# Patient Record
Sex: MALE | Race: WHITE
[De-identification: names, ages, dates, MRNs, and addresses within clinical notes are randomized per-mention and may not be internally consistent; named-entity substitution may affect disease eponyms.]

---

## 2017-02-07 ENCOUNTER — PRIOR ORIGINAL RECORDS (OUTPATIENT)
Dept: OTHER | Age: 77
End: 2017-02-07

## 2017-02-07 ENCOUNTER — LAB ENCOUNTER (OUTPATIENT)
Dept: LAB | Facility: HOSPITAL | Age: 77
End: 2017-02-07
Attending: INTERNAL MEDICINE
Payer: MEDICARE

## 2017-02-07 DIAGNOSIS — E78.00 PURE HYPERCHOLESTEROLEMIA: Primary | ICD-10-CM

## 2017-02-07 LAB
ALT SERPL-CCNC: 41 U/L (ref 17–63)
AST SERPL-CCNC: 33 U/L (ref 15–41)
CHOLEST SERPL-MCNC: 133 MG/DL (ref 110–200)
HDLC SERPL-MCNC: 49 MG/DL
LDLC SERPL CALC-MCNC: 73 MG/DL (ref 0–99)
NONHDLC SERPL-MCNC: 84 MG/DL
TRIGL SERPL-MCNC: 57 MG/DL (ref 1–149)

## 2017-02-07 PROCEDURE — 84450 TRANSFERASE (AST) (SGOT): CPT

## 2017-02-07 PROCEDURE — 80061 LIPID PANEL: CPT

## 2017-02-07 PROCEDURE — 84460 ALANINE AMINO (ALT) (SGPT): CPT

## 2017-02-07 PROCEDURE — 36415 COLL VENOUS BLD VENIPUNCTURE: CPT

## 2017-02-13 ENCOUNTER — PRIOR ORIGINAL RECORDS (OUTPATIENT)
Dept: OTHER | Age: 77
End: 2017-02-13

## 2017-06-13 LAB
ALT (SGPT): 41 U/L
AST (SGOT): 33 U/L
CHOLESTEROL, TOTAL: 133 MG/DL
HDL CHOLESTEROL: 49 MG/DL
LDL CHOLESTEROL: 73 MG/DL
TRIGLYCERIDES: 57 MG/DL

## 2017-06-14 ENCOUNTER — PRIOR ORIGINAL RECORDS (OUTPATIENT)
Dept: OTHER | Age: 77
End: 2017-06-14

## 2017-07-27 ENCOUNTER — PRIOR ORIGINAL RECORDS (OUTPATIENT)
Dept: OTHER | Age: 77
End: 2017-07-27

## 2017-07-31 ENCOUNTER — PRIOR ORIGINAL RECORDS (OUTPATIENT)
Dept: OTHER | Age: 77
End: 2017-07-31

## 2017-10-26 ENCOUNTER — PRIOR ORIGINAL RECORDS (OUTPATIENT)
Dept: OTHER | Age: 77
End: 2017-10-26

## 2017-11-06 ENCOUNTER — PRIOR ORIGINAL RECORDS (OUTPATIENT)
Dept: OTHER | Age: 77
End: 2017-11-06

## 2018-05-23 ENCOUNTER — PRIOR ORIGINAL RECORDS (OUTPATIENT)
Dept: OTHER | Age: 78
End: 2018-05-23

## 2018-05-23 ENCOUNTER — MYAURORA ACCOUNT LINK (OUTPATIENT)
Dept: OTHER | Age: 78
End: 2018-05-23

## 2019-02-28 VITALS
RESPIRATION RATE: 18 BRPM | HEART RATE: 67 BPM | BODY MASS INDEX: 25.61 KG/M2 | WEIGHT: 150 LBS | DIASTOLIC BLOOD PRESSURE: 60 MMHG | HEIGHT: 64 IN | SYSTOLIC BLOOD PRESSURE: 100 MMHG

## 2019-03-01 VITALS
WEIGHT: 153 LBS | HEIGHT: 64 IN | SYSTOLIC BLOOD PRESSURE: 116 MMHG | BODY MASS INDEX: 26.12 KG/M2 | HEART RATE: 64 BPM | DIASTOLIC BLOOD PRESSURE: 68 MMHG | RESPIRATION RATE: 18 BRPM

## 2019-03-01 VITALS
WEIGHT: 148 LBS | SYSTOLIC BLOOD PRESSURE: 132 MMHG | HEIGHT: 64 IN | HEART RATE: 68 BPM | RESPIRATION RATE: 18 BRPM | DIASTOLIC BLOOD PRESSURE: 82 MMHG | BODY MASS INDEX: 25.27 KG/M2

## 2019-03-21 RX ORDER — METOPROLOL SUCCINATE 25 MG/1
TABLET, EXTENDED RELEASE ORAL
COMMUNITY

## 2019-03-21 RX ORDER — ATORVASTATIN CALCIUM 20 MG/1
TABLET, FILM COATED ORAL
COMMUNITY
End: 2019-05-06 | Stop reason: SDUPTHER

## 2019-03-21 RX ORDER — CELECOXIB 200 MG/1
CAPSULE ORAL
COMMUNITY

## 2019-03-21 RX ORDER — HYDROXYCHLOROQUINE SULFATE 200 MG/1
TABLET, FILM COATED ORAL
COMMUNITY

## 2019-04-23 PROBLEM — R00.2 PALPITATIONS: Status: ACTIVE | Noted: 2019-04-23

## 2019-04-23 PROBLEM — E78.00 PURE HYPERCHOLESTEROLEMIA: Status: ACTIVE | Noted: 2019-04-23

## 2019-04-23 PROBLEM — E78.5 DYSLIPIDEMIA: Status: ACTIVE | Noted: 2019-04-23

## 2019-04-23 PROBLEM — I25.10 CAD (CORONARY ARTERY DISEASE): Status: ACTIVE | Noted: 2019-04-23

## 2019-04-26 ENCOUNTER — LAB ENCOUNTER (OUTPATIENT)
Dept: LAB | Facility: HOSPITAL | Age: 79
End: 2019-04-26
Attending: INTERNAL MEDICINE
Payer: MEDICARE

## 2019-04-26 ENCOUNTER — OFFICE VISIT (OUTPATIENT)
Dept: CARDIOLOGY | Age: 79
End: 2019-04-26

## 2019-04-26 ENCOUNTER — CLINICAL ABSTRACT (OUTPATIENT)
Dept: CARDIOLOGY | Age: 79
End: 2019-04-26

## 2019-04-26 VITALS
SYSTOLIC BLOOD PRESSURE: 114 MMHG | OXYGEN SATURATION: 96 % | HEART RATE: 80 BPM | HEIGHT: 61 IN | DIASTOLIC BLOOD PRESSURE: 62 MMHG | WEIGHT: 157 LBS | BODY MASS INDEX: 29.64 KG/M2

## 2019-04-26 DIAGNOSIS — I25.10 CORONARY ARTERY DISEASE INVOLVING NATIVE CORONARY ARTERY OF NATIVE HEART WITHOUT ANGINA PECTORIS: Primary | ICD-10-CM

## 2019-04-26 DIAGNOSIS — I10 BENIGN HYPERTENSION: ICD-10-CM

## 2019-04-26 DIAGNOSIS — E78.00 PURE HYPERCHOLESTEROLEMIA: Primary | ICD-10-CM

## 2019-04-26 DIAGNOSIS — E78.00 PURE HYPERCHOLESTEROLEMIA: ICD-10-CM

## 2019-04-26 LAB
ALBUMIN SERPL-MCNC: NORMAL G/DL
ALBUMIN/GLOB SERPL: NORMAL {RATIO}
ALP SERPL-CCNC: NORMAL U/L
ALT SERPL-CCNC: 28 U/L
ALT SERPL-CCNC: NORMAL U/L
ANION GAP SERPL CALC-SCNC: NORMAL MMOL/L
AST SERPL-CCNC: 28 U/L
AST SERPL-CCNC: NORMAL U/L
BILIRUB SERPL-MCNC: NORMAL MG/DL
BUN SERPL-MCNC: 15 MG/DL
BUN/CREAT SERPL: NORMAL
CALCIUM SERPL-MCNC: 9.2 MG/DL
CHLORIDE SERPL-SCNC: 106 MMOL/L
CHOLEST SERPL-MCNC: 122 MG/DL
CHOLEST/HDLC SERPL: NORMAL {RATIO}
CO2 SERPL-SCNC: NORMAL MMOL/L
CREAT SERPL-MCNC: 0.77 MG/DL
GLOBULIN SER-MCNC: NORMAL G/DL
GLUCOSE SERPL-MCNC: 84 MG/DL
HDLC SERPL-MCNC: 47 MG/DL
LDLC SERPL CALC-MCNC: 61 MG/DL
LENGTH OF FAST TIME PATIENT: NORMAL H
LENGTH OF FAST TIME PATIENT: NORMAL H
NONHDLC SERPL-MCNC: 75 MG/DL
POTASSIUM SERPL-SCNC: 3.9 MMOL/L
PROT SERPL-MCNC: NORMAL G/DL
SODIUM SERPL-SCNC: 140 MMOL/L
TRIGL SERPL-MCNC: 68 MG/DL
VLDLC SERPL CALC-MCNC: NORMAL MG/DL

## 2019-04-26 PROCEDURE — 84460 ALANINE AMINO (ALT) (SGPT): CPT

## 2019-04-26 PROCEDURE — 80048 BASIC METABOLIC PNL TOTAL CA: CPT

## 2019-04-26 PROCEDURE — 84450 TRANSFERASE (AST) (SGOT): CPT

## 2019-04-26 PROCEDURE — 36415 COLL VENOUS BLD VENIPUNCTURE: CPT

## 2019-04-26 PROCEDURE — 99214 OFFICE O/P EST MOD 30 MIN: CPT | Performed by: INTERNAL MEDICINE

## 2019-04-26 PROCEDURE — 80061 LIPID PANEL: CPT

## 2019-04-26 RX ORDER — FOLIC ACID 1 MG/1
1 TABLET ORAL DAILY
COMMUNITY

## 2019-05-07 RX ORDER — ATORVASTATIN CALCIUM 20 MG/1
TABLET, FILM COATED ORAL
Qty: 30 TABLET | Refills: 0 | Status: SHIPPED | OUTPATIENT
Start: 2019-05-07 | End: 2019-06-08 | Stop reason: SDUPTHER

## 2019-06-10 RX ORDER — ATORVASTATIN CALCIUM 20 MG/1
TABLET, FILM COATED ORAL
Qty: 30 TABLET | Refills: 6 | Status: SHIPPED | OUTPATIENT
Start: 2019-06-10 | End: 2020-01-21 | Stop reason: SDUPTHER

## 2020-01-21 RX ORDER — ATORVASTATIN CALCIUM 20 MG/1
TABLET, FILM COATED ORAL
Qty: 30 TABLET | Refills: 5 | Status: SHIPPED | OUTPATIENT
Start: 2020-01-21 | End: 2020-07-29

## 2020-04-29 ENCOUNTER — OFFICE VISIT (OUTPATIENT)
Dept: CARDIOLOGY | Age: 80
End: 2020-04-29

## 2020-04-29 VITALS — WEIGHT: 160 LBS | HEIGHT: 61 IN | BODY MASS INDEX: 30.21 KG/M2

## 2020-04-29 DIAGNOSIS — E78.00 PURE HYPERCHOLESTEROLEMIA: ICD-10-CM

## 2020-04-29 DIAGNOSIS — I25.10 CORONARY ARTERY DISEASE INVOLVING NATIVE CORONARY ARTERY OF NATIVE HEART WITHOUT ANGINA PECTORIS: Primary | ICD-10-CM

## 2020-04-29 PROCEDURE — 99441 TELEPHONE E&M BY PHYSICIAN EST PT NOT ORIG PREV 7 DAYS 5-10 MIN: CPT | Performed by: INTERNAL MEDICINE

## 2020-04-29 ASSESSMENT — PATIENT HEALTH QUESTIONNAIRE - PHQ9
SUM OF ALL RESPONSES TO PHQ9 QUESTIONS 1 AND 2: 0
SUM OF ALL RESPONSES TO PHQ9 QUESTIONS 1 AND 2: 0
2. FEELING DOWN, DEPRESSED OR HOPELESS: NOT AT ALL
1. LITTLE INTEREST OR PLEASURE IN DOING THINGS: NOT AT ALL

## 2020-07-29 ENCOUNTER — TELEPHONE (OUTPATIENT)
Dept: CARDIOLOGY | Age: 80
End: 2020-07-29

## 2020-07-29 DIAGNOSIS — E78.00 PURE HYPERCHOLESTEROLEMIA: Primary | ICD-10-CM

## 2020-07-29 RX ORDER — ATORVASTATIN CALCIUM 20 MG/1
TABLET, FILM COATED ORAL
Qty: 30 TABLET | Refills: 3 | Status: SHIPPED | OUTPATIENT
Start: 2020-07-29 | End: 2020-11-10

## 2020-10-06 LAB
CHOLEST SERPL-MCNC: 139 MG/DL
HDLC SERPL-MCNC: 45 MG/DL
LDLC SERPL CALC-MCNC: 79 MG/DL
NONHDLC SERPL-MCNC: 94 MG/DL
TRIGL SERPL-MCNC: 71 MG/DL

## 2020-10-07 ENCOUNTER — CLINICAL ABSTRACT (OUTPATIENT)
Dept: CARDIOLOGY | Age: 80
End: 2020-10-07

## 2020-10-08 ENCOUNTER — TELEPHONE (OUTPATIENT)
Dept: CARDIOLOGY | Age: 80
End: 2020-10-08

## 2020-11-02 ENCOUNTER — APPOINTMENT (OUTPATIENT)
Dept: CARDIOLOGY | Age: 80
End: 2020-11-02

## 2020-11-05 ENCOUNTER — APPOINTMENT (OUTPATIENT)
Dept: GENERAL RADIOLOGY | Age: 80
DRG: 177 | End: 2020-11-05
Attending: EMERGENCY MEDICINE
Payer: MEDICARE

## 2020-11-05 ENCOUNTER — HOSPITAL ENCOUNTER (INPATIENT)
Facility: HOSPITAL | Age: 80
LOS: 3 days | Discharge: HOME | DRG: 177 | End: 2020-11-08
Attending: EMERGENCY MEDICINE | Admitting: HOSPITALIST
Payer: MEDICARE

## 2020-11-05 DIAGNOSIS — R09.02 HYPOXIA: ICD-10-CM

## 2020-11-05 DIAGNOSIS — U07.1 COVID-19: Primary | ICD-10-CM

## 2020-11-05 PROCEDURE — 71045 X-RAY EXAM CHEST 1 VIEW: CPT | Performed by: EMERGENCY MEDICINE

## 2020-11-05 PROCEDURE — XW033E5 INTRODUCTION OF REMDESIVIR ANTI-INFECTIVE INTO PERIPHERAL VEIN, PERCUTANEOUS APPROACH, NEW TECHNOLOGY GROUP 5: ICD-10-PCS | Performed by: INTERNAL MEDICINE

## 2020-11-05 PROCEDURE — 3E0333Z INTRODUCTION OF ANTI-INFLAMMATORY INTO PERIPHERAL VEIN, PERCUTANEOUS APPROACH: ICD-10-PCS | Performed by: EMERGENCY MEDICINE

## 2020-11-05 PROCEDURE — 99222 1ST HOSP IP/OBS MODERATE 55: CPT | Performed by: HOSPITALIST

## 2020-11-05 RX ORDER — CELECOXIB 200 MG/1
200 CAPSULE ORAL 2 TIMES DAILY
Status: ON HOLD | COMMUNITY
End: 2021-02-15

## 2020-11-05 RX ORDER — PANTOPRAZOLE SODIUM 20 MG/1
20 TABLET, DELAYED RELEASE ORAL DAILY
Status: DISCONTINUED | OUTPATIENT
Start: 2020-11-06 | End: 2020-11-08

## 2020-11-05 RX ORDER — ASPIRIN 81 MG/1
81 TABLET ORAL DAILY
Status: DISCONTINUED | OUTPATIENT
Start: 2020-11-06 | End: 2020-11-08

## 2020-11-05 RX ORDER — ENOXAPARIN SODIUM 100 MG/ML
40 INJECTION SUBCUTANEOUS DAILY
Status: DISCONTINUED | OUTPATIENT
Start: 2020-11-05 | End: 2020-11-08

## 2020-11-05 RX ORDER — ESOMEPRAZOLE MAGNESIUM 40 MG/1
40 CAPSULE, DELAYED RELEASE ORAL
COMMUNITY

## 2020-11-05 RX ORDER — ATORVASTATIN CALCIUM 20 MG/1
20 TABLET, FILM COATED ORAL NIGHTLY
COMMUNITY
End: 2021-08-11

## 2020-11-05 RX ORDER — ATORVASTATIN CALCIUM 20 MG/1
20 TABLET, FILM COATED ORAL NIGHTLY
Status: DISCONTINUED | OUTPATIENT
Start: 2020-11-05 | End: 2020-11-08

## 2020-11-05 RX ORDER — ASPIRIN 81 MG/1
81 TABLET ORAL DAILY
COMMUNITY

## 2020-11-05 RX ORDER — ACETAMINOPHEN 500 MG
1000 TABLET ORAL ONCE
Status: COMPLETED | OUTPATIENT
Start: 2020-11-05 | End: 2020-11-05

## 2020-11-05 RX ORDER — POTASSIUM CHLORIDE 20 MEQ/1
40 TABLET, EXTENDED RELEASE ORAL EVERY 4 HOURS
Status: COMPLETED | OUTPATIENT
Start: 2020-11-05 | End: 2020-11-06

## 2020-11-05 RX ORDER — DEXAMETHASONE SODIUM PHOSPHATE 4 MG/ML
10 VIAL (ML) INJECTION ONCE
Status: COMPLETED | OUTPATIENT
Start: 2020-11-05 | End: 2020-11-05

## 2020-11-05 RX ORDER — METOPROLOL SUCCINATE 25 MG/1
25 TABLET, EXTENDED RELEASE ORAL DAILY
Status: DISCONTINUED | OUTPATIENT
Start: 2020-11-05 | End: 2020-11-06

## 2020-11-05 NOTE — ED PROVIDER NOTES
Patient Seen in: [facility] Emergency Department In [place]      History   Patient presents with:  Difficulty Breathing    Stated Complaint: pos for covid feeling worse    HPI    Patient is a 25-year-old male comes emergency room for cough, shortness of b well appearing and non-toxic, Alert and oriented X 3   HEENT: Normocephalic, atraumatic. Moist mucous membranes. Pupils equal round reactive to light and accommodation, extraocular motion is intact, sclerae white, conjunctiva is pink.   Oropharynx is unre transcribed by Technologist)  For past week mid chest pain, on and off. Productive cough and short of breath for past week. FINDINGS:  There has been interval development of multiple patchy ground-glass opacities within the lungs bilaterally.   Bilateral Date Reviewed: 4/21/2014          ICD-10-CM Noted POA    COVID-19 U07.1 11/5/2020 Unknown

## 2020-11-06 PROBLEM — I25.10 CORONARY ARTERY DISEASE INVOLVING NATIVE CORONARY ARTERY OF NATIVE HEART WITHOUT ANGINA PECTORIS: Status: ACTIVE | Noted: 2020-11-06

## 2020-11-06 PROBLEM — K21.00 GASTROESOPHAGEAL REFLUX DISEASE WITH ESOPHAGITIS WITHOUT HEMORRHAGE: Chronic | Status: ACTIVE | Noted: 2020-11-06

## 2020-11-06 PROCEDURE — 99232 SBSQ HOSP IP/OBS MODERATE 35: CPT | Performed by: HOSPITALIST

## 2020-11-06 RX ORDER — FUROSEMIDE 10 MG/ML
20 INJECTION INTRAMUSCULAR; INTRAVENOUS ONCE
Status: COMPLETED | OUTPATIENT
Start: 2020-11-06 | End: 2020-11-06

## 2020-11-06 RX ORDER — SODIUM CHLORIDE 9 MG/ML
INJECTION, SOLUTION INTRAVENOUS ONCE
Status: DISCONTINUED | OUTPATIENT
Start: 2020-11-06 | End: 2020-11-08

## 2020-11-06 NOTE — PROGRESS NOTES
[facility]ITAL[facility]  Progress Note     Dee Dee Dennis Patient Status:  Inpatient    3/26/1940 MRN [id number]   [facility] 3NW-A Attending Nelia [id number] [address] Day # 1 PCP Raisa Banks     Chief Complaint: SOB    S: Patie data reviewed in Epic.     Medications:   • metoprolol Tartrate  25 mg Oral Daily Beta Blocker   • remdesivir IVPB  100 mg Intravenous Q24H   • aspirin  81 mg Oral Daily   • atorvastatin  20 mg Oral Nightly   • Pantoprazole Sodium  20 mg Oral Daily   • enox

## 2020-11-06 NOTE — ED NOTES
Report given to [facility] ambulance medics. Pt will be transferred by ambulance at this time.

## 2020-11-06 NOTE — H&P
[facility]  History and Physical     Ray Masters Patient Status:  Inpatient    3/26/1940 MRN [id number]   [facility] 3NW-A Attending Cooper Rogers MD   Hosp Day # 0 PCP Devyn Sams     Chief Complaint: Weakness of br Rfl:     •  Hydroxychloroquine Sulfate (PLAQUENIL) 200 MG Oral Tab, Take 200 mg by mouth daily. , Disp: , Rfl:     •  Metoprolol Succinate ER (TOPROL XL) 25 MG Oral Tablet 24 Hr, Take 25 mg by mouth daily. , Disp: , Rfl:     •  Hydroxychloroquine Sulfate 200 95   GFRNAA 82   CA 8.3*   ALB 2.8*   *   K 3.6      CO2 27.0   ALKPHO 99   AST 42*   ALT 32   BILT 0.4   TP 7.9       Estimated Creatinine Clearance: 51.5 mL/min (based on SCr of 0.86 mg/dL).     No results for input(s): PTP, INR in the last 16

## 2020-11-06 NOTE — CM/SW NOTE
11/06/20 0900   CM/SW Screening   Referral Source Social Work (self-referral)   Information Source Nursing rounds       MSW and CM reviewed case. DC needs unknown at this time. From home.   No PT REC

## 2020-11-06 NOTE — CONSULTS
INFECTIOUS DISEASE CONSULT NOTE    Eil Rodriguez Patient Status:  Inpatient    3/26/1940 MRN [id number]   [facility] 3NW-A Attending Mckenzie Stein1 [address] Day # 1 the the HPI. Constitutional: as above  Eyes: Negative for eye drainage and redness.   Ears, nose, mouth, throat: Negative for nasal congestion, sore throat, oral ulcers  Respiratory: as above  Cardiovascular: Negative for chest pain, syncope, lower extre PROTEIN, QUANT (mg/L)   Date Value   04/01/2009 39.1 (H)     C-Reactive Protein (mg/dL)   Date Value   11/06/2020 3.43 (H)      No results found for: MAGALIS  Recent Labs   Lab 11/05/20 2055   COLORUR Yellow   CLARITY Clear   SPECGRAVITY 1.011   Sebastian Bolton type lung opacities concerning for increasing COVID-19 pneumonia of both lungs. 2. Interval development of moderate pleural parenchymal disease right lung base and mild pleural effusion left lung base.  3. Interval development of mild pulmonary vascular con

## 2020-11-06 NOTE — PLAN OF CARE
Pt a&o x 4. Cheerful & cooperative w/ care. Slightly [place]. Using bed / chair alarm  Maintaining sats on o2 x 2l nc. Lungs clear & diminished @ bases. No dyspnea noted   Tele = nsr.  K = 4.1.  scd's & lovenox cont. Appetite fair.   Voiding clear yellow ABGs  - Provide Smoking Cessation handout, if applicable  - Encourage broncho-pulmonary hygiene including cough, deep breathe, Incentive Spirometry  - Assess the need for suctioning and perform as needed  - Assess and instruct to report SOB or any respirat

## 2020-11-06 NOTE — PROGRESS NOTES
Received call from pt's daughter re: order to transfuse platelets. dtr states she had spoke with pt & they want to research platelet transfusion prior to administering.

## 2020-11-06 NOTE — PLAN OF CARE
NURSING ADMISSION NOTE      Patient admitted via Cart  Oriented to room. Safety precautions initiated. Bed in low position. Call light in reach. Patient is A/Ox4. Slightly [place]. 3L, .  Productive to non productive cough, pt aware sputum sample

## 2020-11-07 PROCEDURE — 99232 SBSQ HOSP IP/OBS MODERATE 35: CPT | Performed by: HOSPITALIST

## 2020-11-07 PROCEDURE — XW13325 TRANSFUSION OF CONVALESCENT PLASMA (NONAUTOLOGOUS) INTO PERIPHERAL VEIN, PERCUTANEOUS APPROACH, NEW TECHNOLOGY GROUP 5: ICD-10-PCS | Performed by: INTERNAL MEDICINE

## 2020-11-07 NOTE — PLAN OF CARE
1 unit of  FFP transfused without signs/symptoms of adverse reaction. Patient's lungs clear throughout and VSS. IV site remains clean, dry, and intact with no redness or swelling noted, patient denies pain at IV site. Will continue to monitor patient.

## 2020-11-07 NOTE — PLAN OF CARE
Pt sitting up in bed upon assessment, states he is \"feeling better tonight\" overall, denies shortness of breath at rest, states SOB with exertion is improving. Currently on 2L o2.  Pt with noted moist cough, able to cough up some mucus, tan in color, sput normal limits  Description: INTERVENTIONS:  - Monitor labs and rhythm and assess patient for signs and symptoms of electrolyte imbalances  - Administer electrolyte replacement as ordered  - Monitor response to electrolyte replacements, including rhythm and

## 2020-11-07 NOTE — PROGRESS NOTES
[facility]ITAL[facility]  Progress Note     Diania Level Patient Status:  Inpatient    3/26/1940 MRN [id number]   [facility] 3NW-A Attending Eddie Samaniego [facility] Day # 2 PCP Tamela Wang     Chief Complaint: SOB    S: Patie Lab 11/05/20  1717 11/06/20  0010   TROP <0.045  <0.045 <0.045   CK 62  --             Imaging: Imaging data reviewed in Epic.     Medications:   • metoprolol Tartrate  25 mg Oral Daily Beta Blocker   • sodium chloride   Intravenous Once   • remdesivir IV

## 2020-11-07 NOTE — PLAN OF CARE
Problem: CARDIOVASCULAR - ADULT  Goal: Absence of cardiac arrhythmias or at baseline  Description: INTERVENTIONS:  - Continuous cardiac monitoring, monitor vital signs, obtain 12 lead EKG if indicated  - Evaluate effectiveness of antiarrhythmic and heart Writer called mom to follow up on call made to after hours triage over the weekend. Mom states they did not start the amoxicillin but overall symptoms have improved. Mom has no questions or concerns at this time, mom will call if symptoms worsen or with other concerns.

## 2020-11-07 NOTE — PLAN OF CARE
Pt resting in bed. Lungs clear/diminied,pt states he has a dry cough. Weaned off oxygen this morning. denies any sob,states feeling better today. Using IS. Ns on tele. Pt states that after discussing with his family yesterday,he is willing to get plasma.  P

## 2020-11-08 VITALS
RESPIRATION RATE: 18 BRPM | OXYGEN SATURATION: 95 % | TEMPERATURE: 97 F | DIASTOLIC BLOOD PRESSURE: 66 MMHG | HEART RATE: 67 BPM | HEIGHT: 61 IN | WEIGHT: 150 LBS | BODY MASS INDEX: 28.32 KG/M2 | SYSTOLIC BLOOD PRESSURE: 136 MMHG

## 2020-11-08 PROCEDURE — 99239 HOSP IP/OBS DSCHRG MGMT >30: CPT | Performed by: HOSPITALIST

## 2020-11-08 RX ORDER — DEXAMETHASONE 6 MG/1
6 TABLET ORAL DAILY
Qty: 5 TABLET | Refills: 0 | Status: SHIPPED | OUTPATIENT
Start: 2020-11-08 | End: 2020-11-13

## 2020-11-08 NOTE — PLAN OF CARE
Assumed care at 1500. Pt alert and oriented. Plan is to d/c this evening. Spoke with pt's daughter, went over paperwork. Will  this evening. Pt updated on POC. Will continue to monitor.

## 2020-11-08 NOTE — PLAN OF CARE
Patient is alert and oriented, denies pain. Up to chair with stand by assist and cane. 3 doses of Remdesivir given. Per Dr. Howard Wesely, patient may discharge home today. Vitals stable and call light in reach.        Problem: CARDIOVASCULAR - ADULT  Goal: Abse ADULT  Goal: Return mobility to safest level of function  Description: INTERVENTIONS:  - Assess patient stability and activity tolerance for standing, transferring and ambulating w/ or w/o assistive devices  - Assist with transfers and ambulation using saf

## 2020-11-08 NOTE — PLAN OF CARE
Patient is alert and oriented. Continues to maintain appropriate O2 levels while remaining on room air. No cough noted at this time but patient states his cough has improved from yesterday.   BP slightly elevated during night shift, PO cardiac meds given rhythm and repeat lab results as appropriate  - Fluid restriction as ordered  - Instruct patient on fluid and nutrition restrictions as appropriate  Outcome: Progressing     Problem: MUSCULOSKELETAL - ADULT  Goal: Return mobility to safest level of functio

## 2020-11-08 NOTE — PLAN OF CARE
NURSING DISCHARGE NOTE    Discharged Home via Wheelchair. Accompanied by RN  Belongings Taken by patient/family. IV removed. All questions answered.

## 2020-11-08 NOTE — CM/SW NOTE
2:44pm  MSW reviewed chart. No PT Rec at this time. MSW called pt's dtr Viktoriya Rowley [phone] and left message to complete assessment. MSW called pt's room and spoke to pt. He states he has no home 02 or HHC in place.

## 2020-11-08 NOTE — PROGRESS NOTES
[facility]  Progress Note     Zahraa Mendiola Patient Status:  Inpatient    3/26/1940 MRN [id number]   [facility] 3NW-A Attending [facility] Day # 3 PCP Rowan Boast     Chief Complaint: SOB    S: Patie Creatinine Clearance: 53.4 mL/min (based on SCr of 0.83 mg/dL). No results for input(s): PTP, INR in the last 168 hours.     Recent Labs   Lab 11/05/20  1717 11/06/20  0010   TROP <0.045  <0.045 <0.045   CK 62  --             Imaging: Imaging data review

## 2020-11-08 NOTE — CM/SW NOTE
11/08/20 1400   CM/SW Referral Data   Referral Source Social Work (self-referral)   Reason for Referral Discharge planning   Informant Children;Patient   Patient Info   Patient's Mental Status Oriented; Alert   Patient's Home Environment House   Patient

## 2020-11-10 RX ORDER — ATORVASTATIN CALCIUM 20 MG/1
TABLET, FILM COATED ORAL
Qty: 30 TABLET | Refills: 0 | Status: SHIPPED | OUTPATIENT
Start: 2020-11-10 | End: 2020-12-29

## 2020-11-18 ENCOUNTER — HOSPITAL ENCOUNTER (EMERGENCY)
Age: 80
Discharge: HOME | End: 2020-11-18
Attending: EMERGENCY MEDICINE
Payer: MEDICARE

## 2020-11-18 VITALS
SYSTOLIC BLOOD PRESSURE: 125 MMHG | TEMPERATURE: 98 F | WEIGHT: 150 LBS | RESPIRATION RATE: 22 BRPM | HEART RATE: 92 BPM | BODY MASS INDEX: 28 KG/M2 | OXYGEN SATURATION: 97 % | DIASTOLIC BLOOD PRESSURE: 75 MMHG

## 2020-11-18 DIAGNOSIS — R06.6 HICCUPS: Primary | ICD-10-CM

## 2020-11-18 PROCEDURE — 84484 ASSAY OF TROPONIN QUANT: CPT | Performed by: EMERGENCY MEDICINE

## 2020-11-18 PROCEDURE — 93010 ELECTROCARDIOGRAM REPORT: CPT

## 2020-11-18 PROCEDURE — 83690 ASSAY OF LIPASE: CPT | Performed by: EMERGENCY MEDICINE

## 2020-11-18 PROCEDURE — 85025 COMPLETE CBC W/AUTO DIFF WBC: CPT | Performed by: EMERGENCY MEDICINE

## 2020-11-18 PROCEDURE — 99284 EMERGENCY DEPT VISIT MOD MDM: CPT

## 2020-11-18 PROCEDURE — 80053 COMPREHEN METABOLIC PANEL: CPT | Performed by: EMERGENCY MEDICINE

## 2020-11-18 PROCEDURE — 93005 ELECTROCARDIOGRAM TRACING: CPT

## 2020-11-18 PROCEDURE — 96374 THER/PROPH/DIAG INJ IV PUSH: CPT

## 2020-11-18 PROCEDURE — 99285 EMERGENCY DEPT VISIT HI MDM: CPT

## 2020-11-18 RX ORDER — METOCLOPRAMIDE HYDROCHLORIDE 5 MG/ML
5 INJECTION INTRAMUSCULAR; INTRAVENOUS ONCE
Status: COMPLETED | OUTPATIENT
Start: 2020-11-18 | End: 2020-11-18

## 2020-11-18 RX ORDER — GABAPENTIN 100 MG/1
100 CAPSULE ORAL 3 TIMES DAILY
Qty: 21 CAPSULE | Refills: 0 | Status: SHIPPED | OUTPATIENT
Start: 2020-11-18 | End: 2020-11-25

## 2020-11-18 RX ORDER — LIDOCAINE HYDROCHLORIDE 20 MG/ML
10 SOLUTION OROPHARYNGEAL ONCE
Status: COMPLETED | OUTPATIENT
Start: 2020-11-18 | End: 2020-11-18

## 2020-11-18 RX ORDER — MAGNESIUM HYDROXIDE/ALUMINUM HYDROXICE/SIMETHICONE 120; 1200; 1200 MG/30ML; MG/30ML; MG/30ML
30 SUSPENSION ORAL ONCE
Status: COMPLETED | OUTPATIENT
Start: 2020-11-18 | End: 2020-11-18

## 2020-11-18 NOTE — ED PROVIDER NOTES
Patient Seen in: [facility] Emergency Department In [place]      History   Patient presents with: Other    Stated Complaint: hiccups x 4 days. was admitted for covid a week ago. felt better.     HPI    27-year-old with past medical history of hypertension, Appearance: Normal appearance. HENT:      Head: Normocephalic and atraumatic. Nose: Nose normal.      Mouth/Throat:      Mouth: Mucous membranes are moist.   Eyes:      Extraocular Movements: Extraocular movements intact.       Pupils: Pupils are equ ------                     CBC W/ DIFFERENTIAL[[id number]]          Abnormal            Final result                 Please view results for these tests on the individual orders. EKG    Rate, intervals and axes as noted on EKG Report.   Rate: 90  Rhy

## 2020-11-20 NOTE — DISCHARGE SUMMARY
[facility]ITAL[facility]  DISCHARGE SUMMARY     Aura Renner Patient Status:  Inpatient    3/26/1940 MRN [id number]   [facility] 3NW-A Attending No att. providers found   Hosp Day # 3 PCP Onetha Sober     Date of Admission: 2020 Discharge:   · None    Consultants:  • Infectious disease-Dr. Yanick Gar    Discharge Medication List:     Discharge Medications      CHANGE how you take these medications      Instructions Prescription details   Hydroxychloroquine Sulfate 200 MG Tabs  Commonly None    Follow-up appointment:   No follow-up provider specified. Appointments for Next 30 Days 11/19/2020 - 12/19/2020    None          Vital signs:       Physical Exam:    General: No acute distress. Respiratory: Clear to auscultation bilaterally.  No

## 2020-12-15 ENCOUNTER — APPOINTMENT (OUTPATIENT)
Dept: GENERAL RADIOLOGY | Facility: HOSPITAL | Age: 80
DRG: 179 | End: 2020-12-15
Attending: HOSPITALIST
Payer: MEDICARE

## 2020-12-15 ENCOUNTER — APPOINTMENT (OUTPATIENT)
Dept: GENERAL RADIOLOGY | Age: 80
DRG: 179 | End: 2020-12-15
Attending: EMERGENCY MEDICINE
Payer: MEDICARE

## 2020-12-15 ENCOUNTER — HOSPITAL ENCOUNTER (INPATIENT)
Facility: HOSPITAL | Age: 80
LOS: 4 days | Discharge: HOME HEALTH | DRG: 179 | End: 2020-12-19
Attending: EMERGENCY MEDICINE | Admitting: HOSPITALIST
Payer: MEDICARE

## 2020-12-15 ENCOUNTER — HOSPITAL ENCOUNTER (EMERGENCY)
Facility: HOSPITAL | Age: 80
Discharge: STILL ADMITTED OR ADMINISTRATIVE | End: 2020-12-15
Payer: MEDICARE

## 2020-12-15 DIAGNOSIS — I10 ESSENTIAL HYPERTENSION: ICD-10-CM

## 2020-12-15 DIAGNOSIS — Z86.16 HISTORY OF 2019 NOVEL CORONAVIRUS DISEASE (COVID-19): ICD-10-CM

## 2020-12-15 DIAGNOSIS — J18.9 PNEUMONIA OF BOTH LOWER LOBES DUE TO INFECTIOUS ORGANISM: Primary | ICD-10-CM

## 2020-12-15 DIAGNOSIS — I25.10 CORONARY ARTERY DISEASE INVOLVING NATIVE CORONARY ARTERY OF NATIVE HEART WITHOUT ANGINA PECTORIS: ICD-10-CM

## 2020-12-15 PROCEDURE — 99223 1ST HOSP IP/OBS HIGH 75: CPT | Performed by: HOSPITALIST

## 2020-12-15 PROCEDURE — 73560 X-RAY EXAM OF KNEE 1 OR 2: CPT | Performed by: HOSPITALIST

## 2020-12-15 PROCEDURE — 71045 X-RAY EXAM CHEST 1 VIEW: CPT | Performed by: EMERGENCY MEDICINE

## 2020-12-15 RX ORDER — ENOXAPARIN SODIUM 100 MG/ML
40 INJECTION SUBCUTANEOUS NIGHTLY
Status: DISCONTINUED | OUTPATIENT
Start: 2020-12-15 | End: 2020-12-19

## 2020-12-15 RX ORDER — ATORVASTATIN CALCIUM 20 MG/1
20 TABLET, FILM COATED ORAL NIGHTLY
Status: DISCONTINUED | OUTPATIENT
Start: 2020-12-15 | End: 2020-12-19

## 2020-12-15 RX ORDER — PANTOPRAZOLE SODIUM 40 MG/1
40 TABLET, DELAYED RELEASE ORAL
Status: DISCONTINUED | OUTPATIENT
Start: 2020-12-16 | End: 2020-12-15

## 2020-12-15 RX ORDER — PANTOPRAZOLE SODIUM 40 MG/1
40 TABLET, DELAYED RELEASE ORAL
Status: DISCONTINUED | OUTPATIENT
Start: 2020-12-16 | End: 2020-12-19

## 2020-12-15 RX ORDER — SODIUM CHLORIDE 9 MG/ML
INJECTION, SOLUTION INTRAVENOUS CONTINUOUS
Status: ACTIVE | OUTPATIENT
Start: 2020-12-15 | End: 2020-12-15

## 2020-12-15 RX ORDER — ACETAMINOPHEN 325 MG/1
650 TABLET ORAL EVERY 6 HOURS PRN
Status: DISCONTINUED | OUTPATIENT
Start: 2020-12-15 | End: 2020-12-19

## 2020-12-15 RX ORDER — ONDANSETRON 2 MG/ML
4 INJECTION INTRAMUSCULAR; INTRAVENOUS ONCE
Status: COMPLETED | OUTPATIENT
Start: 2020-12-15 | End: 2020-12-15

## 2020-12-15 RX ORDER — ONDANSETRON 2 MG/ML
4 INJECTION INTRAMUSCULAR; INTRAVENOUS EVERY 6 HOURS PRN
Status: DISCONTINUED | OUTPATIENT
Start: 2020-12-15 | End: 2020-12-19

## 2020-12-15 RX ORDER — ASPIRIN 81 MG/1
81 TABLET ORAL DAILY
Status: DISCONTINUED | OUTPATIENT
Start: 2020-12-16 | End: 2020-12-19

## 2020-12-15 RX ORDER — SODIUM CHLORIDE 9 MG/ML
INJECTION, SOLUTION INTRAVENOUS ONCE
Status: COMPLETED | OUTPATIENT
Start: 2020-12-15 | End: 2020-12-16

## 2020-12-15 RX ORDER — SODIUM CHLORIDE 9 MG/ML
INJECTION, SOLUTION INTRAVENOUS ONCE
Status: COMPLETED | OUTPATIENT
Start: 2020-12-15 | End: 2020-12-15

## 2020-12-15 NOTE — ED INITIAL ASSESSMENT (HPI)
Pt was just dx w bilateral pneumonia at immed care and told to come here to be seen. No vomiting just coughing.

## 2020-12-15 NOTE — H&P
[facility]  History and Physical     Alonzo Levy Patient Status:  Emergency    3/26/1940 MRN [id number]   Location [address] [place] [address] Attending Jermain Auguste MD   Hosp Day # 0 PCP Tracie Georges     Chief Take 2,000 Units by mouth daily. , Disp: , Rfl:     •  Hydroxychloroquine Sulfate (PLAQUENIL) 200 MG Oral Tab, Take 200 mg by mouth daily. , Disp: , Rfl:     •  Cholecalciferol (VITAMIN D) 2000 UNITS Oral Cap, Take 2,000 Units by mouth daily. , Disp: , Rfl: culture  4. SLP eval as patient reports intermittent trouble swallowing  2. Recent COVID19 infection  1. Tested positive on 10/31  2. Admitted here 11/5-11/8 and treated with Remdesivir/decadron  3. CAD  1. ASA/statin  2.  Hold BB for now as BP borderline l

## 2020-12-15 NOTE — ED PROVIDER NOTES
Patient Seen in: [facility] In [place]      History   Patient presents with:  Difficulty Breathing    Stated Complaint: Viral pneumonia, +covid in Oct. sent from [facility].     HPI    67-year-old male presents to the emergency depar nurse's notes. Monitor reveals a sinus rhythm with occasional PVCs. Pulse oximetry is 92 to 93% on room air. Respirations are 20 and unlabored. HEENT: Normocephalic atraumatic. Anicteric sclera.   Oral mucosa is moist.  Oropharynx is normal.  Neck: No Rhythm  Reading: Occasional PVCs. Left axis deviation. Inferior infarct, age undetermined. Possible anterior or lateral infarct, age undetermined. Abnormal EKG. Agree with EKG report.               Xr Chest Ap Portable  (cpt=71045)    Result Date: 12/1 at 4:13 PM       I personally reviewed the images myself and went over results with patient. Covid test was not repeated as the patient was diagnosed with COVID-19 6 weeks ago. Intravenous access was obtained.   Based on a lactic acid level of 2.6, a

## 2020-12-16 PROCEDURE — 99232 SBSQ HOSP IP/OBS MODERATE 35: CPT | Performed by: HOSPITALIST

## 2020-12-16 RX ORDER — BENZONATATE 100 MG/1
200 CAPSULE ORAL 3 TIMES DAILY PRN
Status: DISCONTINUED | OUTPATIENT
Start: 2020-12-16 | End: 2020-12-19

## 2020-12-16 RX ORDER — CEFUROXIME AXETIL 500 MG/1
500 TABLET ORAL 2 TIMES DAILY
Qty: 14 TABLET | Refills: 0 | Status: SHIPPED | OUTPATIENT
Start: 2020-12-16 | End: 2020-12-23

## 2020-12-16 RX ORDER — HYDROXYCHLOROQUINE SULFATE 200 MG/1
200 TABLET, FILM COATED ORAL DAILY
Status: DISCONTINUED | OUTPATIENT
Start: 2020-12-16 | End: 2020-12-19

## 2020-12-16 NOTE — PROGRESS NOTES
[facility]ITAL[facility]  Progress note     Teri Soliman Patient Status:  Emergency    3/26/1940 MRN [id number]   Location [facility] Attending Emani Richards MD   Hosp Day # 1 PCP [facility]     Chief Kailey arthritis-resume plaquenil, home medication  4. CAD  1. ASA/statin  2. Hold BB for now as BP borderline low  5. GERD  1. PPI    Plan of care: ID consult; continue abx for now.  D/c isolation    Quality:  · DVT Prophylaxis: lovenox  · CODE status: full  · Fo

## 2020-12-16 NOTE — CONSULTS
INFECTIOUS DISEASE CONSULTATION    Teri Soliman Patient Status:  Inpatient    3/26/1940 MRN [id number]   [facility] 4NW-A Attending Darshana Ceballos MD   Hosp Day # 1 PCP Cite Homar Roberts MG Oral Tab EC, Take 81 mg by mouth daily. , Disp: , Rfl:     •  celecoxib 200 MG Oral Cap, Take 200 mg by mouth 2 (two) times daily. , Disp: , Rfl:     •  Esomeprazole Magnesium 40 MG Oral Capsule Delayed Release, Take 40 mg by mouth every morning before br 30   BILT 0.7   TP 8.5*           Established Problem list:  Patient Active Problem List:     Rheumatoid arthritis(714.0)     Unspecified diffuse connective tissue disease     Back pain     TB (pulmonary tuberculosis)     Encounter for long-term (current)

## 2020-12-16 NOTE — PROGRESS NOTES
NURSING ADMISSION NOTE      Patient admitted via Ambulance from [place] [facility] due to cough, Pneumonia, patient had treated  For COVID Pna last month,  Afebrile on admission, occ cough noted, stated that sometimes he felt something stuck in his throat,

## 2020-12-16 NOTE — SLP NOTE
ADULT SWALLOWING EVALUATION    ASSESSMENT    ASSESSMENT/OVERALL IMPRESSION:  Orders were received for a bedside swallowing evaluation as patient reports trouble with swallowing. Patient with diagnosis of COVID 19 in October, 2020.  PMHx significant for CAD hypertension      Past Medical History  Past Medical History:   Diagnosis Date   • Arthritis    • Esophageal reflux    • Unspecified essential hypertension        Prior Living Situation: Home with support  Diet Prior to Admission: Regular; Thin liquids  Pre WORN:  Mask, Gloves, Gown, Surgical Cap, Goggles, P100 Respirator. Patient without a mask due to nature of swallowing assessment.      Thank you for your referral.   If you have any questions, please contact Luz Marina Combs

## 2020-12-16 NOTE — PLAN OF CARE
Assumed care of patient at [address] [place] [address]. Patient A&Ox4, pleasant and forgetful at times. On RA. . Mild desaturation with activity but recovers quickly. Reviewed IS & cough/deep breathing. Sputum cx sent. NSR on telemetry. SQ Lovenox. Incontinent of bladder. Evaluate effectiveness of antiarrhythmic and heart rate control medications as ordered  - Initiate emergency measures for life threatening arrhythmias  - Monitor electrolytes and administer replacement therapy as ordered  Outcome: Progressing     Problem: ADULT  Goal: Return mobility to safest level of function  Description: INTERVENTIONS:  - Assess patient stability and activity tolerance for standing, transferring and ambulating w/ or w/o assistive devices  - Assist with transfers and ambulation using saf cardiac output and hemodynamic stability  Description: INTERVENTIONS:  - Monitor vital signs, rhythm, and trends  - Monitor for bleeding, hypotension and signs of decreased cardiac output  - Evaluate effectiveness of vasoactive medications to optimize hemo treat as appropriate  - Assist with meals as needed  - Monitor I&O, WT and lab values  - Obtain nutritional consult as needed  - Optimize oral hygiene and moisture  - Encourage food from home; allow for food preferences  - Enhance eating environment  [employer] Progressing

## 2020-12-16 NOTE — PHYSICAL THERAPY NOTE
PHYSICAL THERAPY EVALUATION - INPATIENT     Room Number: 505/523-V  Evaluation Date: 12/16/2020  Type of Evaluation: Initial  Physician Order: PT Eval and Treat    Presenting Problem: B PNA  Reason for Therapy: Mobility Dysfunction and Discharge Plan Fair  Static Standing: Fair -  Dynamic Standing: Poor +    ADDITIONAL TESTS                                    NEUROLOGICAL FINDINGS                      ACTIVITY TOLERANCE                         O2 WALK  SPO2 on Room Air at Rest: 92  SPO2 Ambulation on R PNA.  Pertinent comorbidities and personal factors impacting therapy include recent admission in October 2020 for Covid-19. In this PT evaluation, the patient presents with the following impairments decreased balance, strength, safety and endurance.   Func

## 2020-12-17 PROCEDURE — 99232 SBSQ HOSP IP/OBS MODERATE 35: CPT | Performed by: HOSPITALIST

## 2020-12-17 NOTE — PLAN OF CARE
Problem: SAFETY ADULT - FALL  Goal: Free from fall injury  Description: INTERVENTIONS:  - Assess pt frequently for physical needs  - Identify cognitive and physical deficits and behaviors that affect risk of falls.   - [place] fall precautions as indica ventilation and oxygenation  Description: INTERVENTIONS:  - Assess for changes in respiratory status  - Assess for changes in mentation and behavior  - Position to facilitate oxygenation and minimize respiratory effort  - Oxygen supplementation based on ox

## 2020-12-17 NOTE — HOME CARE LIAISON
[facility] Spoke with patient daughter to discuss home health services and offer choice. Patient/family is agreeable to [facility] services at discharge. Brochure and contact information provided. Any questions addressed. Will follow.

## 2020-12-17 NOTE — PROGRESS NOTES
[facility]                INFECTIOUS DISEASE PROGRESS NOTE    Aleja Later Patient Status:  Inpatient    3/26/1940 MRN [id number]   [facility] 4NW-A Attending Nael Thomas MD   [facility] Day # 2 PCP Aimee Maher 40. Active Problem List:     Rheumatoid arthritis(714.0)     Unspecified diffuse connective tissue disease     Back pain     TB (pulmonary tuberculosis)     Encounter for long-term (current) use of other medications     HTN (hypertension)     COVID-19     Hypo

## 2020-12-17 NOTE — PHYSICAL THERAPY NOTE
PHYSICAL THERAPY TREATMENT NOTE - INPATIENT    Room Number: 304/946-O     Session: 1   Number of Visits to Meet Established Goals: 4    Presenting Problem: B PNA     History related to current admission: Pt admitted from home with B PNA.  Pt recently admit lying on back to sitting on the side of the bed?: A Little   How much help from another person does the patient currently need. ..   -   Moving to and from a bed to a chair (including a wheelchair)?: A Little   -   Need to walk in hospital room?: A Little from skilled inpatient PT to address the above deficits to assist patient in returning to prior to level of function. DISCHARGE RECOMMENDATIONS  PT Discharge Recommendations: Home with home health PT; Intermittent Supervision     PLAN  [facility] [place]

## 2020-12-17 NOTE — PLAN OF CARE
Alert and oriented, still weak but better,  worked  with PT today. Afebrile. Still Coughing , tessalon pearles given as prn,  dim. Breath sounds, tolerating  diet. ON daily ABT re Pna,  possible discharge tomorrow on oral ABT. Patient updated.       Proble ADULT  Goal: Achieves optimal ventilation and oxygenation  Description: INTERVENTIONS:  - Assess for changes in respiratory status  - Assess for changes in mentation and behavior  - Position to facilitate oxygenation and minimize respiratory effort  - Oxyg

## 2020-12-17 NOTE — CM/SW NOTE
12/17/20 1400   CM/SW Referral Data   Referral Source Physician   Reason for Referral Discharge planning   Informant Children   Patient [address]   Number of Levels in Home 1   Patient lives with Spouse; Children   Patient S

## 2020-12-18 ENCOUNTER — APPOINTMENT (OUTPATIENT)
Dept: GENERAL RADIOLOGY | Facility: HOSPITAL | Age: 80
DRG: 179 | End: 2020-12-18
Attending: HOSPITALIST
Payer: MEDICARE

## 2020-12-18 PROCEDURE — 71046 X-RAY EXAM CHEST 2 VIEWS: CPT | Performed by: HOSPITALIST

## 2020-12-18 PROCEDURE — 99232 SBSQ HOSP IP/OBS MODERATE 35: CPT | Performed by: HOSPITALIST

## 2020-12-18 RX ORDER — SODIUM CHLORIDE 9 MG/ML
INJECTION, SOLUTION INTRAVENOUS CONTINUOUS
Status: DISCONTINUED | OUTPATIENT
Start: 2020-12-18 | End: 2020-12-19

## 2020-12-18 NOTE — PROGRESS NOTES
[facility]ITAL[facility]  Progress Note     Daniela Daniel Patient Status:  Inpatient    3/26/1940 MRN [id number]   [facility] 4NW-A Attending Parvin Verduzco MD   Hosp Day # 3 PCP Юлия Riggs     Chief Complaint: sob weakness fatigue coug Epic.    Medications:   • Hydroxychloroquine Sulfate  200 mg Oral Daily   • cefTRIAXone  1 g Intravenous Q24H   • aspirin  81 mg Oral Daily   • atorvastatin  20 mg Oral Nightly   • Pantoprazole Sodium  40 mg Oral QAM AC   • enoxaparin  40 mg Subcutaneous N

## 2020-12-18 NOTE — PLAN OF CARE
Pt. A&O x4, can be forgetful at times. VSS. Afebrile. Pt. C/o pain to Right knee. PRN tylenol given per MAR. Right knee noted to be swollen. Pt. Remains on room air; O2 sats mid 90's. Does not desat while ambulating.  Pt. Able to ambulate to the bathroom wi baseline  Description: INTERVENTIONS:  - Continuous cardiac monitoring, monitor vital signs, obtain 12 lead EKG if indicated  - Evaluate effectiveness of antiarrhythmic and heart rate control medications as ordered  - Initiate emergency measures for life t medication profile  - Implement strategies to promote bladder emptying  Outcome: Progressing       Problem: MUSCULOSKELETAL - ADULT  Goal: Return mobility to safest level of function  Description: INTERVENTIONS:  - Assess patient stability and activity jayant

## 2020-12-18 NOTE — PLAN OF CARE
A&O x4. VSS. Tele, NSR. Room air. Pain controlled. Ambulating with assist. Voids freely, incontinent at times. Reviewed POC, pain management, and fall precautions with pt. Bed alarm on w/bed in lowest position. Pt reminded to use call light.  Verbalized und

## 2020-12-18 NOTE — PHYSICAL THERAPY NOTE
PHYSICAL THERAPY TREATMENT NOTE - INPATIENT    Room Number: 108/301-A     Session: 2  Number of Visits to Meet Established Goals: 4    Presenting Problem: B PNA     History related to current admission: Pt admitted from home with B PNA.  Pt recently admitt the side of the bed?: None   How much help from another person does the patient currently need. ..   -   Moving to and from a bed to a chair (including a wheelchair)?: A Little   -   Need to walk in hospital room?: A Little   -   Climbing 3-5 steps with a r is below baseline and would benefit from skilled inpatient PT to address the above deficits to assist patient in returning to prior to level of function. DISCHARGE RECOMMENDATIONS  PT Discharge Recommendations: Home with home health PT; Intermittent [place]

## 2020-12-18 NOTE — PLAN OF CARE
Pt. A&O x4. VSS. Afebrile. Pt. Remains on room air; O2 sats mid 90's. Pt. Does c/o slight pain to right knee; right knee noted to be swollen. Pt. States he feels weaker and worse than yesterday. IV antibiotics. Pt. Working with PT today.  Fall precautions i and administer replacement therapy as ordered  Outcome: Progressing     Problem: RESPIRATORY - ADULT  Goal: Achieves optimal ventilation and oxygenation  Description: INTERVENTIONS:  - Assess for changes in respiratory status  - Assess for changes in menta w/o assistive devices  - Assist with transfers and ambulation using safe patient handling equipment as needed  - Ensure adequate protection for wounds/incisions during mobilization  - Obtain PT/OT consults as needed  - Advance activity as appropriate  - Co

## 2020-12-18 NOTE — PROGRESS NOTES
[facility]  Progress Note     Ramin Rodgers Patient Status:  Inpatient    3/26/1940 MRN [id number]   [facility] 4NW-A Attending Deanna Oakes MD   Hosp Day # 2 PCP Ayo Martinez     Chief Complaint: worsening cough    S: Haroon Hinojosa Pantoprazole Sodium  40 mg Oral QAM AC   • enoxaparin  40 mg Subcutaneous Nightly       ASSESSMENT / PLAN:     1. Presumed gram negative pneumonia  1. Empiric antibiotics  2. procal negative  3. ID to evaluate  2. Recent COVID19 infection  1.  Tested positi

## 2020-12-19 VITALS
BODY MASS INDEX: 29.43 KG/M2 | DIASTOLIC BLOOD PRESSURE: 71 MMHG | SYSTOLIC BLOOD PRESSURE: 127 MMHG | HEIGHT: 61 IN | RESPIRATION RATE: 18 BRPM | TEMPERATURE: 99 F | WEIGHT: 155.88 LBS | HEART RATE: 86 BPM | OXYGEN SATURATION: 94 %

## 2020-12-19 PROCEDURE — 99239 HOSP IP/OBS DSCHRG MGMT >30: CPT | Performed by: HOSPITALIST

## 2020-12-19 RX ORDER — BENZONATATE 100 MG/1
100 CAPSULE ORAL 3 TIMES DAILY PRN
Qty: 20 CAPSULE | Refills: 0 | Status: SHIPPED | OUTPATIENT
Start: 2020-12-19 | End: 2021-03-26

## 2020-12-19 NOTE — PLAN OF CARE
Pt is alert and oriented x4. Pt has a non-productive cough. Lungs are diminished and clear bilaterally. No dyspnea on exertion. O2 sats above 90% on RA. Pt complained of right knee pain once, medication given, see MAR. NSR, Afebrile, VSS.  Capillary refill l

## 2020-12-19 NOTE — CM/SW NOTE
12/19/20 1618   Discharge disposition   Expected discharge disposition Home or Self   Name of Facillity/Home Care/Hospice Residential   Discharge transportation [facility] set up through [facility] for  of 5:00pm. PCS form completed a

## 2020-12-19 NOTE — PLAN OF CARE
Awake & alert,resting in bed at this time. Confused & forgetful,occasional coughing noted,tessalon rubén given & encouraged to use IS & increase activity. Was up in the chair but patient requested to go back to bed right away.  Patient claims he has had 7 loo vasoactive medications to optimize hemodynamic stability  - Monitor arterial and/or venous puncture sites for bleeding and/or hematoma  - Assess quality of pulses, skin color and temperature  - Assess for signs of decreased coronary artery perfusion - ex. preferences  - Enhance eating environment  Outcome: Progressing     Problem: GENITOURINARY - ADULT  Goal: Absence of urinary retention  Description: INTERVENTIONS:  - Assess patient’s ability to void and empty bladder  - Monitor intake/output and perform b

## 2020-12-23 NOTE — DISCHARGE SUMMARY
[facility]  DISCHARGE SUMMARY     Sushila Ramirez Patient Status:  Inpatient    3/26/1940 MRN [id number]   [facility] 4NW-A Attending No att. providers found   Hosp Day # 4 NOY [place]     Date of Admission: 12/15/2020 (brief descriptions):   •     Lab/Test results pending at Discharge:   ·     Consultants:  • ID    Discharge Medication List:     Discharge Medications      START taking these medications      Instructions Prescription details   benzonatate 100 MG Caps  Com prescription for each of these medications  · benzonatate 100 MG Caps         [place]      Follow-up appointment:   Marina Dhillon  [address] [address] [phone]    In 1 week      Appointments for Next 30 Days 12/23/2020 -

## 2020-12-29 RX ORDER — ATORVASTATIN CALCIUM 20 MG/1
TABLET, FILM COATED ORAL
Qty: 30 TABLET | Refills: 5 | Status: SHIPPED | OUTPATIENT
Start: 2020-12-29

## 2021-01-06 ENCOUNTER — OFFICE VISIT (OUTPATIENT)
Dept: CARDIOLOGY | Age: 81
End: 2021-01-06

## 2021-01-06 VITALS
RESPIRATION RATE: 18 BRPM | WEIGHT: 141 LBS | HEART RATE: 91 BPM | HEIGHT: 61 IN | SYSTOLIC BLOOD PRESSURE: 116 MMHG | DIASTOLIC BLOOD PRESSURE: 70 MMHG | BODY MASS INDEX: 26.62 KG/M2 | OXYGEN SATURATION: 95 %

## 2021-01-06 DIAGNOSIS — I25.10 CORONARY ARTERY DISEASE INVOLVING NATIVE CORONARY ARTERY OF NATIVE HEART WITHOUT ANGINA PECTORIS: Primary | ICD-10-CM

## 2021-01-06 DIAGNOSIS — E78.00 PURE HYPERCHOLESTEROLEMIA: ICD-10-CM

## 2021-01-06 PROCEDURE — 99214 OFFICE O/P EST MOD 30 MIN: CPT | Performed by: INTERNAL MEDICINE

## 2021-01-06 ASSESSMENT — PATIENT HEALTH QUESTIONNAIRE - PHQ9
SUM OF ALL RESPONSES TO PHQ9 QUESTIONS 1 AND 2: 0
2. FEELING DOWN, DEPRESSED OR HOPELESS: NOT AT ALL
SUM OF ALL RESPONSES TO PHQ9 QUESTIONS 1 AND 2: 0
CLINICAL INTERPRETATION OF PHQ9 SCORE: NO FURTHER SCREENING NEEDED
1. LITTLE INTEREST OR PLEASURE IN DOING THINGS: NOT AT ALL
CLINICAL INTERPRETATION OF PHQ2 SCORE: NO FURTHER SCREENING NEEDED

## 2021-01-28 ENCOUNTER — APPOINTMENT (OUTPATIENT)
Dept: GENERAL RADIOLOGY | Age: 81
End: 2021-01-28
Attending: EMERGENCY MEDICINE
Payer: MEDICARE

## 2021-01-28 ENCOUNTER — HOSPITAL ENCOUNTER (EMERGENCY)
Age: 81
Discharge: HOME | End: 2021-01-29
Attending: EMERGENCY MEDICINE
Payer: MEDICARE

## 2021-01-28 DIAGNOSIS — J22 LOWER RESP. TRACT INFECTION: Primary | ICD-10-CM

## 2021-01-28 LAB
ALBUMIN SERPL-MCNC: 2.7 G/DL (ref 3.4–5)
ALBUMIN/GLOB SERPL: 0.6 {RATIO} (ref 1–2)
ALP LIVER SERPL-CCNC: 105 U/L
ALT SERPL-CCNC: 33 U/L
ANION GAP SERPL CALC-SCNC: 5 MMOL/L (ref 0–18)
AST SERPL-CCNC: 32 U/L (ref 15–37)
BASOPHILS # BLD AUTO: 0.08 X10(3) UL (ref 0–0.2)
BASOPHILS NFR BLD AUTO: 1.3 %
BILIRUB SERPL-MCNC: 0.4 MG/DL (ref 0.1–2)
BUN BLD-MCNC: 14 MG/DL (ref 7–18)
BUN/CREAT SERPL: 15.9 (ref 10–20)
CALCIUM BLD-MCNC: 9 MG/DL (ref 8.5–10.1)
CHLORIDE SERPL-SCNC: 103 MMOL/L (ref 98–112)
CO2 SERPL-SCNC: 29 MMOL/L (ref 21–32)
CREAT BLD-MCNC: 0.88 MG/DL
DEPRECATED RDW RBC AUTO: 52.5 FL (ref 35.1–46.3)
EOSINOPHIL # BLD AUTO: 0.17 X10(3) UL (ref 0–0.7)
EOSINOPHIL NFR BLD AUTO: 2.7 %
ERYTHROCYTE [DISTWIDTH] IN BLOOD BY AUTOMATED COUNT: 16.7 % (ref 11–15)
GLOBULIN PLAS-MCNC: 4.9 G/DL (ref 2.8–4.4)
GLUCOSE BLD-MCNC: 105 MG/DL (ref 70–99)
HCT VFR BLD AUTO: 43.8 %
HGB BLD-MCNC: 13.8 G/DL
IMM GRANULOCYTES # BLD AUTO: 0.17 X10(3) UL (ref 0–1)
IMM GRANULOCYTES NFR BLD: 2.7 %
LACTATE SERPL-SCNC: 1.9 MMOL/L (ref 0.4–2)
LYMPHOCYTES # BLD AUTO: 1.52 X10(3) UL (ref 1–4)
LYMPHOCYTES NFR BLD AUTO: 24.3 %
M PROTEIN MFR SERPL ELPH: 7.6 G/DL (ref 6.4–8.2)
MCH RBC QN AUTO: 27.2 PG (ref 26–34)
MCHC RBC AUTO-ENTMCNC: 31.5 G/DL (ref 31–37)
MCV RBC AUTO: 86.4 FL
MONOCYTES # BLD AUTO: 0.56 X10(3) UL (ref 0.1–1)
MONOCYTES NFR BLD AUTO: 8.9 %
NEUTROPHILS # BLD AUTO: 3.76 X10 (3) UL (ref 1.5–7.7)
NEUTROPHILS # BLD AUTO: 3.76 X10(3) UL (ref 1.5–7.7)
NEUTROPHILS NFR BLD AUTO: 60.1 %
OSMOLALITY SERPL CALC.SUM OF ELEC: 285 MOSM/KG (ref 275–295)
PLATELET # BLD AUTO: 247 10(3)UL (ref 150–450)
POTASSIUM SERPL-SCNC: 4.1 MMOL/L (ref 3.5–5.1)
RBC # BLD AUTO: 5.07 X10(6)UL
SARS-COV-2 RNA RESP QL NAA+PROBE: NOT DETECTED
SODIUM SERPL-SCNC: 137 MMOL/L (ref 136–145)
WBC # BLD AUTO: 6.3 X10(3) UL (ref 4–11)

## 2021-01-28 PROCEDURE — 83605 ASSAY OF LACTIC ACID: CPT | Performed by: EMERGENCY MEDICINE

## 2021-01-28 PROCEDURE — 93010 ELECTROCARDIOGRAM REPORT: CPT | Performed by: EMERGENCY MEDICINE

## 2021-01-28 PROCEDURE — 99284 EMERGENCY DEPT VISIT MOD MDM: CPT | Performed by: EMERGENCY MEDICINE

## 2021-01-28 PROCEDURE — 85025 COMPLETE CBC W/AUTO DIFF WBC: CPT | Performed by: EMERGENCY MEDICINE

## 2021-01-28 PROCEDURE — 71045 X-RAY EXAM CHEST 1 VIEW: CPT | Performed by: EMERGENCY MEDICINE

## 2021-01-28 PROCEDURE — 87040 BLOOD CULTURE FOR BACTERIA: CPT | Performed by: EMERGENCY MEDICINE

## 2021-01-28 PROCEDURE — 80053 COMPREHEN METABOLIC PANEL: CPT | Performed by: EMERGENCY MEDICINE

## 2021-01-28 PROCEDURE — 96360 HYDRATION IV INFUSION INIT: CPT | Performed by: EMERGENCY MEDICINE

## 2021-01-28 PROCEDURE — 93005 ELECTROCARDIOGRAM TRACING: CPT

## 2021-01-29 VITALS
HEART RATE: 80 BPM | OXYGEN SATURATION: 96 % | BODY MASS INDEX: 26.06 KG/M2 | WEIGHT: 138 LBS | SYSTOLIC BLOOD PRESSURE: 131 MMHG | RESPIRATION RATE: 18 BRPM | DIASTOLIC BLOOD PRESSURE: 70 MMHG | HEIGHT: 61 IN | TEMPERATURE: 98 F

## 2021-01-29 LAB
ATRIAL RATE: 78 BPM
BILIRUB UR QL STRIP.AUTO: NEGATIVE
CLARITY UR REFRACT.AUTO: CLEAR
COLOR UR AUTO: YELLOW
GLUCOSE UR STRIP.AUTO-MCNC: NEGATIVE MG/DL
KETONES UR STRIP.AUTO-MCNC: NEGATIVE MG/DL
LEUKOCYTE ESTERASE UR QL STRIP.AUTO: NEGATIVE
NITRITE UR QL STRIP.AUTO: NEGATIVE
P AXIS: 20 DEGREES
P-R INTERVAL: 184 MS
PH UR STRIP.AUTO: 5.5 [PH] (ref 4.5–8)
PROT UR STRIP.AUTO-MCNC: NEGATIVE MG/DL
Q-T INTERVAL: 420 MS
QRS DURATION: 114 MS
QTC CALCULATION (BEZET): 478 MS
R AXIS: -29 DEGREES
SP GR UR STRIP.AUTO: 1.02 (ref 1–1.03)
T AXIS: 15 DEGREES
UROBILINOGEN UR STRIP.AUTO-MCNC: 0.2 MG/DL
VENTRICULAR RATE: 78 BPM

## 2021-01-29 PROCEDURE — 81003 URINALYSIS AUTO W/O SCOPE: CPT | Performed by: EMERGENCY MEDICINE

## 2021-01-29 RX ORDER — DOXYCYCLINE HYCLATE 100 MG/1
100 CAPSULE ORAL 2 TIMES DAILY
Qty: 20 CAPSULE | Refills: 0 | Status: SHIPPED | OUTPATIENT
Start: 2021-01-29 | End: 2021-02-08

## 2021-01-29 NOTE — ED PROVIDER NOTES
Patient Seen in: THE [facility] Emergency Department In [place]      History   Patient presents with:  Fever    Stated Complaint: fever,weakness.  positive for Covid in October     HPI/Subjective:   HPI    Patient developed Covid in October of last year and th pallor. HEENT: Pupils equal round reactive light. EOMI. No scleral icterus. Oropharynx is moist.  Neck: Supple without adenopathy  Lungs: Fine crepitations in the bases.   No wheezing  Heart: Apical pulse 84 and regular without murmur or rub  Abdomen: S consistent with improved COVID-19 pneumonia. 2. There continues to be reticular nodular opacities at the right lung base and left mid and lower lung in the periphery representing scarring or fibrotic changes within the lungs.     Dictated by (CST): Nessa Or

## 2021-02-12 ENCOUNTER — APPOINTMENT (OUTPATIENT)
Dept: GENERAL RADIOLOGY | Age: 81
DRG: 195 | End: 2021-02-12
Attending: EMERGENCY MEDICINE
Payer: MEDICARE

## 2021-02-12 ENCOUNTER — HOSPITAL ENCOUNTER (INPATIENT)
Facility: HOSPITAL | Age: 81
LOS: 2 days | Discharge: HOME HEALTH | DRG: 195 | End: 2021-02-15
Attending: EMERGENCY MEDICINE | Admitting: HOSPITALIST
Payer: MEDICARE

## 2021-02-12 DIAGNOSIS — R79.89 ELEVATED LACTIC ACID LEVEL: ICD-10-CM

## 2021-02-12 DIAGNOSIS — R60.0 PEDAL EDEMA: ICD-10-CM

## 2021-02-12 DIAGNOSIS — L89.159 PRESSURE INJURY OF SKIN OF SACRAL REGION, UNSPECIFIED INJURY STAGE: ICD-10-CM

## 2021-02-12 DIAGNOSIS — R50.9 ACUTE FEBRILE ILLNESS: Primary | ICD-10-CM

## 2021-02-12 DIAGNOSIS — R29.6 FALLING EPISODES: ICD-10-CM

## 2021-02-12 LAB
ALBUMIN SERPL-MCNC: 2.1 G/DL (ref 3.4–5)
ALBUMIN/GLOB SERPL: 0.5 {RATIO} (ref 1–2)
ALP LIVER SERPL-CCNC: 99 U/L
ALT SERPL-CCNC: 24 U/L
ANION GAP SERPL CALC-SCNC: 4 MMOL/L (ref 0–18)
AST SERPL-CCNC: 27 U/L (ref 15–37)
BASOPHILS # BLD AUTO: 0.08 X10(3) UL (ref 0–0.2)
BASOPHILS NFR BLD AUTO: 0.8 %
BILIRUB SERPL-MCNC: 0.4 MG/DL (ref 0.1–2)
BILIRUB UR QL STRIP.AUTO: NEGATIVE
BUN BLD-MCNC: 16 MG/DL (ref 7–18)
BUN/CREAT SERPL: 20 (ref 10–20)
CALCIUM BLD-MCNC: 8.4 MG/DL (ref 8.5–10.1)
CHLORIDE SERPL-SCNC: 100 MMOL/L (ref 98–112)
CLARITY UR REFRACT.AUTO: CLEAR
CO2 SERPL-SCNC: 29 MMOL/L (ref 21–32)
COLOR UR AUTO: YELLOW
CREAT BLD-MCNC: 0.8 MG/DL
DEPRECATED RDW RBC AUTO: 51.5 FL (ref 35.1–46.3)
EOSINOPHIL # BLD AUTO: 0.23 X10(3) UL (ref 0–0.7)
EOSINOPHIL NFR BLD AUTO: 2.2 %
ERYTHROCYTE [DISTWIDTH] IN BLOOD BY AUTOMATED COUNT: 16.4 % (ref 11–15)
GLOBULIN PLAS-MCNC: 4.6 G/DL (ref 2.8–4.4)
GLUCOSE BLD-MCNC: 109 MG/DL (ref 70–99)
GLUCOSE UR STRIP.AUTO-MCNC: NEGATIVE MG/DL
HCT VFR BLD AUTO: 39.5 %
HGB BLD-MCNC: 12.2 G/DL
IMM GRANULOCYTES # BLD AUTO: 0.15 X10(3) UL (ref 0–1)
IMM GRANULOCYTES NFR BLD: 1.4 %
KETONES UR STRIP.AUTO-MCNC: NEGATIVE MG/DL
LACTATE SERPL-SCNC: 2.5 MMOL/L (ref 0.4–2)
LEUKOCYTE ESTERASE UR QL STRIP.AUTO: NEGATIVE
LYMPHOCYTES # BLD AUTO: 0.96 X10(3) UL (ref 1–4)
LYMPHOCYTES NFR BLD AUTO: 9.2 %
M PROTEIN MFR SERPL ELPH: 6.7 G/DL (ref 6.4–8.2)
MCH RBC QN AUTO: 26.5 PG (ref 26–34)
MCHC RBC AUTO-ENTMCNC: 30.9 G/DL (ref 31–37)
MCV RBC AUTO: 85.7 FL
MONOCYTES # BLD AUTO: 0.62 X10(3) UL (ref 0.1–1)
MONOCYTES NFR BLD AUTO: 6 %
NEUTROPHILS # BLD AUTO: 8.34 X10 (3) UL (ref 1.5–7.7)
NEUTROPHILS # BLD AUTO: 8.34 X10(3) UL (ref 1.5–7.7)
NEUTROPHILS NFR BLD AUTO: 80.4 %
NITRITE UR QL STRIP.AUTO: NEGATIVE
NT-PROBNP SERPL-MCNC: 636 PG/ML (ref ?–450)
OSMOLALITY SERPL CALC.SUM OF ELEC: 278 MOSM/KG (ref 275–295)
PH UR STRIP.AUTO: 6.5 [PH] (ref 4.5–8)
PLATELET # BLD AUTO: 274 10(3)UL (ref 150–450)
POTASSIUM SERPL-SCNC: 3.6 MMOL/L (ref 3.5–5.1)
PROT UR STRIP.AUTO-MCNC: NEGATIVE MG/DL
RBC # BLD AUTO: 4.61 X10(6)UL
RBC UR QL AUTO: NEGATIVE
SODIUM SERPL-SCNC: 133 MMOL/L (ref 136–145)
SP GR UR STRIP.AUTO: 1.02 (ref 1–1.03)
TROPONIN I SERPL-MCNC: <0.045 NG/ML (ref ?–0.04)
UROBILINOGEN UR STRIP.AUTO-MCNC: 0.2 MG/DL
WBC # BLD AUTO: 10.4 X10(3) UL (ref 4–11)

## 2021-02-12 PROCEDURE — 72170 X-RAY EXAM OF PELVIS: CPT | Performed by: EMERGENCY MEDICINE

## 2021-02-12 PROCEDURE — 72220 X-RAY EXAM SACRUM TAILBONE: CPT | Performed by: EMERGENCY MEDICINE

## 2021-02-12 PROCEDURE — 71045 X-RAY EXAM CHEST 1 VIEW: CPT | Performed by: EMERGENCY MEDICINE

## 2021-02-12 RX ORDER — CHOLECALCIFEROL (VITAMIN D3) 50 MCG
2000 TABLET ORAL DAILY
COMMUNITY

## 2021-02-13 ENCOUNTER — APPOINTMENT (OUTPATIENT)
Dept: GENERAL RADIOLOGY | Facility: HOSPITAL | Age: 81
DRG: 195 | End: 2021-02-13
Attending: INTERNAL MEDICINE
Payer: MEDICARE

## 2021-02-13 ENCOUNTER — APPOINTMENT (OUTPATIENT)
Dept: CT IMAGING | Facility: HOSPITAL | Age: 81
DRG: 195 | End: 2021-02-13
Attending: INTERNAL MEDICINE
Payer: MEDICARE

## 2021-02-13 PROBLEM — R29.6 FALLING EPISODES: Status: ACTIVE | Noted: 2021-02-13

## 2021-02-13 PROBLEM — R50.9 ACUTE FEBRILE ILLNESS: Status: ACTIVE | Noted: 2021-02-13

## 2021-02-13 PROBLEM — L89.159 PRESSURE INJURY OF SKIN OF SACRAL REGION, UNSPECIFIED INJURY STAGE: Status: ACTIVE | Noted: 2021-02-13

## 2021-02-13 PROBLEM — R60.0 PEDAL EDEMA: Status: ACTIVE | Noted: 2021-02-13

## 2021-02-13 PROBLEM — R79.89 ELEVATED LACTIC ACID LEVEL: Status: ACTIVE | Noted: 2021-02-13

## 2021-02-13 LAB
ADENOVIRUS PCR:: NEGATIVE
ANION GAP SERPL CALC-SCNC: 7 MMOL/L (ref 0–18)
B PERT DNA SPEC QL NAA+PROBE: NEGATIVE
BASOPHILS # BLD AUTO: 0.08 X10(3) UL (ref 0–0.2)
BASOPHILS NFR BLD AUTO: 0.8 %
BUN BLD-MCNC: 9 MG/DL (ref 7–18)
BUN/CREAT SERPL: 14.5 (ref 10–20)
C PNEUM DNA SPEC QL NAA+PROBE: NEGATIVE
CALCIUM BLD-MCNC: 8.7 MG/DL (ref 8.5–10.1)
CHLORIDE SERPL-SCNC: 105 MMOL/L (ref 98–112)
CO2 SERPL-SCNC: 27 MMOL/L (ref 21–32)
CORONAVIRUS 229E PCR:: NEGATIVE
CORONAVIRUS HKU1 PCR:: NEGATIVE
CORONAVIRUS NL63 PCR:: NEGATIVE
CORONAVIRUS OC43 PCR:: NEGATIVE
CREAT BLD-MCNC: 0.62 MG/DL
D-DIMER: 3.43 UG/ML FEU (ref ?–0.79)
DEPRECATED RDW RBC AUTO: 51.9 FL (ref 35.1–46.3)
EOSINOPHIL # BLD AUTO: 0.44 X10(3) UL (ref 0–0.7)
EOSINOPHIL NFR BLD AUTO: 4.5 %
ERYTHROCYTE [DISTWIDTH] IN BLOOD BY AUTOMATED COUNT: 16.5 % (ref 11–15)
FLUAV RNA SPEC QL NAA+PROBE: NEGATIVE
FLUBV RNA SPEC QL NAA+PROBE: NEGATIVE
GLUCOSE BLD-MCNC: 96 MG/DL (ref 70–99)
HCT VFR BLD AUTO: 40.9 %
HGB BLD-MCNC: 12.8 G/DL
IMM GRANULOCYTES # BLD AUTO: 0.13 X10(3) UL (ref 0–1)
IMM GRANULOCYTES NFR BLD: 1.3 %
LACTATE SERPL-SCNC: 1.7 MMOL/L (ref 0.4–2)
LYMPHOCYTES # BLD AUTO: 1.18 X10(3) UL (ref 1–4)
LYMPHOCYTES NFR BLD AUTO: 12.1 %
MCH RBC QN AUTO: 27.1 PG (ref 26–34)
MCHC RBC AUTO-ENTMCNC: 31.3 G/DL (ref 31–37)
MCV RBC AUTO: 86.5 FL
METAPNEUMOVIRUS PCR:: NEGATIVE
MONOCYTES # BLD AUTO: 0.29 X10(3) UL (ref 0.1–1)
MONOCYTES NFR BLD AUTO: 3 %
MYCOPLASMA PNEUMONIA PCR:: NEGATIVE
NEUTROPHILS # BLD AUTO: 7.62 X10 (3) UL (ref 1.5–7.7)
NEUTROPHILS # BLD AUTO: 7.62 X10(3) UL (ref 1.5–7.7)
NEUTROPHILS NFR BLD AUTO: 78.3 %
OSMOLALITY SERPL CALC.SUM OF ELEC: 287 MOSM/KG (ref 275–295)
PARAINFLUENZA 1 PCR:: NEGATIVE
PARAINFLUENZA 2 PCR:: NEGATIVE
PARAINFLUENZA 3 PCR:: NEGATIVE
PARAINFLUENZA 4 PCR:: NEGATIVE
PLATELET # BLD AUTO: 282 10(3)UL (ref 150–450)
POTASSIUM SERPL-SCNC: 3.6 MMOL/L (ref 3.5–5.1)
POTASSIUM SERPL-SCNC: 4.3 MMOL/L (ref 3.5–5.1)
PROCALCITONIN SERPL-MCNC: 0.08 NG/ML (ref ?–0.16)
RBC # BLD AUTO: 4.73 X10(6)UL
RHINOVIRUS/ENTERO PCR:: NEGATIVE
RSV RNA SPEC QL NAA+PROBE: NEGATIVE
SARS-COV-2 RNA RESP QL NAA+PROBE: NOT DETECTED
SODIUM SERPL-SCNC: 139 MMOL/L (ref 136–145)
WBC # BLD AUTO: 9.7 X10(3) UL (ref 4–11)

## 2021-02-13 PROCEDURE — 99223 1ST HOSP IP/OBS HIGH 75: CPT | Performed by: HOSPITALIST

## 2021-02-13 PROCEDURE — 74018 RADEX ABDOMEN 1 VIEW: CPT | Performed by: INTERNAL MEDICINE

## 2021-02-13 PROCEDURE — 71275 CT ANGIOGRAPHY CHEST: CPT | Performed by: INTERNAL MEDICINE

## 2021-02-13 RX ORDER — PANTOPRAZOLE SODIUM 40 MG/1
40 TABLET, DELAYED RELEASE ORAL
Status: DISCONTINUED | OUTPATIENT
Start: 2021-02-14 | End: 2021-02-15

## 2021-02-13 RX ORDER — ONDANSETRON 2 MG/ML
4 INJECTION INTRAMUSCULAR; INTRAVENOUS EVERY 6 HOURS PRN
Status: DISCONTINUED | OUTPATIENT
Start: 2021-02-13 | End: 2021-02-15

## 2021-02-13 RX ORDER — SODIUM PHOSPHATE, DIBASIC AND SODIUM PHOSPHATE, MONOBASIC 7; 19 G/133ML; G/133ML
1 ENEMA RECTAL ONCE AS NEEDED
Status: DISCONTINUED | OUTPATIENT
Start: 2021-02-13 | End: 2021-02-15

## 2021-02-13 RX ORDER — BISACODYL 10 MG
10 SUPPOSITORY, RECTAL RECTAL
Status: DISCONTINUED | OUTPATIENT
Start: 2021-02-13 | End: 2021-02-15

## 2021-02-13 RX ORDER — ONDANSETRON 2 MG/ML
4 INJECTION INTRAMUSCULAR; INTRAVENOUS EVERY 4 HOURS PRN
Status: ACTIVE | OUTPATIENT
Start: 2021-02-13 | End: 2021-02-13

## 2021-02-13 RX ORDER — DOCUSATE SODIUM 100 MG/1
100 CAPSULE, LIQUID FILLED ORAL 2 TIMES DAILY
Status: DISCONTINUED | OUTPATIENT
Start: 2021-02-13 | End: 2021-02-15

## 2021-02-13 RX ORDER — ACETAMINOPHEN 325 MG/1
650 TABLET ORAL EVERY 6 HOURS PRN
Status: DISCONTINUED | OUTPATIENT
Start: 2021-02-13 | End: 2021-02-15

## 2021-02-13 RX ORDER — POTASSIUM CHLORIDE 20 MEQ/1
40 TABLET, EXTENDED RELEASE ORAL EVERY 4 HOURS
Status: COMPLETED | OUTPATIENT
Start: 2021-02-13 | End: 2021-02-13

## 2021-02-13 RX ORDER — HYDROXYCHLOROQUINE SULFATE 200 MG/1
200 TABLET, FILM COATED ORAL DAILY
Status: DISCONTINUED | OUTPATIENT
Start: 2021-02-13 | End: 2021-02-15

## 2021-02-13 RX ORDER — ENOXAPARIN SODIUM 100 MG/ML
40 INJECTION SUBCUTANEOUS DAILY
Status: DISCONTINUED | OUTPATIENT
Start: 2021-02-13 | End: 2021-02-15

## 2021-02-13 RX ORDER — IPRATROPIUM BROMIDE 21 UG/1
2 SPRAY, METERED NASAL 3 TIMES DAILY
Status: DISCONTINUED | OUTPATIENT
Start: 2021-02-13 | End: 2021-02-15

## 2021-02-13 RX ORDER — IPRATROPIUM BROMIDE AND ALBUTEROL SULFATE 2.5; .5 MG/3ML; MG/3ML
3 SOLUTION RESPIRATORY (INHALATION) EVERY 4 HOURS PRN
Status: DISCONTINUED | OUTPATIENT
Start: 2021-02-13 | End: 2021-02-13

## 2021-02-13 RX ORDER — METOCLOPRAMIDE HYDROCHLORIDE 5 MG/ML
10 INJECTION INTRAMUSCULAR; INTRAVENOUS EVERY 8 HOURS PRN
Status: DISCONTINUED | OUTPATIENT
Start: 2021-02-13 | End: 2021-02-15

## 2021-02-13 RX ORDER — LIDOCAINE HYDROCHLORIDE 40 MG/ML
2.5 INJECTION, SOLUTION RETROBULBAR; TOPICAL ONCE
Status: COMPLETED | OUTPATIENT
Start: 2021-02-13 | End: 2021-02-13

## 2021-02-13 RX ORDER — BENZONATATE 100 MG/1
100 CAPSULE ORAL 3 TIMES DAILY PRN
Status: DISCONTINUED | OUTPATIENT
Start: 2021-02-13 | End: 2021-02-15

## 2021-02-13 RX ORDER — SODIUM CHLORIDE 9 MG/ML
INJECTION, SOLUTION INTRAVENOUS CONTINUOUS
Status: ACTIVE | OUTPATIENT
Start: 2021-02-13 | End: 2021-02-13

## 2021-02-13 RX ORDER — POLYETHYLENE GLYCOL 3350 17 G/17G
17 POWDER, FOR SOLUTION ORAL DAILY PRN
Status: DISCONTINUED | OUTPATIENT
Start: 2021-02-13 | End: 2021-02-15

## 2021-02-13 RX ORDER — ATORVASTATIN CALCIUM 20 MG/1
20 TABLET, FILM COATED ORAL NIGHTLY
Status: DISCONTINUED | OUTPATIENT
Start: 2021-02-13 | End: 2021-02-15

## 2021-02-13 RX ORDER — ASPIRIN 81 MG/1
81 TABLET ORAL DAILY
Status: DISCONTINUED | OUTPATIENT
Start: 2021-02-13 | End: 2021-02-15

## 2021-02-13 RX ORDER — ALBUTEROL SULFATE 90 UG/1
2 AEROSOL, METERED RESPIRATORY (INHALATION) EVERY 4 HOURS PRN
Status: DISCONTINUED | OUTPATIENT
Start: 2021-02-13 | End: 2021-02-15

## 2021-02-13 NOTE — ED NOTES
Assumed care of patient at this time, report rcvd by LILLY Cali. Patient currently out of department, radiology, for testing.

## 2021-02-13 NOTE — ED NOTES
Patient unable to void in toilet. ERMGILA notified. Patient reports last time he had to be straight cath. MD notified, order placed.

## 2021-02-13 NOTE — PROGRESS NOTES
Pt A&Ox4 Room Air  Resting in bed  C/O discomfort to tailbone  Abx Zosyn and Azithromycin (Pnemonia)  Wound care, PT place on consult  PIV SL  Lactic 2.5 repeat 1.7  Flu swab completed results pending.    Safety precaution maintained  Will continue to monit

## 2021-02-13 NOTE — ED INITIAL ASSESSMENT (HPI)
Patient to er with c/o sores on his buttocks and started running fevers. Patient was diagnosed with COVID 10/31/2020. Patient has swelling to bilateral ankles.

## 2021-02-13 NOTE — H&P
[facility]  History and Physical     Kaylin Richard Patient Status:  Inpatient    3/26/1940 MRN [id number]   [facility] 3NE-A Attending [place] Verduzco [address] Day # 0 PCP Ugo Hdz     Chief Complaint: Cough daily., Disp: , Rfl:     •  aspirin 81 MG Oral Tab EC, Take 81 mg by mouth daily. , Disp: , Rfl:     •  celecoxib 200 MG Oral Cap, Take 200 mg by mouth 2 (two) times daily. , Disp: , Rfl:     •  Esomeprazole Magnesium 40 MG Oral Capsule Delayed Release, Take Creatinine Clearance: 55.4 mL/min (based on SCr of 0.8 mg/dL). No results for input(s): PTP, INR in the last 168 hours. Recent Labs   Lab 02/12/21  2143   TROP <0.045       Imaging: Imaging data reviewed in Epic. ASSESSMENT / PLAN:     1.  Pneumo

## 2021-02-13 NOTE — PHYSICAL THERAPY NOTE
PHYSICAL THERAPY EVALUATION - INPATIENT     Room Number: 5498/9659-X  Evaluation Date: 2/13/2021  Type of Evaluation: Initial  Physician Order: PT Eval and Treat    Presenting Problem: Cough and SOB  Reason for Therapy: Mobility Dysfunction and Disch is within functional limits     Lower extremity strength is within functional limits     BALANCE  Static Sitting: Good  Dynamic Sitting: Good  Static Standing: Fair  Dynamic Standing: Fair -    ADDITIONAL TESTS           NEUROLOGICAL FINDINGS        ACTIVI training  Transfer training    Patient End of Session: In bathroom - nursing staff aware    ASSESSMENT   Patient is a 79year old male admitted on 2/12/2021 for Cough and SOB  from homePertinent comorbidities and personal factors impacting therapy include:

## 2021-02-13 NOTE — CM/SW NOTE
02/13/21 1500   CM/SW Referral Data   Referral Source Physician   Reason for Referral Discharge planning   Informant Children   Patient [address]   Number of Levels in Home 1   Patient lives with Spouse; Children   Patient S

## 2021-02-13 NOTE — CONSULTS
Pulmonary H&P/Consult       NAME: Ray Masters - ROOM: [facility]1685-Y - MRN: [id number] - Age: 79year old - :  3/26/1940    Date of Admission: 2021  9:16 PM  Admission Diagnosis: Pedal edema [R60.0]  Acute febrile illness [R50.9]  Falling e 2/12/2021 at 2100    •  aspirin 81 MG Oral Tab EC, Take 81 mg by mouth daily. , Disp: , Rfl: , 2/12/2021 at 0900    •  celecoxib 200 MG Oral Cap, Take 200 mg by mouth 2 (two) times daily. , Disp: , Rfl: , 2/12/2021 at 2100    •  Esomeprazole Magnesium 40 MG [employer] service: Not on file        Active member of club or organization: Not on file        Attends meetings of clubs or organizations: Not on file        Relationship status: Not on file      Intimate partner violence        Fear of current or ex part unusual skin lesions   EYES:  denies blurred vision or double vision   HEENT:  denies nasal congestion, sinus pain/tenderness  RESPIRATORY: see above   CARDIOVASCULAR:  denies current chest pain   GI:  denies abdominal pain  :  denies dysuria or changes tenderness   Lungs:     Clear to auscultation bilaterally, respirations unlabored   Chest wall:    No tenderness or deformity   Heart:    Regular rate and rhythm, S1 and S2 normal, no murmur, rub   or gallop   Abdomen:     Soft, non-tender, bowel sounds ac

## 2021-02-13 NOTE — ED NOTES
Report given to floor RN Raman Hunter. Patient and daughter updated on bed assignment. Ambulance transportation being arranged. ETA 15-20 minutes. Patient continues to have abx infusing.

## 2021-02-13 NOTE — PROGRESS NOTES
Emesis triggered by coughing. Zofran given. Unable to cough up phlegm. Bilat crackles noted to lung bases. Prn neb tx ordered. O2 sats 96% on RA. Pulmonary called on consult.

## 2021-02-13 NOTE — ED PROVIDER NOTES
Patient Seen in: [facility] 3ne-a      History   Patient presents with:  Swelling Edema  Fever    Stated Complaint: bilat ankle swelling, fever     HPI/Subjective:   HPI    Patient is brought for evaluation by his daughter with whom he lives with mu nontoxic in appearance. Skin: Warm and dry without cyanosis or pallor. Turgor normal  HEENT: Pupils equal round reactive to light. EOMI. Oropharynx moist.  Throat grossly normal.  No tenderness to the scalp or facial bones.   Neck: No tenderness or swel DIFFERENTIAL[[id number]]          Abnormal            Final result                 Please view results for these tests on the individual orders.    URINALYSIS WITH CULTURE REFLEX   BLOOD CULTURE   BLOOD CULTURE     EKG: Sinus tachycardia with ventricular rat [facility] for further evaluation and treatment.       Admission disposition: 2/13/2021 12:08 AM                              Disposition and Plan     Clinical Impression:  Acute febrile illness  (primary encounter diagnosis)  Elevated lactic acid leve

## 2021-02-13 NOTE — HOME CARE LIAISON
Patient is pending (re-certified) with [facility], and will need a SRIDEVI order on or before the day of DC.

## 2021-02-14 ENCOUNTER — APPOINTMENT (OUTPATIENT)
Dept: GENERAL RADIOLOGY | Facility: HOSPITAL | Age: 81
DRG: 195 | End: 2021-02-14
Attending: INTERNAL MEDICINE
Payer: MEDICARE

## 2021-02-14 ENCOUNTER — APPOINTMENT (OUTPATIENT)
Dept: ULTRASOUND IMAGING | Facility: HOSPITAL | Age: 81
DRG: 195 | End: 2021-02-14
Attending: INTERNAL MEDICINE
Payer: MEDICARE

## 2021-02-14 PROCEDURE — 99232 SBSQ HOSP IP/OBS MODERATE 35: CPT | Performed by: INTERNAL MEDICINE

## 2021-02-14 PROCEDURE — 93970 EXTREMITY STUDY: CPT | Performed by: INTERNAL MEDICINE

## 2021-02-14 PROCEDURE — 71046 X-RAY EXAM CHEST 2 VIEWS: CPT | Performed by: INTERNAL MEDICINE

## 2021-02-14 PROCEDURE — 74018 RADEX ABDOMEN 1 VIEW: CPT | Performed by: INTERNAL MEDICINE

## 2021-02-14 NOTE — PROGRESS NOTES
Pt A&Ox4 Room Air  Resting in bed  C/O discomfort to tailbone reposition  Abx Zosyn and Azithromycin (Pnemonia)  Wound care to see  PIV SL  Flu panel (-)  PT rec Home w/home PT  Safety precaution maintained  Will continue to monitor

## 2021-02-14 NOTE — PROGRESS NOTES
Still w/nonproductive cough. No further emesis but decreased appetite. Only soup and jello for dinner.

## 2021-02-14 NOTE — PROGRESS NOTES
Feels better today. Ate breakfast this am. No nausea/vomiting. Occas cough-nonproductive. Afebrile this shift. O2 sats 95-97% on RA.

## 2021-02-14 NOTE — PROGRESS NOTES
[facility] PULMONARY/CRITICAL CARE    S: Any Figueroa is a 79year old male with a history of hypertension, GERD, CAD s/p CABG, and rheumatoid arthritis who had COVID in 10/2020 followed by bacterial pneumonia requiring hospitalizaiton in 12/2020.   He 8.4* 8.7  --    ALKPHO 99  --   --    AST 27  --   --    ALT 24  --   --    BILT 0.4  --   --    TP 6.7  --   --          Recent Labs   Lab 02/12/21 2143   TROP <0.045       Recent Labs   Lab 02/12/21 2143   PCT 0.08            Imaging reviewed    Assess

## 2021-02-14 NOTE — PROGRESS NOTES
Tolerated diet. No nausea or vomiting. No stools this shift. Afebrile this shift.  Up in room w/walker and 1 assist.

## 2021-02-14 NOTE — PROGRESS NOTES
[facility]  Progress Note    Any Figueroa Patient Status:  Inpatient    3/26/1940 MRN [id number]   [facility] 3NE-A Attending Page Gilbert MD   Hosp Day # 1 PCP Maame Moy     CC: Cough and shortness of breath    SUBJECT COMPARISON:  None. INDICATIONS:  elevated d-dimer, r/o PE     TECHNIQUE:  IV contrast-enhanced multislice CT angiography is performed through the pulmonary arterial anatomy. 3D volume renderings are generated. Dose reduction techniques were used.  Do Fleischner guidelines, solid nodules measuring less than 6 mm require no further followup in low risk patients. In high-risk patients with a history of smoking or   other risk factors, consider CT at 12 months.  For nodules between 6-8 mm in low risk patien measuring up to 3.1 cm. This may represent small bowel obstruction.   Limited assessment for free intraperitoneal air on the supine radiographs.                     =====  CONCLUSION:  Mildly prominent loops of small bowel are present with some fecal mater interstitial and airspace disease in the mid to lower lungs bilaterally. Stable kyphoplasty changes noted. Stable small bilateral pleural effusions. No pneumothorax.   Degenerative changes in the spine.                   =====  CONCLUSION:  No signific Oral, Daily        ASSESSMENT / PLAN:     1. Fever and cough and shortness of breath due to bilateral interstitial pneumonia in mid to lower lung  1. Patient on IV antibiotics IV Zosyn and IV azithromycin  2. Procalcitonin level 0.08  3.  Respiratory flu pa with patient's family over phone[discussed with patient's daughter Jason Palafox who updated that patient has had previous colonoscopy about 2 years back]        Pepe Spann MD  2/14/2021  2:42 PM

## 2021-02-14 NOTE — PROGRESS NOTES
Elevated D Dimer results called to Dr Kaz Whitman. Pulmonary aware. CTA chest, abd xray and venous dopplers ordered.

## 2021-02-15 ENCOUNTER — APPOINTMENT (OUTPATIENT)
Dept: CV DIAGNOSTICS | Facility: HOSPITAL | Age: 81
DRG: 195 | End: 2021-02-15
Attending: INTERNAL MEDICINE
Payer: MEDICARE

## 2021-02-15 VITALS
DIASTOLIC BLOOD PRESSURE: 58 MMHG | HEART RATE: 86 BPM | TEMPERATURE: 98 F | BODY MASS INDEX: 28.51 KG/M2 | WEIGHT: 151 LBS | SYSTOLIC BLOOD PRESSURE: 113 MMHG | HEIGHT: 61 IN | OXYGEN SATURATION: 96 % | RESPIRATION RATE: 18 BRPM

## 2021-02-15 LAB
ATRIAL RATE: 105 BPM
C DIFF TOX B STL QL: NEGATIVE
P AXIS: 31 DEGREES
P-R INTERVAL: 180 MS
PROCALCITONIN SERPL-MCNC: 0.09 NG/ML (ref ?–0.16)
Q-T INTERVAL: 360 MS
QRS DURATION: 118 MS
QTC CALCULATION (BEZET): 475 MS
R AXIS: -33 DEGREES
T AXIS: 81 DEGREES
VENTRICULAR RATE: 105 BPM

## 2021-02-15 PROCEDURE — 99239 HOSP IP/OBS DSCHRG MGMT >30: CPT | Performed by: INTERNAL MEDICINE

## 2021-02-15 PROCEDURE — 93306 TTE W/DOPPLER COMPLETE: CPT | Performed by: INTERNAL MEDICINE

## 2021-02-15 RX ORDER — CELECOXIB 200 MG/1
200 CAPSULE ORAL 2 TIMES DAILY PRN
COMMUNITY

## 2021-02-15 RX ORDER — AMOXICILLIN AND CLAVULANATE POTASSIUM 875; 125 MG/1; MG/1
1 TABLET, FILM COATED ORAL 2 TIMES DAILY
Qty: 8 TABLET | Refills: 0 | Status: SHIPPED | OUTPATIENT
Start: 2021-02-15 | End: 2021-02-19

## 2021-02-15 NOTE — DISCHARGE SUMMARY
[facility]  Discharge Summary    Dee Dee Dennis Patient Status:  Inpatient    3/26/1940 MRN [id number]   [facility] 3NE-A Attending No att. providers found   Hosp Day # 2 PCP Raisa Banks     Date of Admission: 2021    Da may be aggravated by GERD and postnasal drip. Patient states his cough lingered and he has been admitted on and off with similar symptoms. Current admission, Patient treated with IV antibiotics for pneumonia. Seen by pulmonary.   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 Refills: 0     Aspirin Buf(CaCarb-MgCarb-MgO) 81 MG Tabs      Take 81 mg by mouth daily.    Refills: 0     atorvastatin 20 MG T 2 weeks  Pulmonary as directed     Appointments for Next 30 Days 2/15/2021 - 3/17/2021      Date Arrival Time Visit Type Length Department Provider     2/18/2021  1:00 PM  [facility] H[facility]PITAL FOLLOW UP [7086] 60 min [facility] Belen Crawford extremities normal,no cyanosis or edema  Pulses: 2+ and symmetric  Skin: Skin color, texture, turgor normal. No rashes or lesions  Neurologic: Awake,alert,nonfocal  Psych: Mood and affect appears normal      Discharge Condition: stable    Patient Discharge

## 2021-02-15 NOTE — PROGRESS NOTES
[facility]    Rajendra Lockett Patient Status:  Inpatient    3/26/1940 MRN [id number]   [facility] 3NE-A Attending Andre Chong MD   [facility] Day # 2 PCP Sharyon Meckel     [facility] / Critical Care Progress Note     S: pt states he feel HCT 39.5 40.9   .0 282.0     No results for input(s): INR in the last 168 hours.       Recent Labs   Lab 02/12/21  2143 02/13/21  0837 02/13/21  1906   * 139  --    K 3.6 3.6 4.3    105  --    CO2 29.0 27.0  --    BUN 16 9  --      Crea

## 2021-02-15 NOTE — PROGRESS NOTES
[facility]  Progress Note    Pilar Driver Patient Status:  Inpatient    3/26/1940 MRN [id number]   [facility] 3NE-A Attending Davin Green MD   [facility] Day # 2 PCP Jayleen Mccarty     CC: Cough and shortness of breath    SUBJECT through the pulmonary arterial anatomy. 3D volume renderings are generated. Dose reduction techniques were used.  Dose information is transmitted to the [facility] of   Radiology) Malika Jaeger [address] ([address]) which includes the Dose I history of smoking or   other risk factors, consider CT at 12 months. For nodules between 6-8 mm in low risk patients, followup at 6-12 months is recommended with consideration given to further followup at 18-24 months.  In high-risk patients, a followup CT radiographs.                     =====  CONCLUSION:  Mildly prominent loops of small bowel are present with some fecal material noted within the colon. The findings are somewhat nonspecific. Small bowel obstruction may be possible.   If there is persisten effusions. No pneumothorax. Degenerative changes in the spine.                   =====  CONCLUSION:  No significant interval change.            Dictated by (CST): Autumn Tinsley MD on 2/14/2021 at 1:00 PM  Meds:     •  Enoxaparin Sodium (LOVENOX) 40 MG 0. 08  3. Respiratory flu panel negative  4. Covid screening done on admission on 2/12/2021 negative  5. Blood culture with no growth  6. Patient not bringing up any phlegm so sputum not done  7. White count normal  8. Await pulmonary eval  9.  Speech and sw bedside]    Plan of care: Await wound care consult. Await 2D echo. After these, plan discharge home with home health if okay with pulmonary. Follow-up with pulmonary as directed.   Follow-up with regular outpatient gastroenterologist for follow-up as dis

## 2021-02-15 NOTE — PHYSICAL THERAPY NOTE
PHYSICAL THERAPY TREATMENT NOTE - INPATIENT    Room Number: 6010/3406-I     Session: 1       Presenting Problem: Cough and SOB  Reason for Therapy: Mobility Dysfunction and Discharge Planning     History related to current admission:   From home with blossom blankets)?: None   -   Sitting down on and standing up from a chair with arms (e.g., wheelchair, bedside commode, etc.): A Little   -   Moving from lying on back to sitting on the side of the bed?: None   How much help from another person does the patient session/findings; All patient questions and concerns addressed; Alarm set    ASSESSMENT   Pt demonstrated decreased assistance required with  functional mobility today. Unable to assess stairs due to decreased aerobic endurance.  Plan to assess stairs to be a

## 2021-02-15 NOTE — SLP NOTE
ADULT SWALLOWING EVALUATION    ASSESSMENT    ASSESSMENT/OVERALL IMPRESSION:  Order received for bedside swallow evaluation to r/o aspiration. Pt presented to ER with c/o fever, chronic cough, falls, bilateral swelling and progressive weakness.  Recent proca from menu to avoid restrictions and continue current pill administration process. Reviewed and reiterated importance of aspiration precautions with good understanding.  Will continue to follow to assess tolerance with recommended diet and ensure appropriat significant interval change. SUBJECTIVE       OBJECTIVE   ORAL MOTOR EXAMINATION  Dentition: Upper dentures; Lower partials; Ill fitting(slightly loose)  Symmetry: Within Functional Limits  Strength:  Within Functional Limits  Tone: Within Functional Limits therapy; Aspiration precautions  Number of Visits to Meet Established Goals: (1-2)  Follow Up Needed: Yes  SLP Follow-up Date: 02/16/21     SLP donned goggles, surgical mask and gloves prior to assessing patient.  Pt unable to wear mask d/t nature of exam.

## 2021-02-15 NOTE — PROGRESS NOTES
Pt A&Ox4 Room Air  Resting in bed  NSR/ST on Tele  Abx Zosyn and Azithromycin (Pnemonia)  Sputum cx ordered still needs  PIV SL  Flu panel (-)  PT rec Home w/home PT  Isolation maintained R/O C-diff  Speech placed on Consult  Safety precaution maintained

## 2021-02-15 NOTE — OCCUPATIONAL THERAPY NOTE
OCCUPATIONAL THERAPY EVALUATION - INPATIENT     Room Number: 4561/5356-H  Evaluation Date: 2/15/2021  Type of Evaluation: Initial  Presenting Problem: acute afebrile illness    Physician Order: IP Consult to Occupational Therapy  Reason for Therapy: ADL/IA bed.     OBJECTIVE  Precautions: None  Fall Risk: High fall risk    WEIGHT BEARING RESTRICTION  Weight Bearing Restriction: None                PAIN ASSESSMENT  Ratin  Location: denies       COGNITION  Overall Cognitive Status:  WFL - within functional instructed transfers sit <> stand CGA from bed utilizing RW with verbal cues for hand placement on bed prior to standing for safety. Pt politely declined to attempt LB dressing this day stating that he gets assistance with that at home.     Pt left seate training;Functional transfer training;UE strengthening/ROM; Endurance training;Patient/Family education;Patient/Family training;Equipment eval/education; Neuromuscluar reeducation; Compensatory technique education;Continued evaluation  Rehab Potential : Good

## 2021-02-15 NOTE — CONSULTS
[facility]  Report of Inpatient Wound Care Consultation    Iker Irwin Patient Status:  Inpatient    3/26/1940 MRN [id number]   [facility] 3NE-A Attending Stanley Fleischer, MD   [address] Day # 2 PCP Landen Benjamin     Reason for Con bed is 100% slough. No drainage or malodor present. Periwound is clean, dry, and intact. Patient is reporting 8/10 pain. PLAN OF CARE:   Cleanse wounds with normal saline and gauze. Apply sacral border foam to wounds every 3 days or as needed.       Candy Alcala

## 2021-02-16 ENCOUNTER — PATIENT OUTREACH (OUTPATIENT)
Dept: CASE MANAGEMENT | Age: 81
End: 2021-02-16

## 2021-02-16 DIAGNOSIS — Z02.9 ENCOUNTERS FOR ADMINISTRATIVE PURPOSE: ICD-10-CM

## 2021-02-16 PROCEDURE — 1111F DSCHRG MED/CURRENT MED MERGE: CPT

## 2021-02-16 NOTE — PROGRESS NOTES
NURSING DISCHARGE NOTE    Discharged Home via Wheelchair. Accompanied by Support staff  Belongings Taken by patient/family. Went over discharge instructions and follow up appointments with daughter via phone. Discharge paperwork given to patient.  Darcie Morgan

## 2021-02-16 NOTE — PROGRESS NOTES
Initial Post Discharge Follow Up   Discharge Date: 2/15/21  Contact Date: 2/16/2021    Consent Verification:  Assessment Completed With: Other: daughter Tori Hopson received per patient?  verbal  HIPAA Verified?   Yes    Discharge Dx:   Acute febr MG Oral Tab Take 81 mg by mouth daily. • cyanocobalamin 1000 MCG Oral Tab Take 1,000 mcg by mouth daily. • benzonatate 100 MG Oral Cap Take 1 capsule (100 mg total) by mouth 3 (three) times daily as needed for cough.  20 capsule 0   • metoprolol Tar ([address], [place])    For the safety of our patients, visitors and care team, we are asking patients not to bring anyone with them to their appointment, unless clinically required.         Feb 19, 2021 11:20 AM CST New Patient Office Visit that the diarrhea has resolved. She denies that he has any fever, n/v/c/d or any new or worsening symptoms. She states that the [facility] RN checks his blood pressure and she is coming tomorrow.  She states that the ulcers on his right and left buttock do cause hi

## 2021-02-19 ENCOUNTER — OFFICE VISIT (OUTPATIENT)
Dept: INTERNAL MEDICINE CLINIC | Facility: CLINIC | Age: 81
End: 2021-02-19
Payer: MEDICARE

## 2021-02-19 VITALS
HEIGHT: 61 IN | HEART RATE: 117 BPM | DIASTOLIC BLOOD PRESSURE: 58 MMHG | OXYGEN SATURATION: 97 % | TEMPERATURE: 98 F | WEIGHT: 141 LBS | BODY MASS INDEX: 26.62 KG/M2 | RESPIRATION RATE: 20 BRPM | SYSTOLIC BLOOD PRESSURE: 92 MMHG

## 2021-02-19 DIAGNOSIS — L89.159 PRESSURE INJURY OF SKIN OF SACRAL REGION, UNSPECIFIED INJURY STAGE: ICD-10-CM

## 2021-02-19 DIAGNOSIS — R53.1 WEAKNESS: ICD-10-CM

## 2021-02-19 DIAGNOSIS — R26.81 GAIT INSTABILITY: ICD-10-CM

## 2021-02-19 DIAGNOSIS — K21.00 GASTROESOPHAGEAL REFLUX DISEASE WITH ESOPHAGITIS WITHOUT HEMORRHAGE: Chronic | ICD-10-CM

## 2021-02-19 DIAGNOSIS — F32.0 CURRENT MILD EPISODE OF MAJOR DEPRESSIVE DISORDER WITHOUT PRIOR EPISODE (HCC): ICD-10-CM

## 2021-02-19 DIAGNOSIS — J18.9 PNEUMONIA OF BOTH LOWER LOBES DUE TO INFECTIOUS ORGANISM: Primary | ICD-10-CM

## 2021-02-19 DIAGNOSIS — Z86.16 HISTORY OF 2019 NOVEL CORONAVIRUS DISEASE (COVID-19): ICD-10-CM

## 2021-02-19 DIAGNOSIS — S31.000A WOUND OF SACRAL REGION, INITIAL ENCOUNTER: ICD-10-CM

## 2021-02-19 PROBLEM — F32.9 CURRENT EPISODE OF MAJOR DEPRESSIVE DISORDER WITHOUT PRIOR EPISODE: Status: ACTIVE | Noted: 2021-02-19

## 2021-02-19 PROCEDURE — 99495 TRANSJ CARE MGMT MOD F2F 14D: CPT | Performed by: NURSE PRACTITIONER

## 2021-02-19 PROCEDURE — 1111F DSCHRG MED/CURRENT MED MERGE: CPT | Performed by: NURSE PRACTITIONER

## 2021-02-19 RX ORDER — ZINC SULFATE 50(220)MG
220 CAPSULE ORAL DAILY
Qty: 14 CAPSULE | COMMUNITY
Start: 2021-02-19

## 2021-02-19 RX ORDER — MULTIVIT-MIN/IRON/FOLIC ACID/K 18-600-40
500 CAPSULE ORAL 2 TIMES DAILY
Qty: 28 CAPSULE | Refills: 0 | COMMUNITY
Start: 2021-02-19

## 2021-02-19 RX ORDER — ESCITALOPRAM OXALATE 5 MG/1
5 TABLET ORAL DAILY
Qty: 30 TABLET | Refills: 0 | Status: SHIPPED | OUTPATIENT
Start: 2021-02-19 | End: 2021-03-26

## 2021-02-19 RX ORDER — AMOXICILLIN AND CLAVULANATE POTASSIUM 875; 125 MG/1; MG/1
1 TABLET, FILM COATED ORAL 2 TIMES DAILY
Qty: 12 TABLET | Refills: 0 | Status: SHIPPED | OUTPATIENT
Start: 2021-02-19 | End: 2021-02-25

## 2021-02-19 NOTE — PROGRESS NOTES
[facility]ITION[facility]ARE CLINIC PHARMACIST MEDICATION RECONCILIATION        Norm Marie MRN [id number]    3/26/1940 PCP Sugey Weaver       Comments: Medication history completed in [address] [place] [address] by pharmacist with patient, spouse and ani monitoring parameters, and potential side effects of medications. Patient confirmed understanding.      Thank you,    Donnell Rojas, PharmD, 2/19/2021, 9:00 AM  [facility]

## 2021-02-19 NOTE — PROGRESS NOTES
[address]  TRANSITIONAL CARE CLINIC      HISTORY   CHIEF COMPLAINT: PNA/Abnormal Chest CT/GERD/Nausea/Sacral wound/Depression  HPI: Rajendra Lockett is a 79year old male here today for hospital follow up for PNA/Abnormal Chest CT/ mg by mouth daily. , Disp: , Rfl:     •  cyanocobalamin 1000 MCG Oral Tab, Take 1,000 mcg by mouth daily. , Disp: , Rfl:     •  benzonatate 100 MG Oral Cap, Take 1 capsule (100 mg total) by mouth 3 (three) times daily as needed for cough. , Disp: 20 capsule, Dawn Goodpasture, MD on 2/14/2021 at 1:15 PM       Xr Abdomen (1 View) (cpt=74018)    Result Date: 2/13/2021  CONCLUSION:  Mildly prominent loops of small bowel are present with some fecal material noted within the colon.   The findings are somewhat nonspecif followup at 18-24 months. In high-risk patients, a followup CT at 6-12 months and then 18-24 months is suggested. For nodules measuring greater than 8 mm, a followup CT at 3 months, PET/CT, or biopsy is recommended in all patients.     Dictated by (CST): Gonzalez Jarrell constipation, diarrhea, dysphagia, heartburn, hemorrhoids, nausea, +posttussive emesis  MUSCULOSKELETAL: denies pain, numbness or tingling of extremeties, states normal range of motion of extremities  NEUROLOGIC: denies confusion, balance difficulty, +dizz wks  · F/U w/ Wound clinic 2.23.21    4. Depression  · ADD Lexapro 5 mg daily  · F/U w/ PCP for additional titration    5.  Weakness/Gait instability/Fatigues easily  · Lightweight wheelchair w/ seat cushion  · RW  · Home health    No orders of the defined (two) times daily for 6 days. Take for an additional 6 days to total 10 days. • escitalopram (LEXAPRO) 5 MG Oral Tab 30 tablet 0     Sig: Take 1 tablet (5 mg total) by mouth daily. • Misc.  Devices (CLASSICS ROLLING WALKER) Does not apply Misc 1 each 0 Risk:   moderate    Patient seen within 7 days from date of discharge.     Discharge Disposition/Plan: Future Appointments   Date Time Provider Luis Toro   2/19/2021 10:40 AM SP PULM TECH SP PULM [facility]   2/19/2021 11:20 AM Misa Aguirre IV

## 2021-02-19 NOTE — PATIENT INSTRUCTIONS
Patient instructions:  · Take Augementin (antibiotic) until all gone, we are sending RX to pharmacy to provide remaining number of pills to complete a 10 days of treatment  · Start Lexapro 5 mg daily for depression  · Start Vitamin C 500 mg 2x daily x 2 wk

## 2021-02-23 ENCOUNTER — TELEPHONE (OUTPATIENT)
Dept: CASE MANAGEMENT | Age: 81
End: 2021-02-23

## 2021-02-23 ENCOUNTER — OFFICE VISIT (OUTPATIENT)
Dept: WOUND CARE | Facility: HOSPITAL | Age: 81
End: 2021-02-23
Attending: NURSE PRACTITIONER
Payer: MEDICARE

## 2021-02-23 DIAGNOSIS — L89.320 PRESSURE ULCER OF LEFT BUTTOCK, UNSTAGEABLE (HCC): Primary | ICD-10-CM

## 2021-02-23 DIAGNOSIS — R63.4 ABNORMAL WEIGHT LOSS: ICD-10-CM

## 2021-02-23 DIAGNOSIS — R77.0 ABNORMALITY OF ALBUMIN: ICD-10-CM

## 2021-02-23 DIAGNOSIS — L89.310: ICD-10-CM

## 2021-02-23 DIAGNOSIS — Z79.52 LONG TERM CURRENT USE OF SYSTEMIC STEROIDS: ICD-10-CM

## 2021-02-23 PROCEDURE — 99214 OFFICE O/P EST MOD 30 MIN: CPT

## 2021-02-23 NOTE — PROGRESS NOTES
Subjective    Chief Complaint  This information was obtained from the patient  Pt is here for an initial assessment for a buttocks wound that began about a month ago pt states it began when he fell. States he has been falling a lot.  Complains of a 7/10 bala Tuberculosis - No History, Seizures - No History, Autoimmune Disease - No History, Other - No History    Social History  This information was obtained from the patient  Never smoker, Caffeine Use - coffee per day , Alcohol Use - none, Marital Status - hollie Hyperpigmentation  Neurological: Tremors, Loss of Protective Sensation  Psychiatric: Claustrophobia, Nervousness / Tension, Memory Loss    Additional Information  History of chemotherapy?: No  History of radiation?: No    Medications  atorvastatin - oral 2 is well defined. Wound bed has 1-25% epithelialization, 51-75% slough. The periwound skin exhibited: Dry/Scaly, Erythema. The periwound skin did not exhibit: Induration. The temperature of the periwound skin is WNL.  Periwound skin does not exhibit signs o Dressing 3x/week - with your shower    Off-Loading:  EHOB cushion - can get \"medline\" brand or any other brand    Follow-Up Appointments:  Other: - 1.5 weeks    Misc/Additional Orders:  [facility] Nurse for Wound Care - residential 3 x weekly: PARESH

## 2021-02-23 NOTE — TELEPHONE ENCOUNTER
Tom Smith from [facility] called to inquire about a potential interaction between Escitalopram and Hydroxychloroquine.   Stated he noticed the patient had started the Escitalopram and when he ran it through his documentation he came up with a level 1 interaction with

## 2021-02-26 ENCOUNTER — OFFICE VISIT (OUTPATIENT)
Dept: FAMILY MEDICINE CLINIC | Facility: CLINIC | Age: 81
End: 2021-02-26
Payer: MEDICARE

## 2021-02-26 VITALS
TEMPERATURE: 97 F | DIASTOLIC BLOOD PRESSURE: 68 MMHG | WEIGHT: 140 LBS | BODY MASS INDEX: 26.43 KG/M2 | SYSTOLIC BLOOD PRESSURE: 111 MMHG | HEART RATE: 107 BPM | HEIGHT: 61 IN

## 2021-02-26 DIAGNOSIS — M06.9 RHEUMATOID ARTHRITIS INVOLVING MULTIPLE SITES, UNSPECIFIED WHETHER RHEUMATOID FACTOR PRESENT (HCC): ICD-10-CM

## 2021-02-26 DIAGNOSIS — E03.9 ACQUIRED HYPOTHYROIDISM: ICD-10-CM

## 2021-02-26 DIAGNOSIS — I25.10 CORONARY ARTERY DISEASE INVOLVING NATIVE CORONARY ARTERY OF NATIVE HEART WITHOUT ANGINA PECTORIS: ICD-10-CM

## 2021-02-26 DIAGNOSIS — R06.02 SHORTNESS OF BREATH: Primary | ICD-10-CM

## 2021-02-26 DIAGNOSIS — R05.3 CHRONIC COUGH: ICD-10-CM

## 2021-02-26 PROCEDURE — 99203 OFFICE O/P NEW LOW 30 MIN: CPT | Performed by: FAMILY MEDICINE

## 2021-02-26 PROCEDURE — 1111F DSCHRG MED/CURRENT MED MERGE: CPT | Performed by: FAMILY MEDICINE

## 2021-02-26 NOTE — PROGRESS NOTES
[facility] Office Note  Chief Complaint:   Patient presents with: Other: NP.  here to establish care. history of covid in October and has been in hospital 3 times since.       HPI:   This is a 79year old male coming in to [facility] [place] quit smoking about 6 yrs ago    Alcohol use:  Yes      Alcohol/week: 2.0 standard drinks      Types: 2 Glasses of wine per week      Frequency: Monthly or less      Drinks per session: 1 or 2    Drug use: Never    Family History:  Family History   Problem R route as needed. 1 each 0      Counseling given: Not Answered  Comment: quit smoking about 6 yrs ago       REVIEW OF SYSTEMS:   Review of Systems   Constitutional: Negative. HENT: Negative. Eyes: Negative.     Respiratory: Positive for cough (chronic, zosyn/azithromycin inpatient, augmentin outpatient, and steroid taper. Symptoms improving - advised to finish steroid course as directed. -Follow up with pulmonology.      2. Rheumatoid arthritis involving multiple sites, unspecified whether rheumatoid fa infectious organism     History of 2019 novel coronavirus disease (COVID-19)     Essential hypertension     Acute febrile illness     Elevated lactic acid level     Pressure injury of skin of sacral region, unspecified injury stage     Falling episodes

## 2021-03-05 ENCOUNTER — TELEPHONE (OUTPATIENT)
Dept: FAMILY MEDICINE CLINIC | Facility: CLINIC | Age: 81
End: 2021-03-05

## 2021-03-05 ENCOUNTER — HOSPITAL ENCOUNTER (OUTPATIENT)
Dept: ULTRASOUND IMAGING | Age: 81
Discharge: HOME | End: 2021-03-05
Attending: FAMILY MEDICINE
Payer: MEDICARE

## 2021-03-05 ENCOUNTER — LAB ENCOUNTER (OUTPATIENT)
Dept: LAB | Age: 81
End: 2021-03-05
Attending: FAMILY MEDICINE
Payer: MEDICARE

## 2021-03-05 DIAGNOSIS — E03.9 ACQUIRED HYPOTHYROIDISM: ICD-10-CM

## 2021-03-05 DIAGNOSIS — I25.10 CORONARY ARTERY DISEASE INVOLVING NATIVE CORONARY ARTERY OF NATIVE HEART WITHOUT ANGINA PECTORIS: ICD-10-CM

## 2021-03-05 LAB
CHOLEST SMN-MCNC: 126 MG/DL (ref ?–200)
HDLC SERPL-MCNC: 57 MG/DL (ref 40–59)
LDLC SERPL CALC-MCNC: 56 MG/DL (ref ?–100)
NONHDLC SERPL-MCNC: 69 MG/DL (ref ?–130)
PATIENT FASTING Y/N/NP: YES
T4 FREE SERPL-MCNC: 1 NG/DL (ref 0.8–1.7)
TRIGL SERPL-MCNC: 63 MG/DL (ref 30–149)
TSI SER-ACNC: 4.79 MIU/ML (ref 0.36–3.74)
VLDLC SERPL CALC-MCNC: 13 MG/DL (ref 0–30)

## 2021-03-05 PROCEDURE — 36415 COLL VENOUS BLD VENIPUNCTURE: CPT

## 2021-03-05 PROCEDURE — 76536 US EXAM OF HEAD AND NECK: CPT | Performed by: FAMILY MEDICINE

## 2021-03-05 PROCEDURE — 84443 ASSAY THYROID STIM HORMONE: CPT

## 2021-03-05 PROCEDURE — 84439 ASSAY OF FREE THYROXINE: CPT

## 2021-03-05 PROCEDURE — 80061 LIPID PANEL: CPT

## 2021-03-05 NOTE — TELEPHONE ENCOUNTER
----- Message from Serge Alfonso MD sent at 3/5/2021  3:04 PM CST -----  Lipid panel looks great. TSH within normal limits for his age.

## 2021-03-05 NOTE — TELEPHONE ENCOUNTER
Called patient and left message per HIPAA of lab results. Advised patient to contact the office with any questions. Office number provided.

## 2021-03-08 ENCOUNTER — TELEPHONE (OUTPATIENT)
Dept: FAMILY MEDICINE CLINIC | Facility: CLINIC | Age: 81
End: 2021-03-08

## 2021-03-08 NOTE — TELEPHONE ENCOUNTER
Called [facility] health, left  for [facility] to obtain more information. Office number provided and awaiting call back.

## 2021-03-08 NOTE — TELEPHONE ENCOUNTER
Home health called to confirm Dr. Agustin Flor will be signing off on orders please call back to inform [phone]    Also as a FYI he sees patient 3 times a week for dressing changes. But this past Friday the family changed it for him.

## 2021-03-09 ENCOUNTER — APPOINTMENT (OUTPATIENT)
Dept: WOUND CARE | Facility: HOSPITAL | Age: 81
End: 2021-03-09
Attending: NURSE PRACTITIONER
Payer: MEDICARE

## 2021-03-26 ENCOUNTER — OFFICE VISIT (OUTPATIENT)
Dept: FAMILY MEDICINE CLINIC | Facility: CLINIC | Age: 81
End: 2021-03-26
Payer: MEDICARE

## 2021-03-26 VITALS
SYSTOLIC BLOOD PRESSURE: 104 MMHG | DIASTOLIC BLOOD PRESSURE: 62 MMHG | WEIGHT: 143.25 LBS | OXYGEN SATURATION: 95 % | HEART RATE: 101 BPM | RESPIRATION RATE: 16 BRPM | BODY MASS INDEX: 26.7 KG/M2 | TEMPERATURE: 98 F | HEIGHT: 61.5 IN

## 2021-03-26 DIAGNOSIS — M06.9 RHEUMATOID ARTHRITIS INVOLVING MULTIPLE SITES, UNSPECIFIED WHETHER RHEUMATOID FACTOR PRESENT (HCC): ICD-10-CM

## 2021-03-26 DIAGNOSIS — Z86.16 HISTORY OF 2019 NOVEL CORONAVIRUS DISEASE (COVID-19): ICD-10-CM

## 2021-03-26 DIAGNOSIS — R53.81 DEBILITY: ICD-10-CM

## 2021-03-26 DIAGNOSIS — Z00.00 ENCOUNTER FOR ANNUAL HEALTH EXAMINATION: Primary | ICD-10-CM

## 2021-03-26 DIAGNOSIS — I10 ESSENTIAL HYPERTENSION: ICD-10-CM

## 2021-03-26 DIAGNOSIS — F32.0 CURRENT MILD EPISODE OF MAJOR DEPRESSIVE DISORDER WITHOUT PRIOR EPISODE (HCC): ICD-10-CM

## 2021-03-26 DIAGNOSIS — Z86.69 HISTORY OF HEARING LOSS: ICD-10-CM

## 2021-03-26 DIAGNOSIS — K21.00 GASTROESOPHAGEAL REFLUX DISEASE WITH ESOPHAGITIS WITHOUT HEMORRHAGE: Chronic | ICD-10-CM

## 2021-03-26 DIAGNOSIS — I25.10 CORONARY ARTERY DISEASE INVOLVING NATIVE CORONARY ARTERY OF NATIVE HEART WITHOUT ANGINA PECTORIS: ICD-10-CM

## 2021-03-26 PROCEDURE — G0439 PPPS, SUBSEQ VISIT: HCPCS | Performed by: FAMILY MEDICINE

## 2021-03-26 NOTE — PATIENT INSTRUCTIONS
1. Follow up with ENT for audiology exam.       Meghan Howell's SCREENING SCHEDULE   Tests on this list are recommended by your physician but may not be covered, or covered at this frequency, by your insurer.  Please check with your insurance fournier history    Colorectal Cancer Screening Covered up to Age 76     Colonoscopy Screen   Covered every 10 years- more often if abnormal There are no preventive care reminders to display for this patient.  Update Health Maintenance if applicable    Flex Sigmoido with metal- TD and TDaP Not covered by Medicare Part B) No orders found for this or any previous visit.  This may be covered with your prescription benefits, but Medicare does not cover unless Medically needed    Zoster (Not covered by Medicare Part B) No o

## 2021-03-26 NOTE — PROGRESS NOTES
HPI:   Eli Rodriguez is a [de-identified]year old male who presents for a Medicare Subsequent Annual Wellness visit (Pt already had Initial Annual Wellness)    COVID19/PNA/IPF/SOB - feels much better and symptoms resolved.  Has been started on daily prednison money/bills: Need some help  He has difficulties Shopping for Groceries based on screening of functional status. Shop for groceries: Need some help   He has difficulties Taking Meds as Rx'd based on screening of functional status.    Taking medications as tissue disease     Back pain     TB (pulmonary tuberculosis)     Encounter for long-term (current) use of other medications     HTN (hypertension)     COVID-19     Hypoxia     Gastroesophageal reflux disease with esophagitis without hemorrhage     Coronary lungs every 6 (six) hours as needed for Wheezing or Shortness of Breath. celecoxib 200 MG Oral Cap, Take 200 mg by mouth 2 (two) times daily as needed for Pain. Cholecalciferol (VITAMIN D) 50 MCG (2000 UT) Oral Tab, Take 2,000 Units by mouth daily.   meto from the following:    Height as of this encounter: 5' 1.5\" (1.562 m). Weight as of this encounter: 143 lb 4 oz (65 kg).     Medicare Hearing Assessment  (Required for AWV/SWV)    Wears hearing aids        Visual Acuity    follows with optho UTD   -Cancer screening: UTD  -Other screening: will refer to ENT for hearing aids.   -Communicable disease: low risk   -Mental health: see below   -Other preventative: follow with dentistry and optometry.   -Lifestyle:  Follow a well balanced healthy diet does the patient maintain a good energy level?: Daily Walks  How would you describe your current health state?: Good  How do you maintain positive mental well-being?: Social Interaction    This section provided for quick review of chart, separate sheet to End-stage renal disease   Hemophiliacs who received Factor VIII or IX concentrates   Clients of institutions for the mentally retarded   Persons who live in the same house as a HepB virus carrier   Homosexual men   Illicit injectable drug abusers     Tet

## 2021-03-29 PROBLEM — R09.02 HYPOXIA: Status: RESOLVED | Noted: 2020-11-05 | Resolved: 2021-03-29

## 2021-03-29 PROBLEM — U07.1 COVID-19: Status: RESOLVED | Noted: 2020-11-05 | Resolved: 2021-03-29

## 2021-03-29 PROBLEM — R60.0 PEDAL EDEMA: Status: RESOLVED | Noted: 2021-02-13 | Resolved: 2021-03-29

## 2021-03-29 PROBLEM — R50.9 ACUTE FEBRILE ILLNESS: Status: RESOLVED | Noted: 2021-02-13 | Resolved: 2021-03-29

## 2021-03-29 PROBLEM — Z86.16 HISTORY OF 2019 NOVEL CORONAVIRUS DISEASE (COVID-19): Status: RESOLVED | Noted: 2020-12-15 | Resolved: 2021-03-29

## 2021-03-29 PROBLEM — J18.9 PNEUMONIA OF BOTH LOWER LOBES DUE TO INFECTIOUS ORGANISM: Status: RESOLVED | Noted: 2020-12-15 | Resolved: 2021-03-29

## 2021-03-29 PROBLEM — L89.159 PRESSURE INJURY OF SKIN OF SACRAL REGION, UNSPECIFIED INJURY STAGE: Status: RESOLVED | Noted: 2021-02-13 | Resolved: 2021-03-29

## 2021-03-29 PROBLEM — R79.89 ELEVATED LACTIC ACID LEVEL: Status: RESOLVED | Noted: 2021-02-13 | Resolved: 2021-03-29

## 2021-04-07 ENCOUNTER — TELEPHONE (OUTPATIENT)
Dept: FAMILY MEDICINE CLINIC | Facility: CLINIC | Age: 81
End: 2021-04-07

## 2021-04-07 NOTE — TELEPHONE ENCOUNTER
Received call from LILLY Cooper with [facility] who states he is looking for verbal \"OK\" / order to discharge from home health next week, Wednesday.      RN states patient is doing well, his sacral wound is healed and he states patient no longer need

## 2021-04-07 NOTE — TELEPHONE ENCOUNTER
Notified RN of PCP response below. RN verbalizes understanding and states that paperwork will be faxed to the office this afternoon or tomorrow.

## 2021-05-11 ENCOUNTER — PATIENT OUTREACH (OUTPATIENT)
Dept: CASE MANAGEMENT | Age: 81
End: 2021-05-11

## 2021-07-27 NOTE — TELEPHONE ENCOUNTER
Last office visit:  03/26/21      Requested medication:   metoprolol Tartrate (LOPRESSOR) tab 25 mg      Pharmacy:  [place] [address] [phone], [phone]    PT is out of medication.

## 2021-08-11 ENCOUNTER — TELEPHONE (OUTPATIENT)
Dept: FAMILY MEDICINE CLINIC | Facility: CLINIC | Age: 81
End: 2021-08-11

## 2021-08-11 DIAGNOSIS — E78.2 MIXED HYPERLIPIDEMIA: Primary | ICD-10-CM

## 2021-08-11 RX ORDER — ROSUVASTATIN CALCIUM 5 MG/1
5 TABLET, COATED ORAL NIGHTLY
Qty: 90 TABLET | Refills: 0 | Status: SHIPPED | OUTPATIENT
Start: 2021-08-11 | End: 2021-11-08

## 2021-08-11 NOTE — TELEPHONE ENCOUNTER
Pt requesting switch to crestor as he took this medication in the past. Feels reflux with atorvastatin. Will send in RX.

## 2021-09-23 ENCOUNTER — TELEPHONE (OUTPATIENT)
Dept: FAMILY MEDICINE CLINIC | Facility: CLINIC | Age: 81
End: 2021-09-23

## 2021-09-23 ENCOUNTER — OFFICE VISIT (OUTPATIENT)
Dept: FAMILY MEDICINE CLINIC | Facility: CLINIC | Age: 81
End: 2021-09-23
Payer: MEDICARE

## 2021-09-23 ENCOUNTER — HOSPITAL ENCOUNTER (OUTPATIENT)
Dept: GENERAL RADIOLOGY | Age: 81
Discharge: HOME | End: 2021-09-23
Attending: FAMILY MEDICINE
Payer: MEDICARE

## 2021-09-23 VITALS
OXYGEN SATURATION: 97 % | DIASTOLIC BLOOD PRESSURE: 62 MMHG | HEIGHT: 61 IN | RESPIRATION RATE: 16 BRPM | BODY MASS INDEX: 27 KG/M2 | HEART RATE: 91 BPM | SYSTOLIC BLOOD PRESSURE: 104 MMHG | WEIGHT: 143 LBS

## 2021-09-23 DIAGNOSIS — R05.9 COUGH: Primary | ICD-10-CM

## 2021-09-23 DIAGNOSIS — M06.9 RHEUMATOID ARTHRITIS INVOLVING MULTIPLE SITES, UNSPECIFIED WHETHER RHEUMATOID FACTOR PRESENT (HCC): ICD-10-CM

## 2021-09-23 DIAGNOSIS — I10 ESSENTIAL HYPERTENSION: ICD-10-CM

## 2021-09-23 DIAGNOSIS — K21.00 GASTROESOPHAGEAL REFLUX DISEASE WITH ESOPHAGITIS WITHOUT HEMORRHAGE: Chronic | ICD-10-CM

## 2021-09-23 DIAGNOSIS — I25.10 CORONARY ARTERY DISEASE INVOLVING NATIVE CORONARY ARTERY OF NATIVE HEART WITHOUT ANGINA PECTORIS: ICD-10-CM

## 2021-09-23 DIAGNOSIS — R05.9 COUGH: ICD-10-CM

## 2021-09-23 PROBLEM — F32.9 CURRENT EPISODE OF MAJOR DEPRESSIVE DISORDER WITHOUT PRIOR EPISODE: Status: RESOLVED | Noted: 2021-02-19 | Resolved: 2021-09-23

## 2021-09-23 PROCEDURE — 71046 X-RAY EXAM CHEST 2 VIEWS: CPT | Performed by: FAMILY MEDICINE

## 2021-09-23 PROCEDURE — 99214 OFFICE O/P EST MOD 30 MIN: CPT | Performed by: FAMILY MEDICINE

## 2021-09-23 RX ORDER — LEFLUNOMIDE 20 MG/1
20 TABLET ORAL DAILY
COMMUNITY
Start: 2021-08-30

## 2021-09-23 NOTE — PATIENT INSTRUCTIONS
1. Start zyrtec (without sudafed) daily as needed for itching. Also can use topical hydrocortise as needed.       2. Complete chest xray

## 2021-09-23 NOTE — TELEPHONE ENCOUNTER
Notified the patient's daughter of the below CXR results and recommendation. Patient's daughter verbalized understanding. States that patient will keep appointment with pulmonologist on 10/1. Answered all questions at this time.

## 2021-09-23 NOTE — TELEPHONE ENCOUNTER
----- Message from Linda Marx MD sent at 9/23/2021  4:41 PM CDT -----  No new changes on CXR from previous in 02/2021; follow up with pulmonology as previously directed next month for follow up appointment.

## 2021-09-23 NOTE — PROGRESS NOTES
[facility] Family Medicine Office Note  Chief Complaint:   Patient presents with: Follow - Up  Cough: Pt here had a dry cough with some mucus in stools.  Per daughter Pt has fevers with reading 101s       HPI:   This is a [de-identified]year old male coming daily. 90 tablet 0   • predniSONE 5 MG Oral Tab Take 5 mg by mouth daily. • Misc. Devices (CLASSICS ROLLING WALKER) Does not apply Misc 1 Device by Does not apply route as needed.  1 each 0   • Albuterol Sulfate  (90 Base) MCG/ACT Inhalation Aero encounter: 143 lb (64.9 kg). Vital signs reviewed. Appears stated age, well groomed. Physical Exam  Vitals and nursing note reviewed. Constitutional:       Appearance: Normal appearance. HENT:      Head: Normocephalic and atraumatic.       Nose: Nose Patient/Caregiver Education: There are no barriers to learning. Medical education done. Outcome: Patient verbalizes understanding. Patient is notified to call with any questions, complications, allergies, or worsening or changing symptoms.   Patient is to

## 2021-10-04 DIAGNOSIS — E78.2 MIXED HYPERLIPIDEMIA: ICD-10-CM

## 2021-10-04 RX ORDER — ROSUVASTATIN CALCIUM 5 MG/1
TABLET, COATED ORAL
Qty: 90 TABLET | Refills: 0 | OUTPATIENT
Start: 2021-10-04

## 2021-11-07 DIAGNOSIS — E78.2 MIXED HYPERLIPIDEMIA: ICD-10-CM

## 2021-11-08 RX ORDER — ROSUVASTATIN CALCIUM 5 MG/1
TABLET, COATED ORAL
Qty: 90 TABLET | Refills: 0 | Status: SHIPPED | OUTPATIENT
Start: 2021-11-08

## 2021-11-15 ENCOUNTER — HOSPITAL ENCOUNTER (OUTPATIENT)
Dept: CT IMAGING | Age: 81
Discharge: HOME | End: 2021-11-15
Attending: INTERNAL MEDICINE
Payer: MEDICARE

## 2021-11-15 DIAGNOSIS — J84.9 ILD (INTERSTITIAL LUNG DISEASE) (HCC): ICD-10-CM

## 2021-11-15 DIAGNOSIS — R05.9 COUGH: ICD-10-CM

## 2021-11-15 DIAGNOSIS — R06.00 DYSPNEA ON EXERTION: ICD-10-CM

## 2021-11-15 PROCEDURE — 71250 CT THORAX DX C-: CPT | Performed by: INTERNAL MEDICINE

## 2021-11-18 DIAGNOSIS — E78.2 MIXED HYPERLIPIDEMIA: ICD-10-CM

## 2021-11-18 RX ORDER — ROSUVASTATIN CALCIUM 5 MG/1
TABLET, COATED ORAL
Qty: 90 TABLET | Refills: 0 | OUTPATIENT
Start: 2021-11-18

## 2022-02-17 RX ORDER — ROSUVASTATIN CALCIUM 5 MG/1
TABLET, COATED ORAL
Qty: 30 TABLET | Refills: 0 | Status: SHIPPED | OUTPATIENT
Start: 2022-02-17 | End: 2022-04-04

## 2022-03-01 ENCOUNTER — TELEPHONE (OUTPATIENT)
Dept: FAMILY MEDICINE CLINIC | Facility: CLINIC | Age: 82
End: 2022-03-01

## 2022-03-01 NOTE — TELEPHONE ENCOUNTER
Pt is needing referral to a cardiovascular doctor, Pt had someone in [place] but since he moved he would like to see someone closer to him, please advise.

## 2022-03-01 NOTE — TELEPHONE ENCOUNTER
Spoke to Jhonypriscila Rony, his daughter, gave her the contact information for Dr Juana Lyle. Also, told her if his old cardiologist has someone specific in mind, just call us back and we will change the referral for him.

## 2022-03-02 ENCOUNTER — TELEPHONE (OUTPATIENT)
Dept: FAMILY MEDICINE CLINIC | Facility: CLINIC | Age: 82
End: 2022-03-02

## 2022-03-03 NOTE — TELEPHONE ENCOUNTER
Consult report dated 3/2/2022 was received from Dr Esperanza Martin via fax. Report placed in Dr. Miguel Ángel Paul to be reviewed.

## 2022-03-07 ENCOUNTER — MED REC SCAN ONLY (OUTPATIENT)
Dept: FAMILY MEDICINE CLINIC | Facility: CLINIC | Age: 82
End: 2022-03-07

## 2022-04-04 RX ORDER — ROSUVASTATIN CALCIUM 5 MG/1
TABLET, COATED ORAL
Qty: 30 TABLET | Refills: 0 | Status: SHIPPED | OUTPATIENT
Start: 2022-04-04

## 2022-04-21 ENCOUNTER — TELEPHONE (OUTPATIENT)
Dept: FAMILY MEDICINE CLINIC | Facility: CLINIC | Age: 82
End: 2022-04-21

## 2022-04-21 ENCOUNTER — OFFICE VISIT (OUTPATIENT)
Dept: FAMILY MEDICINE CLINIC | Facility: CLINIC | Age: 82
End: 2022-04-21
Payer: MEDICARE

## 2022-04-21 VITALS
WEIGHT: 145 LBS | DIASTOLIC BLOOD PRESSURE: 62 MMHG | RESPIRATION RATE: 18 BRPM | HEART RATE: 74 BPM | OXYGEN SATURATION: 95 % | BODY MASS INDEX: 27.38 KG/M2 | SYSTOLIC BLOOD PRESSURE: 118 MMHG | HEIGHT: 61 IN

## 2022-04-21 DIAGNOSIS — K21.00 GASTROESOPHAGEAL REFLUX DISEASE WITH ESOPHAGITIS WITHOUT HEMORRHAGE: Chronic | ICD-10-CM

## 2022-04-21 DIAGNOSIS — I10 ESSENTIAL HYPERTENSION: ICD-10-CM

## 2022-04-21 DIAGNOSIS — J34.9 SINUS DISEASE: ICD-10-CM

## 2022-04-21 DIAGNOSIS — Z00.00 ENCOUNTER FOR ANNUAL HEALTH EXAMINATION: Primary | ICD-10-CM

## 2022-04-21 DIAGNOSIS — I25.10 CORONARY ARTERY DISEASE INVOLVING NATIVE CORONARY ARTERY OF NATIVE HEART WITHOUT ANGINA PECTORIS: ICD-10-CM

## 2022-04-21 DIAGNOSIS — M06.9 RHEUMATOID ARTHRITIS INVOLVING MULTIPLE SITES, UNSPECIFIED WHETHER RHEUMATOID FACTOR PRESENT (HCC): ICD-10-CM

## 2022-04-21 DIAGNOSIS — Z00.00 LABORATORY EXAMINATION ORDERED AS PART OF A ROUTINE GENERAL MEDICAL EXAMINATION: ICD-10-CM

## 2022-04-21 PROCEDURE — G0439 PPPS, SUBSEQ VISIT: HCPCS | Performed by: FAMILY MEDICINE

## 2022-04-21 RX ORDER — FLUTICASONE PROPIONATE 50 MCG
2 SPRAY, SUSPENSION (ML) NASAL DAILY
Qty: 1 EACH | Refills: 5 | Status: SHIPPED | OUTPATIENT
Start: 2022-04-21 | End: 2023-04-16

## 2022-04-21 RX ORDER — FOLIC ACID 1 MG/1
1 TABLET ORAL DAILY
COMMUNITY
Start: 2022-04-12

## 2022-04-21 RX ORDER — ACETAMINOPHEN 160 MG
TABLET,DISINTEGRATING ORAL
COMMUNITY
Start: 2020-12-21 | End: 2022-04-21

## 2022-04-21 RX ORDER — AZELASTINE HYDROCHLORIDE 137 UG/1
1 SPRAY, METERED NASAL 2 TIMES DAILY
Qty: 30 ML | Refills: 0 | Status: SHIPPED | OUTPATIENT
Start: 2022-04-21

## 2022-04-21 NOTE — TELEPHONE ENCOUNTER
Pt dropped of placard card. Okay to complete? Thank you. No form yet received in triage.    LOV: 04/21/22

## 2022-04-21 NOTE — TELEPHONE ENCOUNTER
Patient brought in [place] [id number] after his appt today, made copy and routed to triage     Patient asked that the form be mailed back to him

## 2022-04-22 NOTE — TELEPHONE ENCOUNTER
Placard card form completed. Called pt's daughter (on HIPAA) and was informed form completed. Daughter requesting for form to be mailed to pt's address. Pt's address confirmed. Daughter verbalized understanding. Form mailed. Copy sent to scan.

## 2022-04-28 ENCOUNTER — HOSPITAL ENCOUNTER (OUTPATIENT)
Dept: ULTRASOUND IMAGING | Age: 82
Discharge: HOME | End: 2022-04-28
Attending: INTERNAL MEDICINE
Payer: MEDICARE

## 2022-04-28 DIAGNOSIS — I65.23 BILATERAL CAROTID ARTERY STENOSIS: ICD-10-CM

## 2022-04-28 DIAGNOSIS — I25.10 CAD (CORONARY ARTERY DISEASE): ICD-10-CM

## 2022-04-28 PROCEDURE — 93880 EXTRACRANIAL BILAT STUDY: CPT | Performed by: INTERNAL MEDICINE

## 2022-05-05 ENCOUNTER — HOSPITAL ENCOUNTER (OUTPATIENT)
Dept: CV DIAGNOSTICS | Age: 82
Discharge: HOME | End: 2022-05-05
Attending: INTERNAL MEDICINE
Payer: MEDICARE

## 2022-05-05 DIAGNOSIS — I25.10 CAD (CORONARY ARTERY DISEASE): ICD-10-CM

## 2022-05-05 DIAGNOSIS — I65.23 BILATERAL CAROTID ARTERY STENOSIS: ICD-10-CM

## 2022-05-05 PROCEDURE — 93306 TTE W/DOPPLER COMPLETE: CPT | Performed by: INTERNAL MEDICINE

## 2022-05-21 ENCOUNTER — HOSPITAL ENCOUNTER (EMERGENCY)
Age: 82
Discharge: HOME | End: 2022-05-22
Attending: EMERGENCY MEDICINE
Payer: MEDICARE

## 2022-05-21 ENCOUNTER — APPOINTMENT (OUTPATIENT)
Dept: GENERAL RADIOLOGY | Age: 82
End: 2022-05-21
Attending: EMERGENCY MEDICINE
Payer: MEDICARE

## 2022-05-21 DIAGNOSIS — J06.9 VIRAL URI WITH COUGH: Primary | ICD-10-CM

## 2022-05-21 PROBLEM — J18.9 COMMUNITY ACQUIRED PNEUMONIA: Status: ACTIVE | Noted: 2022-05-21

## 2022-05-21 LAB
ALBUMIN SERPL-MCNC: 2.8 G/DL (ref 3.4–5)
ALBUMIN/GLOB SERPL: 0.6 {RATIO} (ref 1–2)
ALP LIVER SERPL-CCNC: 125 U/L
ALT SERPL-CCNC: 42 U/L
ANION GAP SERPL CALC-SCNC: 8 MMOL/L (ref 0–18)
APTT PPP: 28.5 SECONDS (ref 23.3–35.6)
AST SERPL-CCNC: 42 U/L (ref 15–37)
BASOPHILS # BLD AUTO: 0.09 X10(3) UL (ref 0–0.2)
BASOPHILS NFR BLD AUTO: 1.3 %
BILIRUB SERPL-MCNC: 0.3 MG/DL (ref 0.1–2)
BUN BLD-MCNC: 14 MG/DL (ref 7–18)
CALCIUM BLD-MCNC: 8.6 MG/DL (ref 8.5–10.1)
CHLORIDE SERPL-SCNC: 104 MMOL/L (ref 98–112)
CO2 SERPL-SCNC: 24 MMOL/L (ref 21–32)
CREAT BLD-MCNC: 0.77 MG/DL
EOSINOPHIL # BLD AUTO: 0.15 X10(3) UL (ref 0–0.7)
EOSINOPHIL NFR BLD AUTO: 2.2 %
ERYTHROCYTE [DISTWIDTH] IN BLOOD BY AUTOMATED COUNT: 16.9 %
GLOBULIN PLAS-MCNC: 4.9 G/DL (ref 2.8–4.4)
GLUCOSE BLD-MCNC: 110 MG/DL (ref 70–99)
HCT VFR BLD AUTO: 41.2 %
HGB BLD-MCNC: 12.9 G/DL
IMM GRANULOCYTES # BLD AUTO: 0.05 X10(3) UL (ref 0–1)
IMM GRANULOCYTES NFR BLD: 0.7 %
INR BLD: 1.14 (ref 0.8–1.2)
LACTATE SERPL-SCNC: 1.3 MMOL/L (ref 0.4–2)
LYMPHOCYTES # BLD AUTO: 1.56 X10(3) UL (ref 1–4)
LYMPHOCYTES NFR BLD AUTO: 23.2 %
MCH RBC QN AUTO: 27.2 PG (ref 26–34)
MCHC RBC AUTO-ENTMCNC: 31.3 G/DL (ref 31–37)
MCV RBC AUTO: 86.9 FL
MONOCYTES # BLD AUTO: 0.99 X10(3) UL (ref 0.1–1)
MONOCYTES NFR BLD AUTO: 14.7 %
NEUTROPHILS # BLD AUTO: 3.88 X10 (3) UL (ref 1.5–7.7)
NEUTROPHILS # BLD AUTO: 3.88 X10(3) UL (ref 1.5–7.7)
NEUTROPHILS NFR BLD AUTO: 57.9 %
NT-PROBNP SERPL-MCNC: 377 PG/ML (ref ?–450)
OSMOLALITY SERPL CALC.SUM OF ELEC: 283 MOSM/KG (ref 275–295)
PLATELET # BLD AUTO: 157 10(3)UL (ref 150–450)
POTASSIUM SERPL-SCNC: 3.6 MMOL/L (ref 3.5–5.1)
PROT SERPL-MCNC: 7.7 G/DL (ref 6.4–8.2)
PROTHROMBIN TIME: 14.4 SECONDS (ref 11.6–14.8)
RBC # BLD AUTO: 4.74 X10(6)UL
SARS-COV-2 RNA RESP QL NAA+PROBE: NOT DETECTED
SODIUM SERPL-SCNC: 136 MMOL/L (ref 136–145)
TROPONIN I HIGH SENSITIVITY: 21 NG/L
WBC # BLD AUTO: 6.7 X10(3) UL (ref 4–11)

## 2022-05-21 PROCEDURE — 96365 THER/PROPH/DIAG IV INF INIT: CPT

## 2022-05-21 PROCEDURE — 85730 THROMBOPLASTIN TIME PARTIAL: CPT | Performed by: EMERGENCY MEDICINE

## 2022-05-21 PROCEDURE — 93010 ELECTROCARDIOGRAM REPORT: CPT

## 2022-05-21 PROCEDURE — 96367 TX/PROPH/DG ADDL SEQ IV INF: CPT

## 2022-05-21 PROCEDURE — 84484 ASSAY OF TROPONIN QUANT: CPT | Performed by: EMERGENCY MEDICINE

## 2022-05-21 PROCEDURE — 36415 COLL VENOUS BLD VENIPUNCTURE: CPT

## 2022-05-21 PROCEDURE — 83605 ASSAY OF LACTIC ACID: CPT | Performed by: EMERGENCY MEDICINE

## 2022-05-21 PROCEDURE — 85610 PROTHROMBIN TIME: CPT | Performed by: EMERGENCY MEDICINE

## 2022-05-21 PROCEDURE — 99284 EMERGENCY DEPT VISIT MOD MDM: CPT

## 2022-05-21 PROCEDURE — 83880 ASSAY OF NATRIURETIC PEPTIDE: CPT | Performed by: EMERGENCY MEDICINE

## 2022-05-21 PROCEDURE — 99285 EMERGENCY DEPT VISIT HI MDM: CPT

## 2022-05-21 PROCEDURE — 87040 BLOOD CULTURE FOR BACTERIA: CPT | Performed by: EMERGENCY MEDICINE

## 2022-05-21 PROCEDURE — 71045 X-RAY EXAM CHEST 1 VIEW: CPT | Performed by: EMERGENCY MEDICINE

## 2022-05-21 PROCEDURE — 85025 COMPLETE CBC W/AUTO DIFF WBC: CPT | Performed by: EMERGENCY MEDICINE

## 2022-05-21 PROCEDURE — 93005 ELECTROCARDIOGRAM TRACING: CPT

## 2022-05-21 PROCEDURE — 80053 COMPREHEN METABOLIC PANEL: CPT | Performed by: EMERGENCY MEDICINE

## 2022-05-22 VITALS
HEIGHT: 61 IN | HEART RATE: 81 BPM | OXYGEN SATURATION: 94 % | TEMPERATURE: 98 F | DIASTOLIC BLOOD PRESSURE: 80 MMHG | BODY MASS INDEX: 27.38 KG/M2 | SYSTOLIC BLOOD PRESSURE: 116 MMHG | WEIGHT: 145 LBS | RESPIRATION RATE: 18 BRPM

## 2022-05-22 LAB — TROPONIN I HIGH SENSITIVITY: 18 NG/L

## 2022-05-22 PROCEDURE — 84484 ASSAY OF TROPONIN QUANT: CPT | Performed by: EMERGENCY MEDICINE

## 2022-05-22 PROCEDURE — 93005 ELECTROCARDIOGRAM TRACING: CPT

## 2022-05-22 NOTE — ED INITIAL ASSESSMENT (HPI)
Pt presents with fatigue, weakness, difficulty breathing, fever, cough, and chest pain with inspiration.  Diagnosed with pneumonia at [facility] today and prescribed doxycyline and a second unknown antibiotic

## 2022-05-23 LAB
ATRIAL RATE: 100 BPM
ATRIAL RATE: 91 BPM
P AXIS: 22 DEGREES
P AXIS: 43 DEGREES
P-R INTERVAL: 206 MS
P-R INTERVAL: 208 MS
Q-T INTERVAL: 352 MS
Q-T INTERVAL: 396 MS
QRS DURATION: 116 MS
QRS DURATION: 120 MS
QTC CALCULATION (BEZET): 454 MS
QTC CALCULATION (BEZET): 487 MS
R AXIS: -40 DEGREES
R AXIS: -42 DEGREES
T AXIS: 24 DEGREES
T AXIS: 79 DEGREES
VENTRICULAR RATE: 100 BPM
VENTRICULAR RATE: 91 BPM

## 2022-06-15 ENCOUNTER — OFFICE VISIT (OUTPATIENT)
Dept: FAMILY MEDICINE CLINIC | Facility: CLINIC | Age: 82
End: 2022-06-15
Payer: MEDICARE

## 2022-06-15 ENCOUNTER — HOSPITAL ENCOUNTER (OUTPATIENT)
Dept: GENERAL RADIOLOGY | Age: 82
Discharge: HOME | End: 2022-06-15
Attending: FAMILY MEDICINE
Payer: MEDICARE

## 2022-06-15 VITALS
SYSTOLIC BLOOD PRESSURE: 104 MMHG | WEIGHT: 146 LBS | OXYGEN SATURATION: 97 % | HEART RATE: 79 BPM | HEIGHT: 61 IN | RESPIRATION RATE: 18 BRPM | BODY MASS INDEX: 27.56 KG/M2 | DIASTOLIC BLOOD PRESSURE: 60 MMHG

## 2022-06-15 DIAGNOSIS — J18.9 PNEUMONIA OF BOTH LUNGS DUE TO INFECTIOUS ORGANISM, UNSPECIFIED PART OF LUNG: Primary | ICD-10-CM

## 2022-06-15 DIAGNOSIS — J18.9 PNEUMONIA OF BOTH LUNGS DUE TO INFECTIOUS ORGANISM, UNSPECIFIED PART OF LUNG: ICD-10-CM

## 2022-06-15 PROCEDURE — 99214 OFFICE O/P EST MOD 30 MIN: CPT | Performed by: FAMILY MEDICINE

## 2022-06-15 PROCEDURE — 71046 X-RAY EXAM CHEST 2 VIEWS: CPT | Performed by: FAMILY MEDICINE

## 2022-06-15 RX ORDER — POLYMYXIN B SULFATE AND TRIMETHOPRIM 1; 10000 MG/ML; [USP'U]/ML
SOLUTION OPHTHALMIC
COMMUNITY
Start: 2022-05-23

## 2022-06-15 RX ORDER — ALBUTEROL SULFATE 90 UG/1
2 AEROSOL, METERED RESPIRATORY (INHALATION) EVERY 6 HOURS PRN
Qty: 18 G | Refills: 1 | Status: SHIPPED | OUTPATIENT
Start: 2022-06-15

## 2022-06-15 RX ORDER — BENZONATATE 200 MG/1
200 CAPSULE ORAL 3 TIMES DAILY PRN
Qty: 90 CAPSULE | Refills: 3 | Status: SHIPPED | OUTPATIENT
Start: 2022-06-15

## 2022-06-17 ENCOUNTER — TELEPHONE (OUTPATIENT)
Dept: FAMILY MEDICINE CLINIC | Facility: CLINIC | Age: 82
End: 2022-06-17

## 2022-06-17 NOTE — TELEPHONE ENCOUNTER
----- Message from Floyd Castano MD sent at 6/17/2022 10:08 AM CDT -----  CXR is stable. As long as he is symptomatically feeling better no further mgmt at this time. We can repeat a chest xray in the future if needed.

## 2022-06-17 NOTE — TELEPHONE ENCOUNTER
Called pt's daughter (on HIPAA) and was informed of chest xray result and recommendations from provider. Daughter states pt is doing okay but will call office with any changes. All questions answered and daughter verbalized understanding.

## 2022-07-14 ENCOUNTER — TELEPHONE (OUTPATIENT)
Dept: FAMILY MEDICINE CLINIC | Facility: CLINIC | Age: 82
End: 2022-07-14

## 2022-07-14 DIAGNOSIS — E78.2 MIXED HYPERLIPIDEMIA: ICD-10-CM

## 2022-07-14 RX ORDER — ROSUVASTATIN CALCIUM 5 MG/1
TABLET, COATED ORAL
Qty: 90 TABLET | Refills: 3 | Status: SHIPPED | OUTPATIENT
Start: 2022-07-14 | End: 2023-07-20

## 2022-07-14 NOTE — TELEPHONE ENCOUNTER
pts daughter called states pt still has cough. Pt had pneumonia 6/15, states dr Rasheeda Petty told him if cough does not stop to follow up. There are no appts available for a week. Please advise.

## 2022-07-14 NOTE — TELEPHONE ENCOUNTER
LF 4/4/2022 with 30 tabs + 0 refills   LOV 6/15/2022  Future Appointments   Date Time Provider Luis Muna   7/15/2022  3:20 PM Nazario Edwards MD [facility]   10/20/2022 10:20 AM Nazario Edwards MD [facility]

## 2022-07-15 ENCOUNTER — OFFICE VISIT (OUTPATIENT)
Dept: FAMILY MEDICINE CLINIC | Facility: CLINIC | Age: 82
End: 2022-07-15
Payer: MEDICARE

## 2022-07-15 VITALS
HEART RATE: 76 BPM | SYSTOLIC BLOOD PRESSURE: 112 MMHG | WEIGHT: 149 LBS | DIASTOLIC BLOOD PRESSURE: 62 MMHG | OXYGEN SATURATION: 97 % | BODY MASS INDEX: 28.13 KG/M2 | HEIGHT: 61 IN | RESPIRATION RATE: 18 BRPM

## 2022-07-15 DIAGNOSIS — R05.3 CHRONIC COUGH: Primary | ICD-10-CM

## 2022-07-15 DIAGNOSIS — M54.9 OTHER ACUTE BACK PAIN: ICD-10-CM

## 2022-07-15 DIAGNOSIS — J84.9 INTERSTITIAL LUNG DISEASE (HCC): ICD-10-CM

## 2022-07-15 PROCEDURE — 99214 OFFICE O/P EST MOD 30 MIN: CPT | Performed by: FAMILY MEDICINE

## 2022-07-15 RX ORDER — CODEINE PHOSPHATE AND GUAIFENESIN 10; 100 MG/5ML; MG/5ML
5 SOLUTION ORAL 3 TIMES DAILY PRN
Qty: 300 ML | Refills: 0 | Status: SHIPPED | OUTPATIENT
Start: 2022-07-15

## 2022-07-15 RX ORDER — CYCLOBENZAPRINE HCL 5 MG
5 TABLET ORAL NIGHTLY PRN
Qty: 30 TABLET | Refills: 0 | Status: SHIPPED | OUTPATIENT
Start: 2022-07-15

## 2022-11-01 DIAGNOSIS — J34.9 SINUS DISEASE: ICD-10-CM

## 2022-11-02 RX ORDER — CETIRIZINE HYDROCHLORIDE 10 MG/1
TABLET ORAL
Qty: 30 TABLET | Refills: 0 | Status: SHIPPED | OUTPATIENT
Start: 2022-11-02

## 2023-04-03 ENCOUNTER — OFFICE VISIT (OUTPATIENT)
Dept: FAMILY MEDICINE CLINIC | Facility: CLINIC | Age: 83
End: 2023-04-03
Payer: MEDICARE

## 2023-04-03 ENCOUNTER — LAB ENCOUNTER (OUTPATIENT)
Dept: LAB | Age: 83
End: 2023-04-03
Attending: FAMILY MEDICINE
Payer: MEDICARE

## 2023-04-03 VITALS
HEIGHT: 61 IN | WEIGHT: 154 LBS | DIASTOLIC BLOOD PRESSURE: 66 MMHG | RESPIRATION RATE: 16 BRPM | SYSTOLIC BLOOD PRESSURE: 106 MMHG | BODY MASS INDEX: 29.07 KG/M2 | HEART RATE: 63 BPM | OXYGEN SATURATION: 98 %

## 2023-04-03 DIAGNOSIS — R05.3 CHRONIC COUGH: ICD-10-CM

## 2023-04-03 DIAGNOSIS — J84.9 INTERSTITIAL LUNG DISEASE (HCC): ICD-10-CM

## 2023-04-03 DIAGNOSIS — H91.91 HEARING DECREASED, RIGHT: ICD-10-CM

## 2023-04-03 DIAGNOSIS — Z00.00 LABORATORY EXAMINATION ORDERED AS PART OF A ROUTINE GENERAL MEDICAL EXAMINATION: ICD-10-CM

## 2023-04-03 DIAGNOSIS — I10 ESSENTIAL HYPERTENSION: ICD-10-CM

## 2023-04-03 DIAGNOSIS — Z00.00 ENCOUNTER FOR ANNUAL HEALTH EXAMINATION: Primary | ICD-10-CM

## 2023-04-03 DIAGNOSIS — M06.9 RHEUMATOID ARTHRITIS INVOLVING MULTIPLE SITES, UNSPECIFIED WHETHER RHEUMATOID FACTOR PRESENT (HCC): ICD-10-CM

## 2023-04-03 LAB
ALBUMIN SERPL-MCNC: 3.2 G/DL (ref 3.4–5)
ALBUMIN/GLOB SERPL: 0.6 {RATIO} (ref 1–2)
ALP LIVER SERPL-CCNC: 98 U/L
ALT SERPL-CCNC: 65 U/L
ANION GAP SERPL CALC-SCNC: 3 MMOL/L (ref 0–18)
AST SERPL-CCNC: 45 U/L (ref 15–37)
BASOPHILS # BLD: 0 X10(3) UL (ref 0–0.2)
BASOPHILS NFR BLD: 0 %
BILIRUB SERPL-MCNC: 0.5 MG/DL (ref 0.1–2)
BUN BLD-MCNC: 30 MG/DL (ref 7–18)
CALCIUM BLD-MCNC: 9.1 MG/DL (ref 8.5–10.1)
CHLORIDE SERPL-SCNC: 106 MMOL/L (ref 98–112)
CHOLEST SERPL-MCNC: 137 MG/DL (ref ?–200)
CO2 SERPL-SCNC: 27 MMOL/L (ref 21–32)
CREAT BLD-MCNC: 1.24 MG/DL
EOSINOPHIL # BLD: 0 X10(3) UL (ref 0–0.7)
EOSINOPHIL NFR BLD: 0 %
ERYTHROCYTE [DISTWIDTH] IN BLOOD BY AUTOMATED COUNT: 16.1 %
FASTING PATIENT LIPID ANSWER: NO
FASTING STATUS PATIENT QL REPORTED: NO
GFR SERPLBLD BASED ON 1.73 SQ M-ARVRAT: 58 ML/MIN/1.73M2 (ref 60–?)
GLOBULIN PLAS-MCNC: 5.1 G/DL (ref 2.8–4.4)
GLUCOSE BLD-MCNC: 84 MG/DL (ref 70–99)
HCT VFR BLD AUTO: 50.3 %
HDLC SERPL-MCNC: 69 MG/DL (ref 40–59)
HGB BLD-MCNC: 15.5 G/DL
LDLC SERPL CALC-MCNC: 53 MG/DL (ref ?–100)
LYMPHOCYTES NFR BLD: 1.55 X10(3) UL (ref 1–4)
LYMPHOCYTES NFR BLD: 14 %
MCH RBC QN AUTO: 27.6 PG (ref 26–34)
MCHC RBC AUTO-ENTMCNC: 30.8 G/DL (ref 31–37)
MCV RBC AUTO: 89.5 FL
METAMYELOCYTES # BLD: 0.22 X10(3) UL
METAMYELOCYTES NFR BLD: 2 %
MONOCYTES # BLD: 1 X10(3) UL (ref 0.1–1)
MONOCYTES NFR BLD: 9 %
MORPHOLOGY: NORMAL
MYELOCYTES # BLD: 0.11 X10(3) UL
MYELOCYTES NFR BLD: 1 %
NEUTROPHILS # BLD AUTO: 7.38 X10 (3) UL (ref 1.5–7.7)
NEUTROPHILS NFR BLD: 74 %
NEUTS HYPERSEG # BLD: 8.21 X10(3) UL (ref 1.5–7.7)
NONHDLC SERPL-MCNC: 68 MG/DL (ref ?–130)
OSMOLALITY SERPL CALC.SUM OF ELEC: 287 MOSM/KG (ref 275–295)
PLATELET # BLD AUTO: 201 10(3)UL (ref 150–450)
POTASSIUM SERPL-SCNC: 4.1 MMOL/L (ref 3.5–5.1)
PROT SERPL-MCNC: 8.3 G/DL (ref 6.4–8.2)
RBC # BLD AUTO: 5.62 X10(6)UL
SODIUM SERPL-SCNC: 136 MMOL/L (ref 136–145)
T4 FREE SERPL-MCNC: 1 NG/DL (ref 0.8–1.7)
TOTAL CELLS COUNTED BLD: 100
TRIGL SERPL-MCNC: 75 MG/DL (ref 30–149)
TSI SER-ACNC: 10.1 MIU/ML (ref 0.36–3.74)
VLDLC SERPL CALC-MCNC: 11 MG/DL (ref 0–30)
WBC # BLD AUTO: 11.1 X10(3) UL (ref 4–11)

## 2023-04-03 PROCEDURE — 84439 ASSAY OF FREE THYROXINE: CPT

## 2023-04-03 PROCEDURE — 36415 COLL VENOUS BLD VENIPUNCTURE: CPT

## 2023-04-03 PROCEDURE — 85025 COMPLETE CBC W/AUTO DIFF WBC: CPT

## 2023-04-03 PROCEDURE — 85007 BL SMEAR W/DIFF WBC COUNT: CPT

## 2023-04-03 PROCEDURE — 85027 COMPLETE CBC AUTOMATED: CPT

## 2023-04-03 PROCEDURE — 1125F AMNT PAIN NOTED PAIN PRSNT: CPT | Performed by: FAMILY MEDICINE

## 2023-04-03 PROCEDURE — 80061 LIPID PANEL: CPT

## 2023-04-03 PROCEDURE — G0439 PPPS, SUBSEQ VISIT: HCPCS | Performed by: FAMILY MEDICINE

## 2023-04-03 PROCEDURE — 84443 ASSAY THYROID STIM HORMONE: CPT

## 2023-04-03 PROCEDURE — 80053 COMPREHEN METABOLIC PANEL: CPT

## 2023-04-03 RX ORDER — CODEINE PHOSPHATE AND GUAIFENESIN 10; 100 MG/5ML; MG/5ML
5 SOLUTION ORAL 3 TIMES DAILY PRN
Qty: 300 ML | Refills: 0 | Status: SHIPPED | OUTPATIENT
Start: 2023-04-03

## 2023-04-03 RX ORDER — KETOROLAC TROMETHAMINE 5 MG/ML
SOLUTION OPHTHALMIC
COMMUNITY
Start: 2023-03-01

## 2023-04-03 RX ORDER — BENZONATATE 200 MG/1
200 CAPSULE ORAL 3 TIMES DAILY PRN
Qty: 90 CAPSULE | Refills: 3 | Status: SHIPPED | OUTPATIENT
Start: 2023-04-03

## 2023-04-03 NOTE — PATIENT INSTRUCTIONS
Fran Howell's SCREENING SCHEDULE   Tests on this list are recommended by your physician but may not be covered, or covered at this frequency, by your insurer. Please check with your insurance carrier before scheduling to verify coverage. PREVENTATIVE SERVICES FREQUENCY &  COVERAGE DETAILS LAST COMPLETION DATE   Diabetes Screening    Fasting Blood Sugar / Glucose    One screening every 12 months if never tested or if previously tested but not diagnosed with pre-diabetes   One screening every 6 months if diagnosed with pre-diabetes Lab Results   Component Value Date    GLUCOSE 94 04/01/2009     (H) 05/21/2022        Cardiovascular Disease Screening    Lipid Panel  Cholesterol  Lipoprotein (HDL)  Triglycerides Covered every 5 years for all Medicare beneficiaries without apparent signs or symptoms of cardiovascular disease Lab Results   Component Value Date    CHOLEST 126 03/05/2021    HDL 57 03/05/2021    LDL 56 03/05/2021    TRIG 63 03/05/2021         Electrocardiogram (EKG)   Covered if needed at Welcome to Medicare, and non-screening if indicated for medical reasons 05/23/2022      Ultrasound Screening for Abdominal Aortic Aneurysm (AAA) Covered once in a lifetime for one of the following risk factors    Men who are 73-68 years old and have ever smoked    Anyone with a family history -     Colorectal Cancer Screening  Covered for ages 52-80; only need ONE of the following:    Colonoscopy   Covered every 10 years    Covered every 2 years if patient is at high risk or previous colonoscopy was abnormal -    No recommendations at this time    Flexible Sigmoidoscopy   Covered every 4 years -    Fecal Occult Blood Test Covered annually -   Prostate Cancer Screening    Prostate-Specific Antigen (PSA) Annually No results found for: PSA  There are no preventive care reminders to display for this patient.    Immunizations    Influenza Covered once per flu season  Please get every year -  No recommendations at this time    Pneumococcal Each vaccine (Aossghy23 & Srtxkertj29) covered once after 65 Prevnar 13: 11/18/2019    Larcvqwpy67: 10/04/2018     No recommendations at this time    Hepatitis B One screening covered for patients with certain risk factors   -  No recommendations at this time    Tetanus Toxoid Not covered by Medicare Part B unless medically necessary (cut with metal); may be covered with your pharmacy prescription benefits -    Tetanus, Diptheria and Pertusis TD and TDaP Not covered by Medicare Part B -  No recommendations at this time    Zoster Not covered by Medicare Part B; may be covered with your pharmacy  prescription benefits -  Zoster Vaccines(1 of 2) Never done

## 2023-04-12 ENCOUNTER — TELEPHONE (OUTPATIENT)
Dept: FAMILY MEDICINE CLINIC | Facility: CLINIC | Age: 83
End: 2023-04-12

## 2023-04-12 DIAGNOSIS — R74.01 ELEVATED AST (SGOT): ICD-10-CM

## 2023-04-12 DIAGNOSIS — R74.01 ELEVATED ALT MEASUREMENT: ICD-10-CM

## 2023-04-12 DIAGNOSIS — R53.83 FATIGUE, UNSPECIFIED TYPE: ICD-10-CM

## 2023-04-12 DIAGNOSIS — D72.829 LEUKOCYTOSIS, UNSPECIFIED TYPE: ICD-10-CM

## 2023-04-12 DIAGNOSIS — R79.89 ELEVATED TSH: Primary | ICD-10-CM

## 2023-04-12 NOTE — TELEPHONE ENCOUNTER
----- Message from Charisma Pierre MD sent at 4/12/2023 11:16 AM CDT -----  Multiple lab abnormalities. Would like to repeat bloodwork (CBC, CMP, TSH) in 2 weeks to make sure this is not a lab error.

## 2023-04-12 NOTE — TELEPHONE ENCOUNTER
Called pt and left vm per HIPAA of results and recommendation from provider. Instructed to contact office with any questions. Office number and central scheduling provided.

## 2023-04-13 NOTE — TELEPHONE ENCOUNTER
Pt daughter called wanting to go over pt results,missed call yesterday.   [facility]  [phone]

## 2023-04-13 NOTE — TELEPHONE ENCOUNTER
Pt's wife called to share that pt. Had eaten toast and drank coffee prior to his labs. Confirmed that pt will re do labs next week and she will make sure he does not eat this time for 12hrs before.

## 2023-04-13 NOTE — TELEPHONE ENCOUNTER
Phoned pt's dtr to confirm Pt. Needs to redo labs next week. She said she will call to schedule. Asked if Mom can be added as one of Pt's emergency contacts. I explained Dad would need to sign a new HAIR at his next visit.

## 2023-04-20 ENCOUNTER — LAB ENCOUNTER (OUTPATIENT)
Dept: LAB | Age: 83
End: 2023-04-20
Attending: FAMILY MEDICINE
Payer: MEDICARE

## 2023-04-20 DIAGNOSIS — R79.89 ELEVATED TSH: ICD-10-CM

## 2023-04-20 DIAGNOSIS — D72.829 LEUKOCYTOSIS, UNSPECIFIED TYPE: ICD-10-CM

## 2023-04-20 DIAGNOSIS — R53.83 FATIGUE, UNSPECIFIED TYPE: ICD-10-CM

## 2023-04-20 DIAGNOSIS — R74.01 ELEVATED AST (SGOT): ICD-10-CM

## 2023-04-20 DIAGNOSIS — R74.01 ELEVATED ALT MEASUREMENT: ICD-10-CM

## 2023-04-20 LAB
ALBUMIN SERPL-MCNC: 3 G/DL (ref 3.4–5)
ALBUMIN/GLOB SERPL: 0.7 {RATIO} (ref 1–2)
ALP LIVER SERPL-CCNC: 88 U/L
ALT SERPL-CCNC: 42 U/L
ANION GAP SERPL CALC-SCNC: 8 MMOL/L (ref 0–18)
AST SERPL-CCNC: 35 U/L (ref 15–37)
BASOPHILS # BLD AUTO: 0.1 X10(3) UL (ref 0–0.2)
BASOPHILS NFR BLD AUTO: 1.1 %
BILIRUB SERPL-MCNC: 0.6 MG/DL (ref 0.1–2)
BUN BLD-MCNC: 19 MG/DL (ref 7–18)
CALCIUM BLD-MCNC: 8.9 MG/DL (ref 8.5–10.1)
CHLORIDE SERPL-SCNC: 108 MMOL/L (ref 98–112)
CO2 SERPL-SCNC: 23 MMOL/L (ref 21–32)
CREAT BLD-MCNC: 0.94 MG/DL
EOSINOPHIL # BLD AUTO: 0.45 X10(3) UL (ref 0–0.7)
EOSINOPHIL NFR BLD AUTO: 4.8 %
ERYTHROCYTE [DISTWIDTH] IN BLOOD BY AUTOMATED COUNT: 17.4 %
FASTING STATUS PATIENT QL REPORTED: YES
GFR SERPLBLD BASED ON 1.73 SQ M-ARVRAT: 81 ML/MIN/1.73M2 (ref 60–?)
GLOBULIN PLAS-MCNC: 4.4 G/DL (ref 2.8–4.4)
GLUCOSE BLD-MCNC: 84 MG/DL (ref 70–99)
HCT VFR BLD AUTO: 47.1 %
HGB BLD-MCNC: 14.5 G/DL
IMM GRANULOCYTES # BLD AUTO: 0.12 X10(3) UL (ref 0–1)
IMM GRANULOCYTES NFR BLD: 1.3 %
LYMPHOCYTES # BLD AUTO: 2.69 X10(3) UL (ref 1–4)
LYMPHOCYTES NFR BLD AUTO: 28.5 %
MCH RBC QN AUTO: 27.6 PG (ref 26–34)
MCHC RBC AUTO-ENTMCNC: 30.8 G/DL (ref 31–37)
MCV RBC AUTO: 89.7 FL
MONOCYTES # BLD AUTO: 0.69 X10(3) UL (ref 0.1–1)
MONOCYTES NFR BLD AUTO: 7.3 %
NEUTROPHILS # BLD AUTO: 5.38 X10 (3) UL (ref 1.5–7.7)
NEUTROPHILS # BLD AUTO: 5.38 X10(3) UL (ref 1.5–7.7)
NEUTROPHILS NFR BLD AUTO: 57 %
OSMOLALITY SERPL CALC.SUM OF ELEC: 289 MOSM/KG (ref 275–295)
PLATELET # BLD AUTO: 123 10(3)UL (ref 150–450)
POTASSIUM SERPL-SCNC: 4 MMOL/L (ref 3.5–5.1)
PROT SERPL-MCNC: 7.4 G/DL (ref 6.4–8.2)
RBC # BLD AUTO: 5.25 X10(6)UL
SODIUM SERPL-SCNC: 139 MMOL/L (ref 136–145)
T4 FREE SERPL-MCNC: 1 NG/DL (ref 0.8–1.7)
TSI SER-ACNC: 5.11 MIU/ML (ref 0.36–3.74)
WBC # BLD AUTO: 9.4 X10(3) UL (ref 4–11)

## 2023-04-20 PROCEDURE — 84439 ASSAY OF FREE THYROXINE: CPT

## 2023-04-20 PROCEDURE — 80053 COMPREHEN METABOLIC PANEL: CPT

## 2023-04-20 PROCEDURE — 84443 ASSAY THYROID STIM HORMONE: CPT

## 2023-04-20 PROCEDURE — 36415 COLL VENOUS BLD VENIPUNCTURE: CPT

## 2023-04-20 PROCEDURE — 85025 COMPLETE CBC W/AUTO DIFF WBC: CPT

## 2023-04-25 ENCOUNTER — TELEPHONE (OUTPATIENT)
Dept: FAMILY MEDICINE CLINIC | Facility: CLINIC | Age: 83
End: 2023-04-25

## 2023-04-25 NOTE — TELEPHONE ENCOUNTER
----- Message from Macrina Watson MD sent at 4/24/2023 12:49 PM CDT -----  Labs stable - TSH at goal for age. No further orders needed at this time.      Spoke to pt's daughter(on HIPPA) with results

## 2023-07-20 DIAGNOSIS — E78.2 MIXED HYPERLIPIDEMIA: ICD-10-CM

## 2023-07-20 RX ORDER — ROSUVASTATIN CALCIUM 5 MG/1
TABLET, COATED ORAL
Qty: 90 TABLET | Refills: 0 | Status: SHIPPED | OUTPATIENT
Start: 2023-07-20

## 2023-07-25 DIAGNOSIS — E78.2 MIXED HYPERLIPIDEMIA: ICD-10-CM

## 2023-07-25 RX ORDER — ROSUVASTATIN CALCIUM 5 MG/1
5 TABLET, COATED ORAL NIGHTLY
Qty: 90 TABLET | Refills: 0 | OUTPATIENT
Start: 2023-07-25

## 2023-10-16 ENCOUNTER — OFFICE VISIT (OUTPATIENT)
Dept: FAMILY MEDICINE CLINIC | Facility: CLINIC | Age: 83
End: 2023-10-16
Payer: MEDICARE

## 2023-10-16 VITALS
HEIGHT: 61 IN | SYSTOLIC BLOOD PRESSURE: 100 MMHG | BODY MASS INDEX: 29.27 KG/M2 | DIASTOLIC BLOOD PRESSURE: 64 MMHG | WEIGHT: 155 LBS

## 2023-10-16 DIAGNOSIS — I10 ESSENTIAL HYPERTENSION: Primary | ICD-10-CM

## 2023-10-16 DIAGNOSIS — J01.10 ACUTE NON-RECURRENT FRONTAL SINUSITIS: ICD-10-CM

## 2023-10-16 DIAGNOSIS — J34.9 SINUS DISEASE: ICD-10-CM

## 2023-10-16 DIAGNOSIS — E78.2 MIXED HYPERLIPIDEMIA: ICD-10-CM

## 2023-10-16 PROCEDURE — 99214 OFFICE O/P EST MOD 30 MIN: CPT | Performed by: NURSE PRACTITIONER

## 2023-10-16 RX ORDER — ROSUVASTATIN CALCIUM 5 MG/1
5 TABLET, COATED ORAL NIGHTLY
Qty: 90 TABLET | Refills: 1 | Status: SHIPPED | OUTPATIENT
Start: 2023-10-16

## 2023-10-16 RX ORDER — CETIRIZINE HYDROCHLORIDE 10 MG/1
10 TABLET ORAL DAILY
Qty: 90 TABLET | Refills: 0 | Status: SHIPPED | OUTPATIENT
Start: 2023-10-16 | End: 2024-01-14

## 2023-10-16 RX ORDER — AZITHROMYCIN 250 MG/1
TABLET, FILM COATED ORAL
Qty: 6 TABLET | Refills: 0 | Status: SHIPPED | OUTPATIENT
Start: 2023-10-16 | End: 2023-10-20

## 2023-10-20 ENCOUNTER — TELEPHONE (OUTPATIENT)
Dept: FAMILY MEDICINE CLINIC | Facility: CLINIC | Age: 83
End: 2023-10-20

## 2023-10-20 NOTE — TELEPHONE ENCOUNTER
Scheduled Pre-op for patient for surgery on 11/15/23. Surgeon: Dr. Mirta Reynolds  Location: [facility] 1345 orders faxed to [facility].

## 2023-10-23 ENCOUNTER — LAB ENCOUNTER (OUTPATIENT)
Dept: LAB | Age: 83
End: 2023-10-23
Attending: NURSE PRACTITIONER

## 2023-10-23 ENCOUNTER — TELEPHONE (OUTPATIENT)
Dept: FAMILY MEDICINE CLINIC | Facility: CLINIC | Age: 83
End: 2023-10-23

## 2023-10-23 ENCOUNTER — EKG ENCOUNTER (OUTPATIENT)
Dept: LAB | Age: 83
End: 2023-10-23
Attending: NURSE PRACTITIONER
Payer: MEDICARE

## 2023-10-23 DIAGNOSIS — Z01.818 PRE-OP TESTING: Primary | ICD-10-CM

## 2023-10-23 DIAGNOSIS — Z01.818 PRE-OP TESTING: ICD-10-CM

## 2023-10-23 LAB
ALBUMIN SERPL-MCNC: 3.2 G/DL (ref 3.4–5)
ALBUMIN/GLOB SERPL: 0.6 {RATIO} (ref 1–2)
ALP LIVER SERPL-CCNC: 80 U/L
ALT SERPL-CCNC: 43 U/L
ANION GAP SERPL CALC-SCNC: 5 MMOL/L (ref 0–18)
AST SERPL-CCNC: 49 U/L (ref 15–37)
ATRIAL RATE: 62 BPM
BASOPHILS # BLD AUTO: 0.1 X10(3) UL (ref 0–0.2)
BASOPHILS NFR BLD AUTO: 1.2 %
BILIRUB SERPL-MCNC: 0.6 MG/DL (ref 0.1–2)
BUN BLD-MCNC: 22 MG/DL (ref 7–18)
CALCIUM BLD-MCNC: 9.5 MG/DL (ref 8.5–10.1)
CHLORIDE SERPL-SCNC: 105 MMOL/L (ref 98–112)
CO2 SERPL-SCNC: 27 MMOL/L (ref 21–32)
CREAT BLD-MCNC: 1.09 MG/DL
EGFRCR SERPLBLD CKD-EPI 2021: 68 ML/MIN/1.73M2 (ref 60–?)
EOSINOPHIL # BLD AUTO: 0.19 X10(3) UL (ref 0–0.7)
EOSINOPHIL NFR BLD AUTO: 2.3 %
ERYTHROCYTE [DISTWIDTH] IN BLOOD BY AUTOMATED COUNT: 17.4 %
FASTING STATUS PATIENT QL REPORTED: YES
GLOBULIN PLAS-MCNC: 5.2 G/DL (ref 2.8–4.4)
GLUCOSE BLD-MCNC: 88 MG/DL (ref 70–99)
HCT VFR BLD AUTO: 49.1 %
HGB BLD-MCNC: 15.1 G/DL
IMM GRANULOCYTES # BLD AUTO: 0.12 X10(3) UL (ref 0–1)
IMM GRANULOCYTES NFR BLD: 1.5 %
LYMPHOCYTES # BLD AUTO: 1.89 X10(3) UL (ref 1–4)
LYMPHOCYTES NFR BLD AUTO: 22.9 %
MCH RBC QN AUTO: 26.9 PG (ref 26–34)
MCHC RBC AUTO-ENTMCNC: 30.8 G/DL (ref 31–37)
MCV RBC AUTO: 87.5 FL
MONOCYTES # BLD AUTO: 0.9 X10(3) UL (ref 0.1–1)
MONOCYTES NFR BLD AUTO: 10.9 %
NEUTROPHILS # BLD AUTO: 5.04 X10 (3) UL (ref 1.5–7.7)
NEUTROPHILS # BLD AUTO: 5.04 X10(3) UL (ref 1.5–7.7)
NEUTROPHILS NFR BLD AUTO: 61.2 %
OSMOLALITY SERPL CALC.SUM OF ELEC: 287 MOSM/KG (ref 275–295)
P AXIS: 6 DEGREES
P-R INTERVAL: 254 MS
PLATELET # BLD AUTO: 164 10(3)UL (ref 150–450)
POTASSIUM SERPL-SCNC: 4.5 MMOL/L (ref 3.5–5.1)
PROT SERPL-MCNC: 8.4 G/DL (ref 6.4–8.2)
Q-T INTERVAL: 428 MS
QRS DURATION: 128 MS
QTC CALCULATION (BEZET): 434 MS
R AXIS: -31 DEGREES
RBC # BLD AUTO: 5.61 X10(6)UL
SODIUM SERPL-SCNC: 137 MMOL/L (ref 136–145)
T AXIS: 24 DEGREES
VENTRICULAR RATE: 62 BPM
WBC # BLD AUTO: 8.2 X10(3) UL (ref 4–11)

## 2023-10-23 PROCEDURE — 80053 COMPREHEN METABOLIC PANEL: CPT

## 2023-10-23 PROCEDURE — 93010 ELECTROCARDIOGRAM REPORT: CPT | Performed by: INTERNAL MEDICINE

## 2023-10-23 PROCEDURE — 36415 COLL VENOUS BLD VENIPUNCTURE: CPT

## 2023-10-23 PROCEDURE — 93005 ELECTROCARDIOGRAM TRACING: CPT

## 2023-10-23 PROCEDURE — 85025 COMPLETE CBC W/AUTO DIFF WBC: CPT

## 2023-10-23 NOTE — TELEPHONE ENCOUNTER
Left message for patient to call office regarding today's pre-op appointment. Patient needs EKG and CMP for pre-op.

## 2023-11-09 ENCOUNTER — TELEPHONE (OUTPATIENT)
Dept: FAMILY MEDICINE CLINIC | Facility: CLINIC | Age: 83
End: 2023-11-09

## 2023-11-09 NOTE — TELEPHONE ENCOUNTER
Called Dr Ja Rosenthal office to check status of cardiac clearance and was informed patient never called to schedule an appointment for clearance as instructed. Informed [facility], patient did not follow up for cardiac clearance.

## 2023-11-10 NOTE — TELEPHONE ENCOUNTER
Patient's daughter in law Socrates Sanchez was calling to ask why patient needs Cardiac clearance for his surgery, please advise.

## 2023-11-10 NOTE — TELEPHONE ENCOUNTER
Message left on  nicolas Dunn at [phone] to call office back. Dr. Klever Cota reviewed pre-op requirements & pt's EKG, he does not feel pt needs to see Cardiologist, Dr. Klever Cota would like to see him Monday 11/13 (Lester Valdes to add pt anywhere for 20 min appt) for a pre-op clearance appt.

## 2023-11-10 NOTE — TELEPHONE ENCOUNTER
I spoke with Delia Sanchez & notified her below, she would like an appt around lunch time if possible. She states [facility] called & was going to cancel pt's surgery because he had no Cardiac Clearance, she will call their office back to see if they cancelled his surgery. I called Nyclair Sanchez back to see if she reached their office since our phones were off & call went to . I left message to call office back tomorrow morning or Monday morning with update & if pt still needs pre-op appt Monday with Dr. Ramos Shah. PSR: If Delia Sanchez calls back & pt still needs pre-op appt on Mon 11/13, Christina Bella per Dr. Ramos Shah to double book him. Thank you.

## 2023-11-13 ENCOUNTER — OFFICE VISIT (OUTPATIENT)
Dept: FAMILY MEDICINE CLINIC | Facility: CLINIC | Age: 83
End: 2023-11-13
Payer: MEDICARE

## 2023-11-13 ENCOUNTER — TELEPHONE (OUTPATIENT)
Dept: FAMILY MEDICINE CLINIC | Facility: CLINIC | Age: 83
End: 2023-11-13

## 2023-11-13 VITALS
OXYGEN SATURATION: 98 % | WEIGHT: 156 LBS | RESPIRATION RATE: 18 BRPM | HEIGHT: 61 IN | SYSTOLIC BLOOD PRESSURE: 104 MMHG | HEART RATE: 59 BPM | DIASTOLIC BLOOD PRESSURE: 58 MMHG | BODY MASS INDEX: 29.45 KG/M2

## 2023-11-13 DIAGNOSIS — M06.9 RHEUMATOID ARTHRITIS INVOLVING MULTIPLE SITES, UNSPECIFIED WHETHER RHEUMATOID FACTOR PRESENT (HCC): ICD-10-CM

## 2023-11-13 DIAGNOSIS — Z91.89 AT HIGH RISK FOR ADVERSE MEDICATION EVENT: ICD-10-CM

## 2023-11-13 DIAGNOSIS — H02.132 SENILE ECTROPION OF BOTH LOWER EYELIDS: Primary | ICD-10-CM

## 2023-11-13 DIAGNOSIS — H02.135 SENILE ECTROPION OF BOTH LOWER EYELIDS: Primary | ICD-10-CM

## 2023-11-13 DIAGNOSIS — Z01.818 PREOP EXAMINATION: ICD-10-CM

## 2023-11-13 RX ORDER — ERYTHROMYCIN 5 MG/G
1 OINTMENT OPHTHALMIC AS DIRECTED
COMMUNITY
Start: 2023-10-16

## 2023-11-13 NOTE — TELEPHONE ENCOUNTER
COMPREHENSIVE MEDICATION REVIEW         Veronica Shearer MRN [id number]    3/26/1940 PCP Jasmin Lau MD     Comments: Medication history completed by 12 Jessica Rocha Pharmacist with patient and children over the phone on 23 per referral request.     After thorough medication review, 14 discrepancies have been identified and corrected on patient's medication list. See updated list below:     Outpatient Encounter Medications as of 2023   Medication Sig    metoprolol tartrate 25 MG Oral Tab Take 1 tablet (25 mg total) by mouth daily. rosuvastatin 5 MG Oral Tab Take 1 tablet (5 mg total) by mouth nightly. predniSONE 2.5 MG Oral Tab Take 1 tablet (2.5 mg total) by mouth daily. Cholecalciferol (VITAMIN D) 50 MCG (2000 UT) Oral Tab Take 2,000 Units by mouth daily. aspirin 81 MG Oral Tab EC Take 1 tablet (81 mg total) by mouth daily. Esomeprazole Magnesium 40 MG Oral Capsule Delayed Release Take 1 capsule (40 mg total) by mouth daily as needed. Hydroxychloroquine Sulfate (PLAQUENIL) 200 MG Oral Tab Take 1 tablet (200 mg total) by mouth daily. erythromycin 5 MG/GM Ophthalmic Ointment Place 1 Application into both eyes As Directed. Post procedure as directed     Medication Assessment:   Reviewed all medications in detail with patient and children including dose, indication, timing of administration, monitoring parameters, and potential side effects of medications. Removed several medications from list that patient is no longer taking. Son states patient is no longer taking methotrexate or leflunomide for RA. He is on hydroxychloroquine 200 mg daily and takes prednisone 2.5 mg daily. Daughter states patient has an upcoming eye procedure and will be using the erythromycin ointment post procedure. Did provide education and stressed the importance of taking medication just like prescribed to get the most benefit.  Patient denies forgetting or missing medication doses. Family denies any questions or concerns with medications at this time.      Thank you,    Julia Lucia, PharmD, 11/13/2023, 3:07 PM

## 2023-11-13 NOTE — TELEPHONE ENCOUNTER
Pre-op clearance completed and fax to [facility] Exam # [id number]. Success confirmation was received. Called daughter Parris Yuen ) to notify .

## 2024-03-25 ENCOUNTER — APPOINTMENT (OUTPATIENT)
Dept: GENERAL RADIOLOGY | Age: 84
End: 2024-03-25
Attending: PHYSICIAN ASSISTANT
Payer: MEDICARE

## 2024-03-25 ENCOUNTER — HOSPITAL ENCOUNTER (INPATIENT)
Facility: HOSPITAL | Age: 84
LOS: 4 days | Discharge: HOME | End: 2024-03-29
Attending: EMERGENCY MEDICINE | Admitting: STUDENT IN AN ORGANIZED HEALTH CARE EDUCATION/TRAINING PROGRAM
Payer: MEDICARE

## 2024-03-25 ENCOUNTER — HOSPITAL ENCOUNTER (INPATIENT)
Facility: HOSPITAL | Age: 84
LOS: 4 days | Discharge: HOME HEALTH | End: 2024-03-29
Attending: EMERGENCY MEDICINE | Admitting: STUDENT IN AN ORGANIZED HEALTH CARE EDUCATION/TRAINING PROGRAM
Payer: MEDICARE

## 2024-03-25 DIAGNOSIS — R53.1 WEAKNESS GENERALIZED: Primary | ICD-10-CM

## 2024-03-25 LAB
ALBUMIN SERPL-MCNC: 2.7 G/DL (ref 3.4–5)
ALBUMIN/GLOB SERPL: 0.6 {RATIO} (ref 1–2)
ALP LIVER SERPL-CCNC: 92 U/L
ALT SERPL-CCNC: 29 U/L
ANION GAP SERPL CALC-SCNC: 7 MMOL/L (ref 0–18)
AST SERPL-CCNC: 35 U/L (ref 15–37)
ATRIAL RATE: 86 BPM
BASOPHILS # BLD AUTO: 0.03 X10(3) UL (ref 0–0.2)
BASOPHILS NFR BLD AUTO: 0.3 %
BILIRUB SERPL-MCNC: 0.7 MG/DL (ref 0.1–2)
BILIRUB UR QL STRIP.AUTO: NEGATIVE
BUN BLD-MCNC: 17 MG/DL (ref 9–23)
CALCIUM BLD-MCNC: 8.7 MG/DL (ref 8.5–10.1)
CHLORIDE SERPL-SCNC: 104 MMOL/L (ref 98–112)
CLARITY UR REFRACT.AUTO: CLEAR
CO2 SERPL-SCNC: 25 MMOL/L (ref 21–32)
COLOR UR AUTO: COLORLESS
CREAT BLD-MCNC: 1.07 MG/DL
EGFRCR SERPLBLD CKD-EPI 2021: 69 ML/MIN/1.73M2 (ref 60–?)
EOSINOPHIL # BLD AUTO: 0.01 X10(3) UL (ref 0–0.7)
EOSINOPHIL NFR BLD AUTO: 0.1 %
ERYTHROCYTE [DISTWIDTH] IN BLOOD BY AUTOMATED COUNT: 15.8 %
GLOBULIN PLAS-MCNC: 4.9 G/DL (ref 2.8–4.4)
GLUCOSE BLD-MCNC: 100 MG/DL (ref 70–99)
GLUCOSE UR STRIP.AUTO-MCNC: NORMAL MG/DL
HCT VFR BLD AUTO: 41.9 %
HGB BLD-MCNC: 13.4 G/DL
IMM GRANULOCYTES # BLD AUTO: 0.06 X10(3) UL (ref 0–1)
IMM GRANULOCYTES NFR BLD: 0.6 %
KETONES UR STRIP.AUTO-MCNC: NEGATIVE MG/DL
LACTATE SERPL-SCNC: 2.7 MMOL/L (ref 0.4–2)
LEUKOCYTE ESTERASE UR QL STRIP.AUTO: NEGATIVE
LYMPHOCYTES # BLD AUTO: 0.44 X10(3) UL (ref 1–4)
LYMPHOCYTES NFR BLD AUTO: 4.3 %
MCH RBC QN AUTO: 26.7 PG (ref 26–34)
MCHC RBC AUTO-ENTMCNC: 32 G/DL (ref 31–37)
MCV RBC AUTO: 83.6 FL
MONOCYTES # BLD AUTO: 0.79 X10(3) UL (ref 0.1–1)
MONOCYTES NFR BLD AUTO: 7.8 %
NEUTROPHILS # BLD AUTO: 8.86 X10 (3) UL (ref 1.5–7.7)
NEUTROPHILS # BLD AUTO: 8.86 X10(3) UL (ref 1.5–7.7)
NEUTROPHILS NFR BLD AUTO: 86.9 %
NITRITE UR QL STRIP.AUTO: NEGATIVE
OSMOLALITY SERPL CALC.SUM OF ELEC: 284 MOSM/KG (ref 275–295)
P AXIS: 25 DEGREES
P-R INTERVAL: 236 MS
PH UR STRIP.AUTO: 6 [PH] (ref 5–8)
PLATELET # BLD AUTO: 146 10(3)UL (ref 150–450)
POCT INFLUENZA A: NEGATIVE
POCT INFLUENZA B: NEGATIVE
POTASSIUM SERPL-SCNC: 3.6 MMOL/L (ref 3.5–5.1)
PROCALCITONIN SERPL-MCNC: 0.46 NG/ML (ref ?–0.16)
PROT SERPL-MCNC: 7.6 G/DL (ref 6.4–8.2)
PROT UR STRIP.AUTO-MCNC: NEGATIVE MG/DL
Q-T INTERVAL: 390 MS
QRS DURATION: 122 MS
QTC CALCULATION (BEZET): 466 MS
R AXIS: -33 DEGREES
RBC # BLD AUTO: 5.01 X10(6)UL
SARS-COV-2 RNA RESP QL NAA+PROBE: NOT DETECTED
SODIUM SERPL-SCNC: 136 MMOL/L (ref 136–145)
SP GR UR STRIP.AUTO: <1.005 (ref 1–1.03)
T AXIS: 92 DEGREES
TROPONIN I SERPL HS-MCNC: 18 NG/L
UROBILINOGEN UR STRIP.AUTO-MCNC: NORMAL MG/DL
VENTRICULAR RATE: 86 BPM
WBC # BLD AUTO: 10.2 X10(3) UL (ref 4–11)

## 2024-03-25 PROCEDURE — 71046 X-RAY EXAM CHEST 2 VIEWS: CPT | Performed by: PHYSICIAN ASSISTANT

## 2024-03-25 PROCEDURE — 99223 1ST HOSP IP/OBS HIGH 75: CPT | Performed by: HOSPITALIST

## 2024-03-25 RX ORDER — ASPIRIN 81 MG/1
81 TABLET ORAL DAILY
Status: DISCONTINUED | OUTPATIENT
Start: 2024-03-26 | End: 2024-03-29

## 2024-03-25 RX ORDER — SENNOSIDES 8.6 MG
17.2 TABLET ORAL NIGHTLY PRN
Status: DISCONTINUED | OUTPATIENT
Start: 2024-03-25 | End: 2024-03-29

## 2024-03-25 RX ORDER — ENOXAPARIN SODIUM 100 MG/ML
40 INJECTION SUBCUTANEOUS DAILY
Status: DISCONTINUED | OUTPATIENT
Start: 2024-03-26 | End: 2024-03-29

## 2024-03-25 RX ORDER — DIPHENHYDRAMINE HYDROCHLORIDE 50 MG/ML
50 INJECTION INTRAMUSCULAR; INTRAVENOUS ONCE
Status: COMPLETED | OUTPATIENT
Start: 2024-03-25 | End: 2024-03-25

## 2024-03-25 RX ORDER — ENEMA 19; 7 G/133ML; G/133ML
1 ENEMA RECTAL ONCE AS NEEDED
Status: DISCONTINUED | OUTPATIENT
Start: 2024-03-25 | End: 2024-03-29

## 2024-03-25 RX ORDER — ACETAMINOPHEN 500 MG
1000 TABLET ORAL ONCE
Status: COMPLETED | OUTPATIENT
Start: 2024-03-25 | End: 2024-03-25

## 2024-03-25 RX ORDER — POLYETHYLENE GLYCOL 3350 17 G/17G
17 POWDER, FOR SOLUTION ORAL DAILY PRN
Status: DISCONTINUED | OUTPATIENT
Start: 2024-03-25 | End: 2024-03-29

## 2024-03-25 RX ORDER — METOCLOPRAMIDE HYDROCHLORIDE 5 MG/ML
5 INJECTION INTRAMUSCULAR; INTRAVENOUS EVERY 8 HOURS PRN
Status: DISCONTINUED | OUTPATIENT
Start: 2024-03-25 | End: 2024-03-29

## 2024-03-25 RX ORDER — ROSUVASTATIN CALCIUM 5 MG/1
5 TABLET, COATED ORAL NIGHTLY
Status: DISCONTINUED | OUTPATIENT
Start: 2024-03-25 | End: 2024-03-29

## 2024-03-25 RX ORDER — SODIUM CHLORIDE 9 MG/ML
INJECTION, SOLUTION INTRAVENOUS CONTINUOUS
Status: DISCONTINUED | OUTPATIENT
Start: 2024-03-25 | End: 2024-03-26

## 2024-03-25 RX ORDER — MELATONIN
3 NIGHTLY PRN
Status: DISCONTINUED | OUTPATIENT
Start: 2024-03-25 | End: 2024-03-29

## 2024-03-25 RX ORDER — ONDANSETRON 2 MG/ML
4 INJECTION INTRAMUSCULAR; INTRAVENOUS EVERY 6 HOURS PRN
Status: DISCONTINUED | OUTPATIENT
Start: 2024-03-25 | End: 2024-03-29

## 2024-03-25 RX ORDER — ROSUVASTATIN CALCIUM 5 MG/1
5 TABLET, COATED ORAL NIGHTLY
COMMUNITY
End: 2024-04-03

## 2024-03-25 RX ORDER — ECHINACEA PURPUREA EXTRACT 125 MG
1 TABLET ORAL
Status: DISCONTINUED | OUTPATIENT
Start: 2024-03-25 | End: 2024-03-29

## 2024-03-25 RX ORDER — BISACODYL 10 MG
10 SUPPOSITORY, RECTAL RECTAL
Status: DISCONTINUED | OUTPATIENT
Start: 2024-03-25 | End: 2024-03-29

## 2024-03-25 RX ORDER — SODIUM CHLORIDE 9 MG/ML
INJECTION, SOLUTION INTRAVENOUS
Status: COMPLETED
Start: 2024-03-25 | End: 2024-03-25

## 2024-03-25 RX ORDER — HYDROCODONE BITARTRATE AND ACETAMINOPHEN 5; 325 MG/1; MG/1
1 TABLET ORAL EVERY 6 HOURS PRN
Status: DISCONTINUED | OUTPATIENT
Start: 2024-03-25 | End: 2024-03-29

## 2024-03-25 RX ORDER — CHOLECALCIFEROL (VITAMIN D3) 125 MCG
2000 CAPSULE ORAL DAILY
Status: DISCONTINUED | OUTPATIENT
Start: 2024-03-26 | End: 2024-03-29

## 2024-03-25 RX ORDER — ACETAMINOPHEN 500 MG
500 TABLET ORAL EVERY 4 HOURS PRN
Status: DISCONTINUED | OUTPATIENT
Start: 2024-03-25 | End: 2024-03-29

## 2024-03-25 RX ORDER — AZITHROMYCIN 500 MG/1
INJECTION, POWDER, LYOPHILIZED, FOR SOLUTION INTRAVENOUS
Status: COMPLETED
Start: 2024-03-25 | End: 2024-03-25

## 2024-03-25 RX ORDER — HYDROXYCHLOROQUINE SULFATE 200 MG/1
200 TABLET, FILM COATED ORAL DAILY
Status: DISCONTINUED | OUTPATIENT
Start: 2024-03-26 | End: 2024-03-25

## 2024-03-25 RX ORDER — HYDROXYCHLOROQUINE SULFATE 200 MG/1
200 TABLET, FILM COATED ORAL DAILY
Status: DISCONTINUED | OUTPATIENT
Start: 2024-03-25 | End: 2024-03-29

## 2024-03-25 NOTE — ED PROVIDER NOTES
Patient with a history of coronary artery disease, reflux, hypertension, rheumatoid arthritis.  Patient has been off of prednisone for about 1 month.  Family notes that patient has been excessively weak today.  He has had low-grade fever to 100 °F.  He has an occasional cough.  Family notes that he usually walks with a cane but today he was so weak he could not even get up and move about.      This is an alert adult man who appears in no extraordinary distress.  He does have a low-grade temp.  He is awake and alert.  Eye sclera white  Lungs with diminished breath sounds bilaterally and some faint rhonchi at both bases  Heart sounds normal S1-S2 without murmur  Abdomen is soft.  Patient is tender over large joints but no overlying erythema, swelling, or warmth is noted  Calves are nonswollen  Patient moves all extremities but seems moderately generally weak        Patient treated with IV fluid  Patient did not receive 30ml/kg of fluids despite having: elevated Lactate. The reason for doing so is: patient's blood pressure responded to a lesser volume. 500mL of fluids were given instead (1000ml is the amount of fluids given).   Patient had Tylenol for the fever  Blood culture sent  Patient given stress dose of Solu-Cortef as he had been on long-term steroids    CBC shows normal white count and hemoglobin.  Platelets of 146  Metabolic panel shows normal glucose and electrolytes.  Creatinine normal.  LFTs normal  Lactic elevated  Urinalysis negative nit/leuk  Flu swab negative  COVID test negative    Chest x-ray  I personally reviewed the actual radiographs themselves and my individual interpretation bibasilar increased lung markings noted  radiologist's formal interpretation which I have reviewed  FINDINGS:    Low lung volumes somewhat limit assessment.  Postsurgical changes of the chest are noted.  Heart size is within normal limits.  Basilar airspace opacities are unchanged and have a chronic appearance.  Areas of  scarring are favored.  Superimposed   pneumonia is difficult to entirely exclude.  If there is persistent concern then consider follow-up imaging including CT.       An EKG was performed. I agree with computerized EKG interval interpretations. EKG shows sinus rhythm with first-degree AV block and left ventricular atrophy.  There is some ST depression noted in the lateral leads which was present but not as prominent on previous EKG.. There are no acute ST changes to suggest acute ischemia or infarct  Ventricular rate 86 bpm. VT interval 236 ms. QRS duration 122 ms.    I discussed case with hospitalist, Dr. Kaur.  He will admit        [facility]   part of [facility]      Sepsis Reassessment Note    /62 (BP Location: Right arm)   Pulse 79   Temp 98.1 °F (36.7 °C) (Oral)   Resp 18   Ht 162.6 cm (5' 4\")   Wt 72.1 kg   SpO2 94%   BMI 27.29 kg/m²          Cardiac:  Regularity: Regular  Rate: Normal  Heart Sounds: S1,S2    Lungs:   Right: Clear  Left: Clear    Peripheral Pulses:  Radial: Right 1+ or Left 1+      Capillary Refill:  <3 Secs    Skin:  Temp/Moisture: Warm and Dry  Color: Normal      Diony Reynaga MD  3/25/2024             I provided a substantive portion of care for this patient. I personally performed the medical decision making for this encounter.

## 2024-03-25 NOTE — ED PROVIDER NOTES
Patient Seen in: [facility] In [place]      History     Chief Complaint   Patient presents with    Fever    Weakness     Stated Complaint: fever, weakness, body aches since this morning    Subjective:   HPI    83-year-old male.  Majority of history provided by daughter and wife.  Medical history of coronary artery disease status post CABG, reflux, hypertension, rheumatoid arthritis.  Family explains that the patient's rheumatologist recently left the practice.  He ran out of his daily prednisone at least 1 month ago.  New specialist is unable to see him until this May.  He is also overdue for his injectable.  Acutely, today, family is noted weakness.  Patient unable to ambulate as typical.  Possible new rare cough.  Tmax of 100 patient at baseline per family.      Objective:   Past Medical History:   Diagnosis Date    Arthritis     CAD (coronary artery disease)     s/p CABG    Current episode of major depressive disorder without prior episode 2021    Esophageal reflux     History of 2019 novel coronavirus disease (COVID-19) 12/15/2020    HTN (hypertension)     Rheumatoid arthritis (HCC)               Past Surgical History:   Procedure Laterality Date    APPENDECTOMY      BYPASS SURGERY      REPAIR ING HERNIA,5+Y/O,REDUCIBL                  Social History     Socioeconomic History    Marital status:    Tobacco Use    Smoking status: Former     Packs/day: 1.00     Years: 50.00     Additional pack years: 0.00     Total pack years: 50.00     Types: Cigarettes     Start date: 1956     Quit date: 3/26/2005     Years since quittin.0    Smokeless tobacco: Never    Tobacco comments:     quit smoking about 6 yrs ago   Vaping Use    Vaping Use: Never used   Substance and Sexual Activity    Alcohol use: Yes     Alcohol/week: 2.0 standard drinks of alcohol     Types: 2 Glasses of wine per week    Drug use: Never   Other Topics Concern    Caffeine Concern No    Exercise Yes    Seat Belt  Yes    Special Diet No    Stress Concern No    Weight Concern No              Review of Systems    Positive for stated complaint: fever, weakness, body aches since this morning  Other systems are as noted in HPI.  Constitutional and vital signs reviewed.      All other systems reviewed and negative except as noted above.    Physical Exam     ED Triage Vitals [03/25/24 1355]   /65   Pulse 95   Resp 16   Temp 99 °F (37.2 °C)   Temp src Oral   SpO2 94 %   O2 Device None (Room air)       Current:/59   Pulse 85   Temp 98.9 °F (37.2 °C) (Oral)   Resp 21   Wt 74.8 kg   SpO2 94%   BMI 31.18 kg/m²         Physical Exam    Gen: Well appearing, well groomed  Neck: Supple, full range of motion, no thyromegaly or lymphadenopathy.  Eye examination: EOMs are intact, normal conjunctival  ENT: No injection noted to the bilateral auditory canals; no loss of landmarks. Normal nasal mucosa without audible nasal congestion.  Oropharynx is patent without evidence of erythema, exudates or deviation.  No stridor to auscultation  Lung: No distress, RR, no retraction, breath sounds are clear bilaterally.  Rare dry cough with coarse breath sound  Orthopedic: Generalized pain to palpation of the joints without erythema or induration.  Cardio: Regular rate and rhythm, normal S1-S2, no murmur appreciable  Skin: No sign of trauma, Skin warm and dry, no induration or sign of infection.  No rash noted      ED Course     Labs Reviewed   COMP METABOLIC PANEL (14) - Abnormal; Notable for the following components:       Result Value    Glucose 100 (*)     Albumin 2.7 (*)     Globulin  4.9 (*)     A/G Ratio 0.6 (*)     All other components within normal limits   LACTIC ACID, PLASMA - Abnormal; Notable for the following components:    Lactic Acid 2.7 (*)     All other components within normal limits   CBC W/ DIFFERENTIAL - Abnormal; Notable for the following components:    .0 (*)     Neutrophil Absolute Prelim 8.86 (*)      Neutrophil Absolute 8.86 (*)     Lymphocyte Absolute 0.44 (*)     All other components within normal limits   TROPONIN I HIGH SENSITIVITY - Normal   RAPID SARS-COV-2 BY PCR - Normal   POCT FLU TEST - Normal    Narrative:     This assay is a rapid molecular in vitro test utilizing nucleic acid amplification of influenza A and B viral RNA.   CBC WITH DIFFERENTIAL WITH PLATELET    Narrative:     The following orders were created for panel order CBC With Differential With Platelet.  Procedure                               Abnormality         Status                     ---------                               -----------         ------                     CBC W/ DIFFERENTIAL[[id number]]          Abnormal            Final result                 Please view results for these tests on the individual orders.   URINALYSIS, ROUTINE   PROCALCITONIN   LACTIC ACID REFLEX POST POSTIVE   BLOOD CULTURE   BLOOD CULTURE                      MDM        Arrives with a temperature 99.0.  No antipyretics provided prior to arrival.  Rare cough on exam.  Coarse breath sounds  Generalized pain to palpation of of joints without erythema or warmth.  Benign belly      CBC, CMP, urinalysis.  Procalcitonin.  Lactic acid.  Chest x-ray.  Influenza.  COVID.         My supervising physician was involved in the management of this patient.    Patient's lactic acid returned elevated 2.7.  Blood cultures added.  Troponin.  EKG.    Influenza and COVID are negative    CBC demonstrates no acute leukocytosis.  Stable hemoglobin.  Platelets of 146    CMP demonstrates a glucose of 100, albumin of 2.7.  Otherwise benign    Patient received IV steroids.  Slow fluids.  Responding well.    Possibility of pneumonia on chest x-ray    Awaiting urinalysis    Will plan on admitting the patient for further evaluation and care.  Please see supervising physician's documentation              Medical Decision Making      Disposition and Plan     Clinical Impression:  1.  Weakness generalized         Disposition:  There is no disposition on file for this visit.  There is no disposition time on file for this visit.    Follow-up:  No follow-up provider specified.        Medications Prescribed:  Current Discharge Medication List

## 2024-03-25 NOTE — H&P
[facility]  History and Physical     Carlos Howell Patient Status:  Emergency    3/26/1940 MRN [id number]   Location [facility] [place] Attending Diony Reynaga MD   Hosp Day # 0 PCP Simba Nunez MD     Chief Complaint: Generalized weakness, fever     Subjective:    History of Present Illness:     Carlos Howell is a 83 year old male with a PMH of CAD, RA, HTN who presents to the ER with generalized weakness and fever.  Patient states he has been weak for about 1 week.  Is also been having some pain in his shoulders which is chronic but worse than normal.  He had a fever today.  Denies any cough, no shortness of breath, no chest pain.  No recent nausea vomiting, diarrhea.  Patient recently stopped taking his prednisone.  He has no other acute complaints at this time.    History/Other:    Past Medical History:  Past Medical History:   Diagnosis Date    Arthritis     CAD (coronary artery disease)     s/p CABG    Current episode of major depressive disorder without prior episode 2021    Esophageal reflux     History of 2019 novel coronavirus disease (COVID-19) 12/15/2020    HTN (hypertension)     Rheumatoid arthritis (HCC)      Past Surgical History:   Past Surgical History:   Procedure Laterality Date    APPENDECTOMY      BYPASS SURGERY      REPAIR ING HERNIA,5+Y/O,REDUCIBL        Family History:   Family History   Problem Relation Age of Onset    Other (Other) Father      Social History:    reports that he quit smoking about 19 years ago. His smoking use included cigarettes. He started smoking about 68 years ago. He has a 50 pack-year smoking history. He has never used smokeless tobacco. He reports current alcohol use of about 2.0 standard drinks of alcohol per week. He reports that he does not use drugs.     Allergies: No Known Allergies    Medications:    No current facility-administered medications on file prior to encounter.     Current  Outpatient Medications on File Prior to Encounter   Medication Sig Dispense Refill    metoprolol tartrate 25 MG Oral Tab Take 1 tablet (25 mg total) by mouth daily. 90 tablet 1    predniSONE 2.5 MG Oral Tab Take 1 tablet (2.5 mg total) by mouth daily.      aspirin 81 MG Oral Tab EC Take 1 tablet (81 mg total) by mouth daily.      Hydroxychloroquine Sulfate (PLAQUENIL) 200 MG Oral Tab Take 1 tablet (200 mg total) by mouth daily.      erythromycin 5 MG/GM Ophthalmic Ointment Place 1 Application into both eyes As Directed. Post procedure as directed      rosuvastatin 5 MG Oral Tab Take 1 tablet (5 mg total) by mouth nightly. (Patient not taking: Reported on 3/25/2024) 90 tablet 1    Cholecalciferol (VITAMIN D) 50 MCG (2000 UT) Oral Tab Take 2,000 Units by mouth daily.      Esomeprazole Magnesium 40 MG Oral Capsule Delayed Release Take 1 capsule (40 mg total) by mouth daily as needed.         Review of Systems:   A comprehensive review of systems was completed.    Pertinent positives and negatives noted in the HPI.    Objective:   Physical Exam:    BP 95/62   Pulse 84   Temp 98.3 °F (36.8 °C) (Oral)   Resp 20   Wt 159 lb 11.2 oz (72.4 kg)   SpO2 93%   BMI 30.18 kg/m²   General: No acute distress, Alert, elderly, pleasant   Respiratory: No rhonchi, no wheezes  Cardiovascular: S1, S2. Regular rate and rhythm  Abdomen: Soft, Non-tender, non-distended, positive bowel sounds  Neuro: No new focal deficits  Extremities: No edema    Results:    Labs:      Labs Last 24 Hours:    Recent Labs   Lab 03/25/24  1428   RBC 5.01   HGB 13.4   HCT 41.9   MCV 83.6   MCH 26.7   MCHC 32.0   RDW 15.8   NEPRELIM 8.86*   WBC 10.2   .0*       Recent Labs   Lab 03/25/24  1428   *   BUN 17   CREATSERUM 1.07   EGFRCR 69   CA 8.7   ALB 2.7*      K 3.6      CO2 25.0   ALKPHO 92   AST 35   ALT 29   BILT 0.7   TP 7.6       Lab Results   Component Value Date    INR 1.14 05/21/2022       Recent Labs   Lab 03/25/24  1428    TROPHS 18       No results for input(s): \"TROP\", \"PBNP\" in the last 168 hours.    No results for input(s): \"PCT\" in the last 168 hours.    Imaging: Imaging data reviewed in Epic.    Assessment & Plan:      #Weakness and fever  #Pneumonia  Afebrile in ER, WBC 10.2  LA of 2.7, repeat lactic acid pending  EKG sinus, no acute st changes, Trop 18  CXR with bibasilar opacities appear to be scarring, similar appearance from xray June 2022  Started on Rocephin and Azithro in ER -> will continue  Sputum cultures    #Concern for adrenal insufficiency given chronic steroid use  Patient had stopped taking his prednisone 1 month ago per family  Last fill was noted to be dispensed in October and at 2.5mg  Was given 100mg solu-cortef in ER, will not continue at this time  Cortisol is back and elevated  Will resume solu-cortef if symptoms/BP change      #CAD s/p CABG - asa, metoprolol, statin  #Essential HTN - metoprolol  #HLD - statin  #Rheumatoid Arthritis - Plaquenil, has f/u appointment with rheum in May        Plan of care discussed with patient, er physician, nurse     Bala Rose MD    Supplementary Documentation:     The 21st Century Cures Act makes medical notes like these available to patients in the interest of transparency. Please be advised this is a medical document. Medical documents are intended to carry relevant information, facts as evident, and the clinical opinion of the practitioner. The medical note is intended as peer to peer communication and may appear blunt or direct. It is written in medical language and may contain abbreviations or verbiage that are unfamiliar.

## 2024-03-25 NOTE — ED QUICK NOTES
Pt's azithromyacin infusion completed.  Pt's daughter pointed out pt's face was flushed after infusion.  No hives, no report of SOB or throat tightness.  Bendryl 50 mg given IVP by ANA CARR.

## 2024-03-26 ENCOUNTER — ANESTHESIA EVENT (OUTPATIENT)
Dept: ENDOSCOPY | Facility: HOSPITAL | Age: 84
End: 2024-03-26
Payer: MEDICARE

## 2024-03-26 LAB
ACTH: 3.2 PG/ML
ANION GAP SERPL CALC-SCNC: 5 MMOL/L (ref 0–18)
BASOPHILS # BLD AUTO: 0.02 X10(3) UL (ref 0–0.2)
BASOPHILS NFR BLD AUTO: 0.2 %
BUN BLD-MCNC: 16 MG/DL (ref 9–23)
CALCIUM BLD-MCNC: 8.5 MG/DL (ref 8.5–10.1)
CHLORIDE SERPL-SCNC: 113 MMOL/L (ref 98–112)
CO2 SERPL-SCNC: 24 MMOL/L (ref 21–32)
CORTIS SERPL-MCNC: >75 UG/DL
CREAT BLD-MCNC: 0.94 MG/DL
EGFRCR SERPLBLD CKD-EPI 2021: 80 ML/MIN/1.73M2 (ref 60–?)
EOSINOPHIL # BLD AUTO: 0 X10(3) UL (ref 0–0.7)
EOSINOPHIL NFR BLD AUTO: 0 %
ERYTHROCYTE [DISTWIDTH] IN BLOOD BY AUTOMATED COUNT: 15.8 %
GLUCOSE BLD-MCNC: 98 MG/DL (ref 70–99)
HCT VFR BLD AUTO: 38.3 %
HGB BLD-MCNC: 12.4 G/DL
HGB BLD-MCNC: 12.5 G/DL
IMM GRANULOCYTES # BLD AUTO: 0.08 X10(3) UL (ref 0–1)
IMM GRANULOCYTES NFR BLD: 0.7 %
LACTATE SERPL-SCNC: 1.4 MMOL/L (ref 0.4–2)
LYMPHOCYTES # BLD AUTO: 1.07 X10(3) UL (ref 1–4)
LYMPHOCYTES NFR BLD AUTO: 9 %
MAGNESIUM SERPL-MCNC: 1.6 MG/DL (ref 1.6–2.6)
MAGNESIUM SERPL-MCNC: 1.7 MG/DL (ref 1.6–2.6)
MCH RBC QN AUTO: 26.7 PG (ref 26–34)
MCHC RBC AUTO-ENTMCNC: 32.4 G/DL (ref 31–37)
MCV RBC AUTO: 82.5 FL
MONOCYTES # BLD AUTO: 0.94 X10(3) UL (ref 0.1–1)
MONOCYTES NFR BLD AUTO: 7.9 %
NEUTROPHILS # BLD AUTO: 9.73 X10 (3) UL (ref 1.5–7.7)
NEUTROPHILS # BLD AUTO: 9.73 X10(3) UL (ref 1.5–7.7)
NEUTROPHILS NFR BLD AUTO: 82.2 %
OSMOLALITY SERPL CALC.SUM OF ELEC: 295 MOSM/KG (ref 275–295)
PLATELET # BLD AUTO: 113 10(3)UL (ref 150–450)
PLATELETS.RETICULATED NFR BLD AUTO: 5.9 % (ref 0–7)
POTASSIUM SERPL-SCNC: 3.7 MMOL/L (ref 3.5–5.1)
POTASSIUM SERPL-SCNC: 3.8 MMOL/L (ref 3.5–5.1)
PROCALCITONIN SERPL-MCNC: 1.37 NG/ML (ref ?–0.16)
RBC # BLD AUTO: 4.64 X10(6)UL
SODIUM SERPL-SCNC: 142 MMOL/L (ref 136–145)
WBC # BLD AUTO: 11.8 X10(3) UL (ref 4–11)

## 2024-03-26 PROCEDURE — 99232 SBSQ HOSP IP/OBS MODERATE 35: CPT | Performed by: INTERNAL MEDICINE

## 2024-03-26 RX ORDER — DOXYCYCLINE HYCLATE 100 MG/1
100 CAPSULE ORAL EVERY 12 HOURS SCHEDULED
Status: DISCONTINUED | OUTPATIENT
Start: 2024-03-26 | End: 2024-03-29

## 2024-03-26 RX ORDER — MAGNESIUM OXIDE 400 MG/1
400 TABLET ORAL ONCE
Status: COMPLETED | OUTPATIENT
Start: 2024-03-26 | End: 2024-03-26

## 2024-03-26 RX ORDER — DIPHENHYDRAMINE HCL 25 MG
25 CAPSULE ORAL EVERY 6 HOURS PRN
Status: DISCONTINUED | OUTPATIENT
Start: 2024-03-26 | End: 2024-03-29

## 2024-03-26 NOTE — DIETARY NOTE
[facility]   part of [facility]   CLINICAL NUTRITION    Carlos Howell     Admitting diagnosis:  Weakness generalized [R53.1]    Ht: 162.6 cm (5' 4\")  Wt: 72.1 kg (159 lb).   Body mass index is 27.29 kg/m².  IBW: 59 kg    Wt Readings from Last 6 Encounters:   03/25/24 72.1 kg (159 lb)   11/13/23 70.8 kg (156 lb)   10/16/23 70.3 kg (155 lb)   04/03/23 69.9 kg (154 lb)   07/15/22 67.6 kg (149 lb)   06/15/22 66.2 kg (146 lb)        Labs/Meds   -Abx, cholecalciferol, Mag-ox, protonix,     Diet:       Procedures    Regular/General diet Is Patient on Accuchecks? No; Misc Restriction: Cardiac    NPO     Percent Meals Eaten (last 3 days)       Date/Time Percent Meals Eaten (%)    03/26/24 0753 60 %              Pt chart reviewed d/t MST 2.  Patient reports good appetite at this time.  Nursing notes reports Percent Meals Eaten (%): 60 % intake for last meal.  Tolerating po diet without diarrhea, emesis, or constipation.   No significant weight changes noted.     PMH includes AD, RA, HTN. Pt A&O x3, can be forgetful. Noted pt w/ soft dark tarry stools. GI consulted. Eating well at this time. No n/v/d. Will continue to monitor.     Patient is at low nutrition risk at this time.    Please consult if patient status changes or nutrition issues arise.    Siobhan Nazario  Dietetic intern

## 2024-03-26 NOTE — PHYSICAL THERAPY NOTE
PHYSICAL THERAPY EVALUATION - INPATIENT     Room Number: 505/505-A  Evaluation Date: 3/26/2024  Type of Evaluation: Initial  Physician Order: PT Eval and Treat    Presenting Problem: Generalized weakness  Co-Morbidities : ILD, falls, HTN, CAD, CABG, rheumatoid arthritis  Reason for Therapy: Mobility Dysfunction and Discharge Planning    PHYSICAL THERAPY ASSESSMENT   Patient is currently functioning near baseline with bed mobility, transfers, and gait.  Prior to admission, patient's baseline is supervision for gait w/ rolling walker and assist for ADL's.  Patient is requiring minimal assist as a result of the following impairments: decreased functional strength, decreased endurance/aerobic capacity, decreased muscular endurance, and cognitive deficits (mild forgetfulness).  Physical Therapy will continue to follow for duration of hospitalization.    Patient will benefit from continued skilled PT Services at discharge to promote functional independence in home.  Anticipate patient will return home with home health PT.    PLAN  PT Treatment Plan: Bed mobility;Patient education;Family education;Gait training;Strengthening;Transfer training;Balance training  Rehab Potential : Good  Frequency (Obs): 3-5x/week  Number of Visits to Meet Established Goals: 5      CURRENT GOALS    Goal #1 Patient is able to demonstrate supine - sit EOB @ level: supervision     Goal #2 Patient is able to demonstrate transfers EOB to/from BSC at assistance level: supervision     Goal #3 Patient is able to ambulate 40 feet with assist device: walker - rolling at assistance level: contact guard assist     Goal #4 Up and down a stoop w/ min a of 1.   Goal #5    Goal #6    Goal Comments: Goals established on 3/26/2024      PHYSICAL THERAPY MEDICAL/SOCIAL HISTORY  History related to current admission: Patient is a 84 year old male admitted on 3/25/2024 from home for difficulty walking, weakness and cough.  Pt diagnosed with generalized  weakness.      HOME SITUATION  Type of Home: House   Home Layout: One level;Able to live on main level  Stairs to Enter : 1  Railing: No  Stairs to Bedroom: 0       Lives With: Spouse;Daughter;Family (Son in law and 2 grandkids)  Drives: No     Patient Regularly Uses: None    Prior Level of [place]: Pt typically walks household distances w/ rolling walker.  Wife assists w/ dressing and showering due to pt's severe rheumatoid arthritis.  Wife or family drive pt when necessary.    SUBJECTIVE  \"I have no trouble at home.\" - referring to getting out of bed and walking.      OBJECTIVE  Precautions: Bed/chair alarm  Fall Risk: High fall risk    WEIGHT BEARING RESTRICTION  Weight Bearing Restriction: None                PAIN ASSESSMENT  Rating: Unable to rate  Location: R knee and R shoulder w/ movement  Management Techniques: Activity promotion;Repositioning    COGNITION  Overall Cognitive Status:  WFL - within functional limits  Mildly forgetful at times    RANGE OF MOTION AND STRENGTH ASSESSMENT  Upper extremity ROM and strength -see OT evaluation  Pt w/ very restricted active shoulder range - only about 10 degrees of elevation, passively greater than 90 degrees - L is more painful than R.    Lower extremity ROM is within functional limits     Lower extremity strength is within functional limits - 4-/5 throughout      BALANCE  Static Sitting: Fair  Dynamic Sitting: Fair -  Static Standing: Poor +  Dynamic Standing: Poor +    ADDITIONAL TESTS                                    ACTIVITY TOLERANCE  Pulse: 77  Heart Rate Source: Monitor                   O2 WALK  Oxygen Therapy  SPO2% on Room Air at Rest: 94    NEUROLOGICAL FINDINGS                        AM-PAC '6-Clicks' INPATIENT SHORT FORM - BASIC MOBILITY  How much difficulty does the patient currently have...  Patient Difficulty: Turning over in bed (including adjusting bedclothes, sheets and blankets)?: None   Patient Difficulty: Sitting down on and standing up  from a chair with arms (e.g., wheelchair, bedside commode, etc.): A Little   Patient Difficulty: Moving from lying on back to sitting on the side of the bed?: A Little   How much help from another person does the patient currently need...   Help from Another: Moving to and from a bed to a chair (including a wheelchair)?: A Little   Help from Another: Need to walk in hospital room?: A Little   Help from Another: Climbing 3-5 steps with a railing?: A Lot       AM-PAC Score:  Raw Score: 18   Approx Degree of Impairment: 46.58%   Standardized Score (AM-PAC Scale): 43.63   CMS Modifier (G-Code): CK    FUNCTIONAL ABILITY STATUS  Gait Assessment   Functional Mobility/Gait Assessment  Gait Assistance: Contact guard assist  Distance (ft): 18  Assistive Device: Rolling walker  Pattern: Shuffle (Flexed trunk)    Skilled Therapy Provided     Bed Mobility:  Rolling: Right and left w/ hob flat - mod I  Supine to sit: Min a of 1 for trunk to achieve upright posture.   Sit to supine: NT     Transfer Mobility:  Sit to stand: Cues for proper hand placement, Min a of 1 on 1st attempt from eob, CGA from bedside chair w/ bilateral armrests.   Stand to sit: CGA w/ cues for safety technique.  Gait = Completed gait 18'x1 w/ rw and CGA. Pt w/ moderate kyphosis, shuffling gait pattern and tendency to push rw to his right while shuffling his le's slightly to L outside frame of walker.  Needs cues to correct and center himself w/I the walker.  Feel pt probably manages this better at home w/ rollator which has swivel wheels and is easier to navigate.    Therapist's Comments: Pt comfortable in bedside recliner w/ alarm on at end of session.    Exercise/Education Provided:  Bed mobility  Functional activity tolerated  Gait training  Transfer training    Patient End of Session: Up in chair;Needs met;Call light within reach;RN aware of session/findings;All patient questions and concerns addressed;Alarm set (Rns Lucrecia and Subha aware of eval  session)      Patient Evaluation Complexity Level:  History Moderate - 1 or 2 personal factors and/or co-morbidities   Examination of body systems Moderate - addressing a total of 3 or more elements   Clinical Presentation Low - Stable   Clinical Decision Making Low - Stable       PT Session Time: 28 minutes  Gait Training: 10 minutes  Therapeutic Activity: 7 minutes  Neuromuscular Re-education: 0 minutes  Therapeutic Exercise: 0 minutes

## 2024-03-26 NOTE — CONSULTS
[facility]  GASTROENTEROLOGY NEW CONSULT NOTE   [facility]     Carlos Howell Patient Status:  Inpatient    3/26/1940 MRN [id number]   Location [facility] 5NW-A Attending James Nunez,    Hosp Day # 1 PCP Simba Nunez MD       Reason for Consultation:   Melena     History of Present Illness (HPI):  Carlos Howell is a 84 year old male with a PMH of CAD s/p CABG, with GI consult for melena. Patient admitted yesterday for generalized weakness. Overnight and this am had melena.    He denies any nausea and vomiting. No abd pain.   No BRBPR; denies any dysphagia.   Had breakfast and lunch today.     No anti-thrombotic agents.     Past Medical History:  Past Medical History:   Diagnosis Date    Arthritis     CAD (coronary artery disease)     s/p CABG    Current episode of major depressive disorder without prior episode 2021    Esophageal reflux     History of 2019 novel coronavirus disease (COVID-19) 12/15/2020    HTN (hypertension)     Rheumatoid arthritis (HCC)        Past Surgical History:  Past Surgical History:   Procedure Laterality Date    APPENDECTOMY      BYPASS SURGERY      REPAIR ING HERNIA,5+Y/O,REDUCIBL         Family History:  No first-degree relative with a history of colorectal cancer.    Social History:  No IVDU      Medications:    ampicillin-sulbactam (Unasyn) 3 g in sodium chloride 0.9% 100mL IVPB-ADD  3 g Intravenous q6h    [COMPLETED] magnesium oxide (Mag-Ox) tab 400 mg  400 mg Oral Once    pantoprazole (Protonix) 40 mg in sodium chloride 0.9% PF 10 mL IV push  40 mg Intravenous Q12H    doxycycline (Vibramycin) cap 100 mg  100 mg Oral 2 times per day    diphenhydrAMINE (Benadryl) cap/tab 25 mg  25 mg Oral Q6H PRN    [COMPLETED] sodium chloride 0.9 % IV bolus 500 mL  500 mL Intravenous Once    [COMPLETED] acetaminophen (Tylenol Extra Strength) tab 1,000 mg  1,000 mg Oral Once    [COMPLETED] hydrocortisone Na succinate PF (Solu-CORTEF)  injection 100 mg  100 mg Intravenous Once    [COMPLETED] cefTRIAXone (Rocephin) 2 g in D5W 100 mL IVPB-ADD  2 g Intravenous Once    [COMPLETED] azithromycin (Zithromax) 500 mg in sodium chloride 0.9% 250 mL IVPB  500 mg Intravenous Once    [COMPLETED] sodium chloride 0.9% infusion        [COMPLETED] azithromycin (Zithromax) 500 MG injection        [COMPLETED] diphenhydrAMINE (Benadryl) 50 mg/mL  injection 50 mg  50 mg Intravenous Once    acetaminophen (Tylenol Extra Strength) tab 500 mg  500 mg Oral Q4H PRN    melatonin tab 3 mg  3 mg Oral Nightly PRN    glycerin-hypromellose- (Artifical Tears) 0.2-0.2-1 % ophthalmic solution 1 drop  1 drop Both Eyes QID PRN    sodium chloride (Saline Mist) 0.65 % nasal solution 1 spray  1 spray Each Nare Q3H PRN    [Held by provider] enoxaparin (Lovenox) 40 MG/0.4ML SUBQ injection 40 mg  40 mg Subcutaneous Daily    polyethylene glycol (PEG 3350) (Miralax) 17 g oral packet 17 g  17 g Oral Daily PRN    sennosides (Senokot) tab 17.2 mg  17.2 mg Oral Nightly PRN    bisacodyl (Dulcolax) 10 MG rectal suppository 10 mg  10 mg Rectal Daily PRN    fleet enema (Fleet) 7-19 GM/118ML rectal enema 133 mL  1 enema Rectal Once PRN    ondansetron (Zofran) 4 MG/2ML injection 4 mg  4 mg Intravenous Q6H PRN    metoclopramide (Reglan) 5 mg/mL injection 5 mg  5 mg Intravenous Q8H PRN    [Held by provider] aspirin DR tab 81 mg  81 mg Oral Daily    rosuvastatin (Crestor) tab 5 mg  5 mg Oral Nightly    metoprolol tartrate (Lopressor) tab 25 mg  25 mg Oral Daily    cholecalciferol tab 2,000 Units  2,000 Units Oral Daily    hydroxychloroquine (Plaquenil) tab 200 mg  200 mg Oral Daily    HYDROcodone-acetaminophen (Norco) 5-325 MG per tab 1 tablet  1 tablet Oral Q6H PRN       Allergies:  No Known Allergies    Review of Systems  Negative unless otherwise mentioned in HPI.     Physical Exam  /61 (BP Location: Right arm)   Pulse 78   Temp 97.5 °F (36.4 °C) (Oral)   Resp 18   Ht 5' 4\" (1.626 m)    Wt 159 lb (72.1 kg)   SpO2 94%   BMI 27.29 kg/m²   Body mass index is 27.29 kg/m².        Gen: Awake and alert, NAD  Cardiovascular: Warm, well-perfused  Respiratory: No respiratory distress  Abd: Soft, non-tender, non-distended. No rebound tenderness, no guarding.  Neuro:  Alert and oriented to name, location and time.     Diagnostic Data:      Labs:  Recent Labs   Lab 03/25/24  1428 03/26/24  0628   WBC 10.2 11.8*   HGB 13.4 12.4*   MCV 83.6 82.5   .0* 113.0*       Recent Labs   Lab 03/25/24  1428 03/26/24  0628   * 98   BUN 17 16   CREATSERUM 1.07 0.94   CA 8.7 8.5   ALB 2.7*  --     142   K 3.6 3.8    113*   CO2 25.0 24.0   ALKPHO 92  --    AST 35  --    ALT 29  --    BILT 0.7  --    TP 7.6  --        No results for input(s): \"PTP\", \"INR\" in the last 168 hours.           Imaging: Imaging data reviewed in Epic.    Medications:    ampicillin-sulbactam  3 g Intravenous q6h    pantoprazole  40 mg Intravenous Q12H    doxycycline  100 mg Oral 2 times per day    [Held by provider] enoxaparin  40 mg Subcutaneous Daily    [Held by provider] aspirin  81 mg Oral Daily    rosuvastatin  5 mg Oral Nightly    metoprolol tartrate  25 mg Oral Daily    cholecalciferol  2,000 Units Oral Daily    hydroxychloroquine  200 mg Oral Daily       Allergies:  No Known Allergies    Imaging:  I have personally reviewed all pertinent available imaging.       Informed Consent:   The planned procedure(s), the explanation of the procedure, its expected benefits, the potential complications and risks, and possible alternatives (including their benefits and risks) were discussed with the patient in full. The discussion of risks, not limited to but including bleeding, infection, perforation or tear in the gastrointestinal tract,  adverse effects from anesthesia, and possible prolonged hospitalization, emergency surgery, risk of morbidity or mortality, and risk of missed lesion(s) were discussed with patient. Patient  understood the proposed procedure(s), and elected to proceed with the procedure(s) with possible intervention (such as polypectomy, biopsy, control of bleeding, etc.) and its risks, benefits and alternatives and wish to proceed with procedure(s). All questions answered in full to patient's satisfaction. Ample time was given to patient to ask all questions in full.       ASSESSMENT / PLAN:     Carlos Howell is a 84 year old male with GI consult for melena. Drop in Hg, melena. Ddx includes PUD, gastritis, UGI AVM. Pt ate lunch today. NPO at MN      Recommendations:   Plan for EGD tomorrow  NPO at MN  PPI IV q12 hours  IVF, analgesia, anti-emetics per primary team    D/w pt's daughter Henna and daughter in law, Jodie. Both are agreeable to above plan     Lion Aguilera MD  3/26/2024  [facility]

## 2024-03-26 NOTE — PLAN OF CARE
Patient alert and oriented X 3, forgetful at times. Tele-NSR. Patient on room air, saturating WNL. Patient with 1 tarry stool this morning, MD made aware. GI placed on consult, made aware. Patient to get EGD tomorrow. Patient on IV antibiotics, IV fluids. Patient incontinent of urine, male external catheter and brief in place. Patient worked with PT, ambulated with 1 assist with walker. Patient reports arthritic pain to hands, PRN pain medication given per MAR. Safety precautions in place. Patient and family updated on plan of care, all questions answered, patient and family verbalized understanding.     Problem: Patient/Family Goals  Goal: Patient/Family Long Term Goal  Description: Patient's Long Term Goal: discharge home    Interventions:  - PT/OT  - IV Abx  -GI consult  - See additional Care Plan goals for specific interventions  Outcome: Progressing  Goal: Patient/Family Short Term Goal  Description: Patient's Short Term Goal: pain management     Interventions:   - IV Abx, IVF  -pain medication as needed  - PT/OT  - See additional Care Plan goals for specific interventions  Outcome: Progressing     Problem: PAIN - ADULT  Goal: Verbalizes/displays adequate comfort level or patient's stated pain goal  Description: INTERVENTIONS:  - Encourage pt to monitor pain and request assistance  - Assess pain using appropriate pain scale  - Administer analgesics based on type and severity of pain and evaluate response  - Implement non-pharmacological measures as appropriate and evaluate response  - Consider cultural and social influences on pain and pain management  - Manage/alleviate anxiety  - Utilize distraction and/or relaxation techniques  - Monitor for opioid side effects  - Notify MD/LIP if interventions unsuccessful or patient reports new pain  - Anticipate increased pain with activity and pre-medicate as appropriate  Outcome: Progressing     Problem: RISK FOR INFECTION - ADULT  Goal: Absence of fever/infection  during anticipated neutropenic period  Description: INTERVENTIONS  - Monitor WBC  - Administer growth factors as ordered  - Implement neutropenic guidelines  Outcome: Progressing     Problem: SAFETY ADULT - FALL  Goal: Free from fall injury  Description: INTERVENTIONS:  - Assess pt frequently for physical needs  - Identify cognitive and physical deficits and behaviors that affect risk of falls.  - Alfred fall precautions as indicated by assessment.  - Educate pt/family on patient safety including physical limitations  - Instruct pt to call for assistance with activity based on assessment  - Modify environment to reduce risk of injury  - Provide assistive devices as appropriate  - Consider OT/PT consult to assist with strengthening/mobility  - Encourage toileting schedule  Outcome: Progressing     Problem: DISCHARGE PLANNING  Goal: Discharge to home or other facility with appropriate resources  Description: INTERVENTIONS:  - Identify barriers to discharge w/pt and caregiver  - Include patient/family/discharge partner in discharge planning  - Arrange for needed discharge resources and transportation as appropriate  - Identify discharge learning needs (meds, wound care, etc)  - Arrange for interpreters to assist at discharge as needed  - Consider post-discharge preferences of patient/family/discharge partner  - Complete POLST form as appropriate  - Assess patient's ability to be responsible for managing their own health  - Refer to Case Management Department for coordinating discharge planning if the patient needs post-hospital services based on physician/LIP order or complex needs related to functional status, cognitive ability or social support system  Outcome: Progressing     Problem: GASTROINTESTINAL - ADULT  Goal: Minimal or absence of nausea and vomiting  Description: INTERVENTIONS:  - Maintain adequate hydration with IV or PO as ordered and tolerated  - Nasogastric tube to low intermittent suction as  ordered  - Evaluate effectiveness of ordered antiemetic medications  - Provide nonpharmacologic comfort measures as appropriate  - Advance diet as tolerated, if ordered  - Obtain nutritional consult as needed  - Evaluate fluid balance  Outcome: Progressing  Goal: Maintains or returns to baseline bowel function  Description: INTERVENTIONS:  - Assess bowel function  - Maintain adequate hydration with IV or PO as ordered and tolerated  - Evaluate effectiveness of GI medications  - Encourage mobilization and activity  - Obtain nutritional consult as needed  - Establish a toileting routine/schedule  - Consider collaborating with pharmacy to review patient's medication profile  Outcome: Progressing  Goal: Maintains adequate nutritional intake (undernourished)  Description: INTERVENTIONS:  - Monitor percentage of each meal consumed  - Identify factors contributing to decreased intake, treat as appropriate  - Assist with meals as needed  - Monitor I&O, WT and lab values  - Obtain nutritional consult as needed  - Optimize oral hygiene and moisture  - Encourage food from home; allow for food preferences  - Enhance eating environment  Outcome: Progressing

## 2024-03-26 NOTE — CM/SW NOTE
03/26/24 1500   CM/SW Referral Data   Referral Source Social Work (self-referral)   Reason for Referral Discharge planning   Informant Patient;EMR   Discharge Needs   Anticipated D/C needs Home health care     HOME SITUATION  Type of Home: House  Home Layout: One level;Able to live on main level  Lives With: Spouse;Daughter;Family (Son in law and 2 grandkids)     Toilet and Equipment: Standard height toilet  Shower/Tub and Equipment: Tub-shower combo  Other Equipment: Other (Comment) (rollator)     Occupation/Status: Retired   Hand Dominance: Right  Drives: No  Patient Regularly Uses: None     Prior Level of Function: Pt reports that his wife assist him with most things at home. He lives with dtr and her family. Per PT note, pt stated that he is amb with rollator at home at all times. Wife assist with dressing  and bathing. Lives with daughter and her family. Had HHPT in the past   (Per OT evaluation)      Patient is an 83 y/o male who admitted with Weakness generalized. PT recommending HH. Patient noted to have history with [facility]. Sent referrals in aidin. Awaiting accepting provider and patient choice. SW will remain available.     JALIL Mayfield  Discharge Planner  [phone]

## 2024-03-26 NOTE — ANESTHESIA PREPROCEDURE EVALUATION
PRE-OP EVALUATION    Patient Name: Carlos Howell    Admit Diagnosis: Weakness generalized [R53.1]    Pre-op Diagnosis: INPT    ESOPHAGOGASTRODUODENOSCOPY (EGD)    Anesthesia Procedure: ESOPHAGOGASTRODUODENOSCOPY (EGD)    Surgeon(s) and Role:     * Lion Aguilera MD - Primary    Pre-op vitals reviewed.  Temp: 97.5 °F (36.4 °C)  Pulse: 78  Resp: 18  BP: 121/61  SpO2: 94 %  Body mass index is 27.29 kg/m².    Current medications reviewed.  Hospital Medications:  • ampicillin-sulbactam (Unasyn) 3 g in sodium chloride 0.9% 100mL IVPB-ADD  3 g Intravenous q6h   • [COMPLETED] magnesium oxide (Mag-Ox) tab 400 mg  400 mg Oral Once   • pantoprazole (Protonix) 40 mg in sodium chloride 0.9% PF 10 mL IV push  40 mg Intravenous Q12H   • doxycycline (Vibramycin) cap 100 mg  100 mg Oral 2 times per day   • diphenhydrAMINE (Benadryl) cap/tab 25 mg  25 mg Oral Q6H PRN   • [COMPLETED] sodium chloride 0.9 % IV bolus 500 mL  500 mL Intravenous Once   • [COMPLETED] acetaminophen (Tylenol Extra Strength) tab 1,000 mg  1,000 mg Oral Once   • [COMPLETED] hydrocortisone Na succinate PF (Solu-CORTEF) injection 100 mg  100 mg Intravenous Once   • [COMPLETED] cefTRIAXone (Rocephin) 2 g in D5W 100 mL IVPB-ADD  2 g Intravenous Once   • [COMPLETED] azithromycin (Zithromax) 500 mg in sodium chloride 0.9% 250 mL IVPB  500 mg Intravenous Once   • [COMPLETED] sodium chloride 0.9% infusion       • [COMPLETED] azithromycin (Zithromax) 500 MG injection       • [COMPLETED] diphenhydrAMINE (Benadryl) 50 mg/mL  injection 50 mg  50 mg Intravenous Once   • acetaminophen (Tylenol Extra Strength) tab 500 mg  500 mg Oral Q4H PRN   • melatonin tab 3 mg  3 mg Oral Nightly PRN   • glycerin-hypromellose- (Artifical Tears) 0.2-0.2-1 % ophthalmic solution 1 drop  1 drop Both Eyes QID PRN   • sodium chloride (Saline Mist) 0.65 % nasal solution 1 spray  1 spray Each Nare Q3H PRN   • [Held by provider] enoxaparin (Lovenox) 40 MG/0.4ML SUBQ injection 40 mg   40 mg Subcutaneous Daily   • polyethylene glycol (PEG 3350) (Miralax) 17 g oral packet 17 g  17 g Oral Daily PRN   • sennosides (Senokot) tab 17.2 mg  17.2 mg Oral Nightly PRN   • bisacodyl (Dulcolax) 10 MG rectal suppository 10 mg  10 mg Rectal Daily PRN   • fleet enema (Fleet) 7-19 GM/118ML rectal enema 133 mL  1 enema Rectal Once PRN   • ondansetron (Zofran) 4 MG/2ML injection 4 mg  4 mg Intravenous Q6H PRN   • metoclopramide (Reglan) 5 mg/mL injection 5 mg  5 mg Intravenous Q8H PRN   • [Held by provider] aspirin DR tab 81 mg  81 mg Oral Daily   • rosuvastatin (Crestor) tab 5 mg  5 mg Oral Nightly   • metoprolol tartrate (Lopressor) tab 25 mg  25 mg Oral Daily   • cholecalciferol tab 2,000 Units  2,000 Units Oral Daily   • hydroxychloroquine (Plaquenil) tab 200 mg  200 mg Oral Daily   • HYDROcodone-acetaminophen (Norco) 5-325 MG per tab 1 tablet  1 tablet Oral Q6H PRN       Outpatient Medications:     Medications Prior to Admission   Medication Sig Dispense Refill Last Dose   • rosuvastatin 5 MG Oral Tab Take 1 tablet (5 mg total) by mouth nightly.   3/24/2024   • metoprolol tartrate 25 MG Oral Tab Take 1 tablet (25 mg total) by mouth daily. 90 tablet 1 3/24/2024 at 0900   • Cholecalciferol (VITAMIN D) 50 MCG (2000 UT) Oral Tab Take 2,000 Units by mouth daily.   3/24/2024 at 0900   • aspirin 81 MG Oral Tab EC Take 1 tablet (81 mg total) by mouth daily.   3/24/2024 at 0900   • Hydroxychloroquine Sulfate (PLAQUENIL) 200 MG Oral Tab Take 1 tablet (200 mg total) by mouth daily.   3/24/2024   • erythromycin 5 MG/GM Ophthalmic Ointment Place 1 Application into both eyes As Directed. Post procedure as directed (Patient not taking: Reported on 3/25/2024)   Not Taking   • rosuvastatin 5 MG Oral Tab Take 1 tablet (5 mg total) by mouth nightly. 90 tablet 1    • predniSONE 2.5 MG Oral Tab Take 1 tablet (2.5 mg total) by mouth daily. (Patient not taking: Reported on 3/25/2024)   Not Taking   • Esomeprazole Magnesium 40 MG  Oral Capsule Delayed Release Take 1 capsule (40 mg total) by mouth daily as needed. (Patient not taking: Reported on 3/25/2024)   Not Taking       Allergies: Patient has no known allergies.      Anesthesia Evaluation    Patient summary reviewed.    Anesthetic Complications           GI/Hepatic/Renal  Comment: melena    (+) GERD                           Cardiovascular      ECG reviewed.            (+) hypertension     (+) CAD    (+) CABG/stent                            Endo/Other               (+) anemia            (+) arthritis       Pulmonary    Negative pulmonary ROS.                       Neuro/Psych    Negative neuro/psych ROS.                                Past Surgical History:   Procedure Laterality Date   • APPENDECTOMY     • BYPASS SURGERY     • REPAIR ING HERNIA,5+Y/O,REDUCIBL       Social History     Socioeconomic History   • Marital status:    Tobacco Use   • Smoking status: Former     Packs/day: 1.00     Years: 50.00     Additional pack years: 0.00     Total pack years: 50.00     Types: Cigarettes     Start date: 1956     Quit date: 3/26/2005     Years since quittin.0   • Smokeless tobacco: Never   • Tobacco comments:     quit smoking about 6 yrs ago   Vaping Use   • Vaping Use: Never used   Substance and Sexual Activity   • Alcohol use: Yes     Alcohol/week: 2.0 standard drinks of alcohol     Types: 2 Glasses of wine per week   • Drug use: Never   Other Topics Concern   • Caffeine Concern No   • Exercise Yes   • Seat Belt Yes   • Special Diet No   • Stress Concern No   • Weight Concern No     History   Drug Use Unknown     Available pre-op labs reviewed.  Lab Results   Component Value Date    WBC 11.8 (H) 2024    RBC 4.64 2024    HGB 12.5 (L) 2024    HCT 38.3 (L) 2024    MCV 82.5 2024    MCH 26.7 2024    MCHC 32.4 2024    RDW 15.8 2024    .0 (L) 2024     Lab Results   Component Value Date     2024    K 3.8  03/26/2024     (H) 03/26/2024    CO2 24.0 03/26/2024    BUN 16 03/26/2024    CREATSERUM 0.94 03/26/2024    GLU 98 03/26/2024    CA 8.5 03/26/2024            Airway      Mallampati: II  Mouth opening: 3 FB  TM distance: 4 - 6 cm  Neck ROM: full Cardiovascular    Cardiovascular exam normal.  Rhythm: regular  Rate: normal     Dental             Pulmonary    Pulmonary exam normal.                 Other findings          ASA: 3   Plan: MAC                           Present on Admission:  **None**

## 2024-03-26 NOTE — OCCUPATIONAL THERAPY NOTE
OCCUPATIONAL THERAPY EVALUATION - INPATIENT     Room Number: 505/505-A  Evaluation Date: 3/26/2024  Type of Evaluation: Initial  Presenting Problem: weakness, fever, PNA    Physician Order: IP Consult to Occupational Therapy  Reason for Therapy: ADL/IADL Dysfunction and Discharge Planning    OCCUPATIONAL THERAPY ASSESSMENT   Patient is currently functioning near baseline with toileting, bathing, upper body dressing, lower body dressing, bed mobility, and transfers. Prior to admission, patient's baseline is requiring assist with showers and dressing and using a rollator.  Patient is requiring minimal assist and moderate assist as a result of the following impairments: decreased functional strength, decreased functional reach, decreased endurance, and medical status. Occupational Therapy will continue to follow for duration of hospitalization.    Patient will benefit from continued skilled OT Services at discharge to promote functional independence in home.  Anticipate patient will return home with home health OT      History Related to Current Admission: Patient is a 84 year old male admitted on 3/25/2024 with Presenting Problem: weakness, fever, PNA. Co-Morbidities : ILD, falls, HTN, CAD, CABG, rheumatoid arthritis    Recommendations for nursing staff:   Transfers: min A with RW  Toileting location: bathroom toilet     EVALUATION SESSION  Patient Start of Session: Pt was found in his bed. Pt requiring nursing assist for jagdish-care and depends management in bed.   FUNCTIONAL TRANSFER ASSESSMENT  Sit to Stand: Edge of Bed  Edge of Bed: Minimal Assist    BED MOBILITY  Supine to Sit : Minimal Assist    BALANCE ASSESSMENT     FUNCTIONAL ADL ASSESSMENT       ACTIVITY TOLERANCE:                          O2 SATURATIONS       Cognition  Decreased STM  Follows simple commands      Upper extremity  WFL    EDUCATION PROVIDED  Patient: Role of Occupational Therapy; Plan of Care; Discharge Recommendations; Functional Transfer  Techniques; Fall Prevention  Patient's Response to Education: Verbalized Understanding      Equipment used: RW, gait belt   Demonstrates functional use, Would benefit from additional trial     Therapist comments: supine>sit EOB>stand to RW>walk within hallway>sitting in chair. Pt kept RW out to L side, he required min A for RW negotiation to stay inside his walker.     Patient End of Session: Up in chair;Needs met;Call light within reach;RN aware of session/findings;All patient questions and concerns addressed;Alarm set    OCCUPATIONAL PROFILE    HOME SITUATION  Type of Home: House  Home Layout: One level;Able to live on main level  Lives With: Spouse;Daughter;Family (Son in law and 2 grandkids)    Toilet and Equipment: Standard height toilet  Shower/Tub and Equipment: Tub-shower combo  Other Equipment: Other (Comment) (rollator)    Occupation/Status: Retired   Hand Dominance: Right  Drives: No  Patient Regularly Uses: None    Prior Level of Function: Pt reports that his wife assist him with most things at home. He lives with dtr and her family. Per PT note, pt stated that he is amb with rollator at home at all times. Wife assist with dressing  and bathing. Lives with daughter and her family. Had HHPT in the past     SUBJECTIVE   \"My mind... this is why I stopped driving...\"     PAIN ASSESSMENT  Ratin  Location: Pt denies pain       OBJECTIVE  Precautions: Bed/chair alarm  Fall Risk: High fall risk    WEIGHT BEARING RESTRICTION  Weight Bearing Restriction: None                ASSESSMENTS    AM-PAC ‘6-Clicks’ Inpatient Daily Activity Short Form  -   Putting on and taking off regular lower body clothing?: A Lot  -   Bathing (including washing, rinsing, drying)?: A Lot  -   Toileting, which includes using toilet, bedpan or urinal? : A Little  -   Putting on and taking off regular upper body clothing?: A Little  -   Taking care of personal grooming such as brushing teeth?: A Little  -   Eating meals?:  None    AM-PAC Score:  Score: 17  Approx Degree of Impairment: 50.11%  Standardized Score (AM-PAC Scale): 37.26    PLAN  OT Treatment Plan: Balance activities;ADL training;Energy conservation/work simplification techniques;Functional transfer training;Endurance training;Patient/Family education;Patient/Family training;Equipment eval/education;Compensatory technique education;Continued evaluation  Rehab Potential : Fair  Frequency: 3x/week  Number of Visits to Meet Established Goals: 3    ADL Goals  Patient will perform toileting with supervision and AE PRN  Patient will perform LB dressing with min A and AE PRN    Functional Transfer Goals  Patient will perform bed mobility supine to sit with supervision  Patient will perform bed mobility sit to supine with supervision  Patient will perform toilet transfer with supervision        Patient Evaluation Complexity Level:   Occupational Profile/Medical History MODERATE - Expanded review of history including review of medical or therapy record   Specific performance deficits impacting engagement in ADL/IADL MODERATE  3 - 5 performance deficits   Client Assessment/Performance Deficits MODERATE - Comorbidities and min to mod modifications of tasks    Clinical Decision Making MODERATE - Analysis of occupational profile, detailed assessments, several treatment options    Overall Complexity MODERATE     OT Session Time: 30 minutes  Therapeutic Activity: 15 minutes

## 2024-03-26 NOTE — PROGRESS NOTES
NURSING ADMISSION NOTE    Patient alert and oriented x 2, confused, forgetful. Tele- NSR. Patient on room air, saturating WNL. IV fluids restarted. Redness to sacrum noted, mepilex applied. Male external catheter applied. Patient reports chronic pain to hands related to his rheumatoid arthritis. Denies shortness of breath, nausea. Attempted to call Henna, patient's daughter, to assist with completion of admission navigator with no answer, message left with callback number. Spoke to patient's son, Isaias, completed partial admission navigator with son's assistance. Son reports Henna will need to assist with PTA med rec. Son reports he will notify sister to call to assist with completion of admission navigator. Night shift RN made aware. Patient and son updated on plan of care, all questions answered. Patient and son verbalized understanding.    Per son, patient is from home with daughter, daughters family, and patient's wife.     Patient admitted via Ambulance  Oriented to room.  Safety precautions initiated.  Bed in low position.  Call light in reach.

## 2024-03-26 NOTE — PROGRESS NOTES
[facility]   part of [facility]     Hospitalist Progress Note     Carlos Howell Patient Status:  Inpatient    3/26/1940 MRN [id number]   Location [facility] 5NW-A Attending James Nunez,    Hosp Day # 1 PCP Simba Nunez MD     Chief Complaint: Generalized weakness, fever    Subjective:     Patient worked with PT today. RN reports dark stool. No fever. Feels better. No SOB, has occasional cough.     Objective:    Review of Systems:   A comprehensive review of systems was completed; pertinent positive and negatives stated in subjective.    Vital signs:  Temp:  [97.5 °F (36.4 °C)-99 °F (37.2 °C)] 97.5 °F (36.4 °C)  Pulse:  [73-95] 78  Resp:  [16-21] 18  BP: ()/(56-65) 121/61  SpO2:  [92 %-94 %] 94 %    Physical Exam:    General: No acute distress, awake and alert  Respiratory: No wheezes, no rhonchi  Cardiovascular: S1, S2, regular rate and rhythm  Abdomen: Soft, Non-tender, non-distended, positive bowel sounds  Neuro: SPAULDING x 4  Extremities: No edema    Diagnostic Data:    Labs:  Recent Labs   Lab 24  1428 24  0628   WBC 10.2 11.8*   HGB 13.4 12.4*   MCV 83.6 82.5   .0* 113.0*     Recent Labs   Lab 24  1428 24  0628   * 98   BUN 17 16   CREATSERUM 1.07 0.94   CA 8.7 8.5   ALB 2.7*  --     142   K 3.6 3.8    113*   CO2 25.0 24.0   ALKPHO 92  --    AST 35  --    ALT 29  --    BILT 0.7  --    TP 7.6  --      Estimated Creatinine Clearance: 49.9 mL/min (based on SCr of 0.94 mg/dL).    Recent Labs   Lab 24  1428   TROPHS 18     No results for input(s): \"PTP\", \"INR\" in the last 168 hours.     Microbiology  No results found for this visit on 24.    Imaging: Reviewed in Epic.    Medications:    azithromycin  500 mg Intravenous Q24H    ampicillin-sulbactam  3 g Intravenous q6h    enoxaparin  40 mg Subcutaneous Daily    aspirin  81 mg Oral Daily    rosuvastatin  5 mg Oral Nightly    metoprolol tartrate  25 mg Oral Daily     cholecalciferol  2,000 Units Oral Daily    hydroxychloroquine  200 mg Oral Daily       Assessment & Plan:      #Weakness and fever  #Pneumonia  #Lactic acidosis, resolved  -CXR reviewed, continue unasyn. Reported flushing with azithromycin. Switch to doxycycline  -PCT elevated  -Sputum cultures  -Follow blood cultures    #Concern for adrenal insufficiency given chronic steroid use  -Patient had stopped taking his prednisone 1 month ago per family. Last fill was noted to be dispensed in October and at 2.5mg. Was given 100mg solu-cortef in ER, will not continue at this time. Cortisol is back and elevated    #Dark stool  -Repeat hgb  -GI eval  -IV PPI BID for now  -1 g drop in hgb could be dilutional. Stop IVF.  Not on iron  -Hold ASA and lovenox for now     #CAD s/p CABG - asa held, metoprolol, statin  #Essential HTN - metoprolol  #HLD - statin  #Rheumatoid Arthritis - Plaquenil, has f/u appointment with rheum in May      James Nunez DO    Supplementary Documentation:     Quality:  DVT Mechanical Prophylaxis:     Early ambuation  DVT Pharmacologic Prophylaxis   Medication    enoxaparin (Lovenox) 40 MG/0.4ML SUBQ injection 40 mg         DVT Pharmacologic prophylaxis: Aspirin 81 mg  Code Status: Full  Melo: No urinary catheter in place  ROSA: TBD    Discharge is dependent on: Clinical state, consultant recs  At this point Mr. Howell is expected to be discharge to: Home    The 21st Century Cures Act makes medical notes like these available to patients in the interest of transparency. Please be advised this is a medical document. Medical documents are intended to carry relevant information, facts as evident, and the clinical opinion of the practitioner. The medical note is intended as peer to peer communication and may appear blunt or direct. It is written in medical language and may contain abbreviations or verbiage that are unfamiliar.           **Certification    PHYSICIAN Certification of Need for Inpatient  Hospitalization - Initial Certification    Patient will require inpatient services that will reasonably be expected to span two midnight's based on the clinical documentation in H+P.   Based on patients current state of illness, I anticipate that, after discharge, patient will require TBD.

## 2024-03-26 NOTE — PLAN OF CARE
Pt A&O X 3. Can be forgetful. Room air. Need sputum. IV Abx. NSR on Tele. Lovenox. Primofit and briefed. IVF. PRN Norco. Regular diet. 1:1 feed. Takes pills whole one at a time. PT/OT to see. Baseline normally up with walker. No further needs at this time.     Problem: Patient/Family Goals  Goal: Patient/Family Long Term Goal  Description: Patient's Long Term Goal: discharge home    Interventions:  - Consult to Hosp  - PT/OT  - IV Abx, IVF  - See additional Care Plan goals for specific interventions  Outcome: Progressing  Goal: Patient/Family Short Term Goal  Description: Patient's Short Term Goal: pain management     Interventions:   - Consult to Hosp  - IV Abx, IVF  - PT/OT  - See additional Care Plan goals for specific interventions  Outcome: Progressing     Problem: PAIN - ADULT  Goal: Verbalizes/displays adequate comfort level or patient's stated pain goal  Description: INTERVENTIONS:  - Encourage pt to monitor pain and request assistance  - Assess pain using appropriate pain scale  - Administer analgesics based on type and severity of pain and evaluate response  - Implement non-pharmacological measures as appropriate and evaluate response  - Consider cultural and social influences on pain and pain management  - Manage/alleviate anxiety  - Utilize distraction and/or relaxation techniques  - Monitor for opioid side effects  - Notify MD/LIP if interventions unsuccessful or patient reports new pain  - Anticipate increased pain with activity and pre-medicate as appropriate  Outcome: Progressing     Problem: RISK FOR INFECTION - ADULT  Goal: Absence of fever/infection during anticipated neutropenic period  Description: INTERVENTIONS  - Monitor WBC  - Administer growth factors as ordered  - Implement neutropenic guidelines  Outcome: Progressing     Problem: SAFETY ADULT - FALL  Goal: Free from fall injury  Description: INTERVENTIONS:  - Assess pt frequently for physical needs  - Identify cognitive and physical  deficits and behaviors that affect risk of falls.  - [place] fall precautions as indicated by assessment.  - Educate pt/family on patient safety including physical limitations  - Instruct pt to call for assistance with activity based on assessment  - Modify environment to reduce risk of injury  - Provide assistive devices as appropriate  - Consider OT/PT consult to assist with strengthening/mobility  - Encourage toileting schedule  Outcome: Progressing     Problem: DISCHARGE PLANNING  Goal: Discharge to home or other facility with appropriate resources  Description: INTERVENTIONS:  - Identify barriers to discharge w/pt and caregiver  - Include patient/family/discharge partner in discharge planning  - Arrange for needed discharge resources and transportation as appropriate  - Identify discharge learning needs (meds, wound care, etc)  - Arrange for interpreters to assist at discharge as needed  - Consider post-discharge preferences of patient/family/discharge partner  - Complete POLST form as appropriate  - Assess patient's ability to be responsible for managing their own health  - Refer to Case Management Department for coordinating discharge planning if the patient needs post-hospital services based on physician/LIP order or complex needs related to functional status, cognitive ability or social support system  Outcome: Progressing

## 2024-03-26 NOTE — PROGRESS NOTES
Pt noted with soft dark tarry stools . Dr. Nunez notified of stools and episode of being flushed last night during dose of Azithromycin as reported by night shift. MD with orders for stat Hgb now, start Protonix every 12 hrs, Consult GI with Dr. Aguilera, labs in am, stop Zithromax and start Vibramcin.

## 2024-03-27 ENCOUNTER — ANESTHESIA (OUTPATIENT)
Dept: ENDOSCOPY | Facility: HOSPITAL | Age: 84
End: 2024-03-27
Payer: MEDICARE

## 2024-03-27 PROBLEM — J18.9 PNEUMONIA OF BOTH LUNGS DUE TO INFECTIOUS ORGANISM: Status: ACTIVE | Noted: 2022-05-21

## 2024-03-27 LAB
AFP-TM SERPL-MCNC: 3.9 NG/ML (ref ?–8)
ANION GAP SERPL CALC-SCNC: 8 MMOL/L (ref 0–18)
BASOPHILS # BLD AUTO: 0.04 X10(3) UL (ref 0–0.2)
BASOPHILS NFR BLD AUTO: 0.5 %
BUN BLD-MCNC: 13 MG/DL (ref 9–23)
CALCIUM BLD-MCNC: 8.6 MG/DL (ref 8.5–10.1)
CHLORIDE SERPL-SCNC: 108 MMOL/L (ref 98–112)
CO2 SERPL-SCNC: 24 MMOL/L (ref 21–32)
CREAT BLD-MCNC: 0.74 MG/DL
EGFRCR SERPLBLD CKD-EPI 2021: 90 ML/MIN/1.73M2 (ref 60–?)
EOSINOPHIL # BLD AUTO: 0.12 X10(3) UL (ref 0–0.7)
EOSINOPHIL NFR BLD AUTO: 1.6 %
ERYTHROCYTE [DISTWIDTH] IN BLOOD BY AUTOMATED COUNT: 16 %
GLUCOSE BLD-MCNC: 75 MG/DL (ref 70–99)
HAV AB SER QL IA: REACTIVE
HAV IGM SER QL: NONREACTIVE
HBV CORE AB SERPL QL IA: NONREACTIVE
HBV SURFACE AB SER QL: NONREACTIVE
HBV SURFACE AB SERPL IA-ACNC: <3.1 MIU/ML
HBV SURFACE AG SER-ACNC: <0.1 [IU]/L
HBV SURFACE AG SERPL QL IA: NONREACTIVE
HCT VFR BLD AUTO: 41.7 %
HCV AB SERPL QL IA: NONREACTIVE
HGB BLD-MCNC: 12.8 G/DL
IMM GRANULOCYTES # BLD AUTO: 0.03 X10(3) UL (ref 0–1)
IMM GRANULOCYTES NFR BLD: 0.4 %
IMMUNOGLOBULIN PNL SER-MCNC: 1770 MG/DL (ref 791–1643)
LYMPHOCYTES # BLD AUTO: 1.28 X10(3) UL (ref 1–4)
LYMPHOCYTES NFR BLD AUTO: 17.2 %
MAGNESIUM SERPL-MCNC: 1.7 MG/DL (ref 1.6–2.6)
MCH RBC QN AUTO: 26.1 PG (ref 26–34)
MCHC RBC AUTO-ENTMCNC: 30.7 G/DL (ref 31–37)
MCV RBC AUTO: 85.1 FL
MONOCYTES # BLD AUTO: 0.5 X10(3) UL (ref 0.1–1)
MONOCYTES NFR BLD AUTO: 6.7 %
NEUTROPHILS # BLD AUTO: 5.49 X10 (3) UL (ref 1.5–7.7)
NEUTROPHILS # BLD AUTO: 5.49 X10(3) UL (ref 1.5–7.7)
NEUTROPHILS NFR BLD AUTO: 73.6 %
OSMOLALITY SERPL CALC.SUM OF ELEC: 289 MOSM/KG (ref 275–295)
PLATELET # BLD AUTO: 120 10(3)UL (ref 150–450)
POTASSIUM SERPL-SCNC: 3.7 MMOL/L (ref 3.5–5.1)
RBC # BLD AUTO: 4.9 X10(6)UL
SODIUM SERPL-SCNC: 140 MMOL/L (ref 136–145)
WBC # BLD AUTO: 7.5 X10(3) UL (ref 4–11)

## 2024-03-27 PROCEDURE — 99232 SBSQ HOSP IP/OBS MODERATE 35: CPT | Performed by: HOSPITALIST

## 2024-03-27 PROCEDURE — 0DB68ZX EXCISION OF STOMACH, VIA NATURAL OR ARTIFICIAL OPENING ENDOSCOPIC, DIAGNOSTIC: ICD-10-PCS | Performed by: STUDENT IN AN ORGANIZED HEALTH CARE EDUCATION/TRAINING PROGRAM

## 2024-03-27 PROCEDURE — 06L38CZ OCCLUSION OF ESOPHAGEAL VEIN WITH EXTRALUMINAL DEVICE, VIA NATURAL OR ARTIFICIAL OPENING ENDOSCOPIC: ICD-10-PCS | Performed by: STUDENT IN AN ORGANIZED HEALTH CARE EDUCATION/TRAINING PROGRAM

## 2024-03-27 RX ORDER — HYDROMORPHONE HYDROCHLORIDE 1 MG/ML
0.4 INJECTION, SOLUTION INTRAMUSCULAR; INTRAVENOUS; SUBCUTANEOUS EVERY 5 MIN PRN
OUTPATIENT
Start: 2024-03-27 | End: 2024-03-27

## 2024-03-27 RX ORDER — LIDOCAINE HYDROCHLORIDE 10 MG/ML
INJECTION, SOLUTION EPIDURAL; INFILTRATION; INTRACAUDAL; PERINEURAL AS NEEDED
Status: DISCONTINUED | OUTPATIENT
Start: 2024-03-27 | End: 2024-03-27 | Stop reason: SURG

## 2024-03-27 RX ORDER — HYDROMORPHONE HYDROCHLORIDE 1 MG/ML
0.6 INJECTION, SOLUTION INTRAMUSCULAR; INTRAVENOUS; SUBCUTANEOUS EVERY 5 MIN PRN
OUTPATIENT
Start: 2024-03-27 | End: 2024-03-27

## 2024-03-27 RX ORDER — HYDROMORPHONE HYDROCHLORIDE 1 MG/ML
0.2 INJECTION, SOLUTION INTRAMUSCULAR; INTRAVENOUS; SUBCUTANEOUS EVERY 5 MIN PRN
OUTPATIENT
Start: 2024-03-27 | End: 2024-03-27

## 2024-03-27 RX ORDER — PHENYLEPHRINE HCL 10 MG/ML
VIAL (ML) INJECTION AS NEEDED
Status: DISCONTINUED | OUTPATIENT
Start: 2024-03-27 | End: 2024-03-27 | Stop reason: SURG

## 2024-03-27 RX ORDER — SODIUM CHLORIDE, SODIUM LACTATE, POTASSIUM CHLORIDE, CALCIUM CHLORIDE 600; 310; 30; 20 MG/100ML; MG/100ML; MG/100ML; MG/100ML
INJECTION, SOLUTION INTRAVENOUS CONTINUOUS PRN
Status: DISCONTINUED | OUTPATIENT
Start: 2024-03-27 | End: 2024-03-27 | Stop reason: SURG

## 2024-03-27 RX ORDER — ONDANSETRON 2 MG/ML
4 INJECTION INTRAMUSCULAR; INTRAVENOUS EVERY 6 HOURS PRN
OUTPATIENT
Start: 2024-03-27

## 2024-03-27 RX ORDER — METOCLOPRAMIDE HYDROCHLORIDE 5 MG/ML
10 INJECTION INTRAMUSCULAR; INTRAVENOUS EVERY 8 HOURS PRN
OUTPATIENT
Start: 2024-03-27

## 2024-03-27 RX ORDER — NALOXONE HYDROCHLORIDE 0.4 MG/ML
0.08 INJECTION, SOLUTION INTRAMUSCULAR; INTRAVENOUS; SUBCUTANEOUS AS NEEDED
OUTPATIENT
Start: 2024-03-27 | End: 2024-03-27

## 2024-03-27 RX ORDER — SODIUM CHLORIDE, SODIUM LACTATE, POTASSIUM CHLORIDE, CALCIUM CHLORIDE 600; 310; 30; 20 MG/100ML; MG/100ML; MG/100ML; MG/100ML
INJECTION, SOLUTION INTRAVENOUS CONTINUOUS
OUTPATIENT
Start: 2024-03-27

## 2024-03-27 RX ADMIN — LIDOCAINE HYDROCHLORIDE 25 MG: 10 INJECTION, SOLUTION EPIDURAL; INFILTRATION; INTRACAUDAL; PERINEURAL at 14:00:00

## 2024-03-27 RX ADMIN — PHENYLEPHRINE HCL 100 MCG: 10 MG/ML VIAL (ML) INJECTION at 14:11:00

## 2024-03-27 RX ADMIN — SODIUM CHLORIDE, SODIUM LACTATE, POTASSIUM CHLORIDE, CALCIUM CHLORIDE: 600; 310; 30; 20 INJECTION, SOLUTION INTRAVENOUS at 13:58:00

## 2024-03-27 RX ADMIN — PHENYLEPHRINE HCL 100 MCG: 10 MG/ML VIAL (ML) INJECTION at 14:09:00

## 2024-03-27 NOTE — PLAN OF CARE
Pt alert and oriented x3, can be forgetful. RA. IV/PO abx. Tele, SR. Octeoride gtt. Clear liquid diet, NPO at midnight for complete US tomorrow. Pt and family updated on poc. Call light in reach. Safety precautions in place. All needs are met at this time.    Problem: Patient/Family Goals  Goal: Patient/Family Long Term Goal  Description: Patient's Long Term Goal: discharge home    Interventions:  - Consult to Hosp  - PT/OT  - IV Abx, IVF  - See additional Care Plan goals for specific interventions  Outcome: Progressing  Goal: Patient/Family Short Term Goal  Description: Patient's Short Term Goal: pain management   3/27 noc: EGD [place]  3/27: NPO at midnight, US abd tomorrow  Interventions:   - Consult to Hosp  - IV Abx, IVF  - PT/OT  - See additional Care Plan goals for specific interventions  Outcome: Progressing     Problem: PAIN - ADULT  Goal: Verbalizes/displays adequate comfort level or patient's stated pain goal  Description: INTERVENTIONS:  - Encourage pt to monitor pain and request assistance  - Assess pain using appropriate pain scale  - Administer analgesics based on type and severity of pain and evaluate response  - Implement non-pharmacological measures as appropriate and evaluate response  - Consider cultural and social influences on pain and pain management  - Manage/alleviate anxiety  - Utilize distraction and/or relaxation techniques  - Monitor for opioid side effects  - Notify MD/LIP if interventions unsuccessful or patient reports new pain  - Anticipate increased pain with activity and pre-medicate as appropriate  Outcome: Progressing     Problem: RISK FOR INFECTION - ADULT  Goal: Absence of fever/infection during anticipated neutropenic period  Description: INTERVENTIONS  - Monitor WBC  - Administer growth factors as ordered  - Implement neutropenic guidelines  Outcome: Progressing     Problem: SAFETY ADULT - FALL  Goal: Free from fall injury  Description: INTERVENTIONS:  - Assess pt frequently for  physical needs  - Identify cognitive and physical deficits and behaviors that affect risk of falls.  - [place] fall precautions as indicated by assessment.  - Educate pt/family on patient safety including physical limitations  - Instruct pt to call for assistance with activity based on assessment  - Modify environment to reduce risk of injury  - Provide assistive devices as appropriate  - Consider OT/PT consult to assist with strengthening/mobility  - Encourage toileting schedule  Outcome: Progressing     Problem: DISCHARGE PLANNING  Goal: Discharge to home or other facility with appropriate resources  Description: INTERVENTIONS:  - Identify barriers to discharge w/pt and caregiver  - Include patient/family/discharge partner in discharge planning  - Arrange for needed discharge resources and transportation as appropriate  - Identify discharge learning needs (meds, wound care, etc)  - Arrange for interpreters to assist at discharge as needed  - Consider post-discharge preferences of patient/family/discharge partner  - Complete POLST form as appropriate  - Assess patient's ability to be responsible for managing their own health  - Refer to Case Management Department for coordinating discharge planning if the patient needs post-hospital services based on physician/LIP order or complex needs related to functional status, cognitive ability or social support system  Outcome: Progressing     Problem: GASTROINTESTINAL - ADULT  Goal: Minimal or absence of nausea and vomiting  Description: INTERVENTIONS:  - Maintain adequate hydration with IV or PO as ordered and tolerated  - Nasogastric tube to low intermittent suction as ordered  - Evaluate effectiveness of ordered antiemetic medications  - Provide nonpharmacologic comfort measures as appropriate  - Advance diet as tolerated, if ordered  - Obtain nutritional consult as needed  - Evaluate fluid balance  Outcome: Progressing  Goal: Maintains or returns to baseline bowel  function  Description: INTERVENTIONS:  - Assess bowel function  - Maintain adequate hydration with IV or PO as ordered and tolerated  - Evaluate effectiveness of GI medications  - Encourage mobilization and activity  - Obtain nutritional consult as needed  - Establish a toileting routine/schedule  - Consider collaborating with pharmacy to review patient's medication profile  Outcome: Progressing  Goal: Maintains adequate nutritional intake (undernourished)  Description: INTERVENTIONS:  - Monitor percentage of each meal consumed  - Identify factors contributing to decreased intake, treat as appropriate  - Assist with meals as needed  - Monitor I&O, WT and lab values  - Obtain nutritional consult as needed  - Optimize oral hygiene and moisture  - Encourage food from home; allow for food preferences  - Enhance eating environment  Outcome: Progressing

## 2024-03-27 NOTE — CM/SW NOTE
Met with patient to discuss discharge planning. Discussed HH, he was agreeable but deferred SW to contact his daughter (Henna) to further discuss.    Spoke with Henna regarding HH. She was agreeable. Will email her list of options once available.    Email: [email or web address]    BRIAN/VALDEMAR to remain available for support and/or discharge planning.    JALIL Mayfield  Discharge Planner  [phone]

## 2024-03-27 NOTE — OPERATIVE REPORT
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  Informed Consent: The planned procedure(s), the explanation of the procedure, its expected benefits, the potential complications and risks and possible alternatives and their benefits and risks were discussed with the patient or the patient's surrogate. The discussion of risks, not limited to but including bleeding, infection, perforation, adverse effects from anesthesia, need for emergency surgery/prolonged hospitalization,  cardiac arrhythmias,  and aspiration were discussed with patient.  Pt and/or surrogate understood the proposed procedure(s), its risks, benefits and alternatives and wish to proceed with procedure(s). All questions answered in full.  After all questions were answered to their satisfaction, a signed, informed, and witnessed consent was obtained.  Physical Exam: Heart: regular rate and rhythm. No rubs, murmurs, or gallops. Lungs: Clear to auscultation bilaterally. Abdomen: Soft, non-tender, non distended. No rebound tenderness, no guarding.   A TIME OUT WAS COMPLETED prior to the procedure to confirm the patient, procedure(s) and complete endoscopy safety procedure.  Sedation: Monitored Anesthesia Care; ASA class per anesthesiology team   Monitoring: Pulsoximetry, pulse, respirations, and blood pressure , vitals were monitored throughout the entire procedure under monitored anesthesia care.   Procedure: The patient was then brought to the endoscopy suite where his/her pulse, pulse oximetry and blood pressure were monitored. The patient  was placed in the left lateral decubitus position and deep sedation was administered. Once adequate sedation was achieved, a bite block was placed and a lubricated tip of an Olympus video upper endoscope was inserted through the oropharynx and gently manipulated through the esophagus into the stomach and the second portion of the duodenum. Upon withdrawal of the endoscope, careful visualization of the mucosa was performed. The endoscope was then withdrawn into the gastric antrum and placed in a retroflexed position.  The endoscope was then righted, and air was suctioned from the stomach.  The endoscope was then withdrawn from the patient, with careful visual inspection of the mucosa. The patient left the procedure room in stable condition for recovery. Findings and endotherapy as listed below  Toleration: Patient tolerated procedure well   Complications: No immediate complications   Technical Difficulty:  The procedure was not technically difficult   Estimated Blood Loss: Minimal, less than 5mL of estimated blood loss.     Findings and Therapeutics:  Esophagus:   2 columns of Grade II and III varices - largest varix, with high risk stigmata (red elio sign).   Therapeutics: 4 esophageal bands (Cook 4-6-10 Shooter) deployed with success for esophageal ligatation. Incomplete eradication. Hemostasis at the end of the procedure.   GE junction appreciated at 34 cm from the incisors.  Diaphragmatic impression noted at 40cm from the incisors.   Stomach:    Portal hypertensive gastropathy, mild.   The gastric body, antrum, fundus, cardia, and angularis were normal. No ulcers, erosions or masses visualized. Endoscope was placed in a retroflexion view in the stomach. There was  evidence of a hiatal hernia, no gastric varices seen. Biopsies with cold forceps were obtained.  Duodenum:   Duodenal diverticula in the second portion, large. The entire examined duodenum was normal.  Biopsies with cold forceps were obtained.    Recommendations:  Post EGD precautions, watch for bleeding, infection, perforation, adverse drug reactions   Follow-up biopsies  PPI IV q12 hours; daily antibiotics x 5 days total, given UGIB and cirrhosis; octreotide gtt  RUQ US  Chronic liver disease workup for presumed cirrhosis; AFP  Repeat EGD in 3 weeks, banding protocol.  Liquid diet x24 hours, then soft diet    GI will follow. Post procedure, I spoke to patient's family member, Mona.     Lion Aguilera MD  3/27/2024  2:14 PM

## 2024-03-27 NOTE — PLAN OF CARE
Pt A&O X 3. Can be forgetful. Room air. IV and PO Abx. NSR on Tele. Tele called, patient had 5 runs of bigeminy. MD notified. Potassium and mag ordered. Lovenox on hold. Primofit and briefed. Protonixs IV. Denies pain. NPO at midnight for EGD in AM. Up X 1 assist with walker. No further needs at this time.     Problem: Patient/Family Goals  Goal: Patient/Family Long Term Goal  Description: Patient's Long Term Goal: discharge home    Interventions:  - Consult to Hosp  - PT/OT  - IV Abx, IVF  - See additional Care Plan goals for specific interventions  Outcome: Progressing  Goal: Patient/Family Short Term Goal  Description: Patient's Short Term Goal: pain management   3/27 noc: EGD [place]    Interventions:   - Consult to Hosp  - IV Abx, IVF  - PT/OT  - See additional Care Plan goals for specific interventions  Outcome: Progressing     Problem: PAIN - ADULT  Goal: Verbalizes/displays adequate comfort level or patient's stated pain goal  Description: INTERVENTIONS:  - Encourage pt to monitor pain and request assistance  - Assess pain using appropriate pain scale  - Administer analgesics based on type and severity of pain and evaluate response  - Implement non-pharmacological measures as appropriate and evaluate response  - Consider cultural and social influences on pain and pain management  - Manage/alleviate anxiety  - Utilize distraction and/or relaxation techniques  - Monitor for opioid side effects  - Notify MD/LIP if interventions unsuccessful or patient reports new pain  - Anticipate increased pain with activity and pre-medicate as appropriate  Outcome: Progressing     Problem: RISK FOR INFECTION - ADULT  Goal: Absence of fever/infection during anticipated neutropenic period  Description: INTERVENTIONS  - Monitor WBC  - Administer growth factors as ordered  - Implement neutropenic guidelines  Outcome: Progressing     Problem: SAFETY ADULT - FALL  Goal: Free from fall injury  Description: INTERVENTIONS:  - Assess pt  frequently for physical needs  - Identify cognitive and physical deficits and behaviors that affect risk of falls.  - [place] fall precautions as indicated by assessment.  - Educate pt/family on patient safety including physical limitations  - Instruct pt to call for assistance with activity based on assessment  - Modify environment to reduce risk of injury  - Provide assistive devices as appropriate  - Consider OT/PT consult to assist with strengthening/mobility  - Encourage toileting schedule  Outcome: Progressing     Problem: DISCHARGE PLANNING  Goal: Discharge to home or other facility with appropriate resources  Description: INTERVENTIONS:  - Identify barriers to discharge w/pt and caregiver  - Include patient/family/discharge partner in discharge planning  - Arrange for needed discharge resources and transportation as appropriate  - Identify discharge learning needs (meds, wound care, etc)  - Arrange for interpreters to assist at discharge as needed  - Consider post-discharge preferences of patient/family/discharge partner  - Complete POLST form as appropriate  - Assess patient's ability to be responsible for managing their own health  - Refer to Case Management Department for coordinating discharge planning if the patient needs post-hospital services based on physician/LIP order or complex needs related to functional status, cognitive ability or social support system  Outcome: Progressing     Problem: GASTROINTESTINAL - ADULT  Goal: Minimal or absence of nausea and vomiting  Description: INTERVENTIONS:  - Maintain adequate hydration with IV or PO as ordered and tolerated  - Nasogastric tube to low intermittent suction as ordered  - Evaluate effectiveness of ordered antiemetic medications  - Provide nonpharmacologic comfort measures as appropriate  - Advance diet as tolerated, if ordered  - Obtain nutritional consult as needed  - Evaluate fluid balance  Outcome: Progressing  Goal: Maintains or returns to  baseline bowel function  Description: INTERVENTIONS:  - Assess bowel function  - Maintain adequate hydration with IV or PO as ordered and tolerated  - Evaluate effectiveness of GI medications  - Encourage mobilization and activity  - Obtain nutritional consult as needed  - Establish a toileting routine/schedule  - Consider collaborating with pharmacy to review patient's medication profile  Outcome: Progressing  Goal: Maintains adequate nutritional intake (undernourished)  Description: INTERVENTIONS:  - Monitor percentage of each meal consumed  - Identify factors contributing to decreased intake, treat as appropriate  - Assist with meals as needed  - Monitor I&O, WT and lab values  - Obtain nutritional consult as needed  - Optimize oral hygiene and moisture  - Encourage food from home; allow for food preferences  - Enhance eating environment  Outcome: Progressing

## 2024-03-27 NOTE — ANESTHESIA POSTPROCEDURE EVALUATION
[facility]    Carlos Howell Patient Status:  Inpatient   Age/Gender 84 year old male MRN [id number]   Location [facility] Attending Abdullahi Severino MD   Hosp Day # 2 PCP Simba Nunez MD       Anesthesia Post-op Note    ESOPHAGOGASTRODUODENOSCOPY (EGD) W/ BIOPSIES & ESOPHAGEAL BANDING X3    Procedure Summary       Date: 03/27/24 Room / Location:  ENDOSCOPY 02 / [facility]    Anesthesia Start: 1359 Anesthesia Stop: 1423    Procedure: ESOPHAGOGASTRODUODENOSCOPY (EGD) W/ BIOPSIES & ESOPHAGEAL BANDING X3 Diagnosis: (HIATAL HERNIA, DUODENAL DIVERTICULUM, GASTRITIS, ESOPHAGEAL VARICES)    Surgeons: Lion Aguilera MD Anesthesiologist: Ted Nieto MD    Anesthesia Type: MAC ASA Status: 3            Anesthesia Type: MAC    Vitals Value Taken Time   /55 03/27/24 1424   Temp 98.3 °F (36.8 °C) 03/27/24 1424   Pulse 84 03/27/24 1424   Resp 16 03/27/24 1424   SpO2 96 % 03/27/24 1424       Patient Location: Endoscopy    Anesthesia Type: MAC    Airway Patency: patent    Postop Pain Control: adequate    Mental Status: mildly sedated but able to meaningfully participate in the post-anesthesia evaluation    Nausea/Vomiting: none    Cardiopulmonary/Hydration status: stable euvolemic    Complications: no apparent anesthesia related complications    Postop vital signs: stable    Dental Exam: Unchanged from Preop    Patient to be discharged from PACU when criteria met.

## 2024-03-27 NOTE — PROGRESS NOTES
[facility]   part of [facility]     Hospitalist Progress Note     Carlos Howell Patient Status:  Inpatient    3/26/1940 MRN [id number]   Location [facility] 5NW-A Attending James Nunez,    Hosp Day # 2 PCP Simba Nunez MD     Chief Complaint: Generalized weakness, fever    Subjective:   Feels ok     Objective:    Review of Systems:   A comprehensive review of systems was completed; pertinent positive and negatives stated in subjective.    Vital signs:  Temp:  [97.5 °F (36.4 °C)-98.3 °F (36.8 °C)] 97.9 °F (36.6 °C)  Pulse:  [75-86] 86  Resp:  [16-18] 16  BP: (121-152)/(61-83) 152/83  SpO2:  [92 %-96 %] 92 %  Physical Exam:    General: No acute distress, awake and alert  Respiratory: No wheezes, no rhonchi  Cardiovascular: S1, S2, regular rate and rhythm  Abdomen: Soft, Non-tender, non-distended, positive bowel sounds  Extremities: No edema    Diagnostic Data:    Labs:  Recent Labs   Lab 24  1428 24  0628 24  1220 24  0601   WBC 10.2 11.8*  --  7.5   HGB 13.4 12.4* 12.5* 12.8*   MCV 83.6 82.5  --  85.1   .0* 113.0*  --  120.0*     Recent Labs   Lab 24  1428 24  0628 24  2201 24  0601   * 98  --  75   BUN 17 16  --  13   CREATSERUM 1.07 0.94  --  0.74   CA 8.7 8.5  --  8.6   ALB 2.7*  --   --   --     142  --  140   K 3.6 3.8 3.7 3.7    113*  --  108   CO2 25.0 24.0  --  24.0   ALKPHO 92  --   --   --    AST 35  --   --   --    ALT 29  --   --   --    BILT 0.7  --   --   --    TP 7.6  --   --   --      Estimated Creatinine Clearance: 63.3 mL/min (based on SCr of 0.74 mg/dL).    Recent Labs   Lab 24  1428   TROPHS 18     No results for input(s): \"PTP\", \"INR\" in the last 168 hours.     Microbiology  Hospital Encounter on 24   1. Blood Culture     Status: None (Preliminary result)    Collection Time: 24  3:34 PM    Specimen: Blood,peripheral   Result Value Ref Range    Blood Culture Result No  Growth 1 Day N/A   Imaging: Reviewed in Epic.  Medications:    ampicillin-sulbactam  3 g Intravenous q6h    pantoprazole  40 mg Intravenous Q12H    doxycycline  100 mg Oral 2 times per day    [Held by provider] enoxaparin  40 mg Subcutaneous Daily    [Held by provider] aspirin  81 mg Oral Daily    rosuvastatin  5 mg Oral Nightly    metoprolol tartrate  25 mg Oral Daily    cholecalciferol  2,000 Units Oral Daily    hydroxychloroquine  200 mg Oral Daily     Assessment & Plan:      #Weakness and fever  #Pneumonia  #Lactic acidosis, resolved  -CXR reviewed, continue unasyn. Reported flushing with azithromycin. Switched to doxycycline    #Possible adrenal insufficiency on admission given chronic steroid use- suspect ruled out   -Patient had stopped taking his prednisone 1 month ago per family. Last fill was noted to be dispensed in October and at 2.5mg. Was given 100mg solu-cortef in ER, will not continue at this time. Cortisol is back and elevated    #Dark stool  -Repeat hgb  -GI recs appreciated   -IV PPI BID for now  -EGD today- f/u GI recs   -Hold ASA and lovenox for now     #CAD s/p CABG - asa held, metoprolol, statin  #Essential HTN - metoprolol  #HLD - statin  #Rheumatoid Arthritis - Plaquenil, has f/u appointment with rheum in May      DC planning pending EGD results and GI recs     Abdullahi Severino MD     Supplementary Documentation:     Quality:  DVT Mechanical Prophylaxis:     Early ambuation  DVT Pharmacologic Prophylaxis   Medication    [Held by provider] enoxaparin (Lovenox) 40 MG/0.4ML SUBQ injection 40 mg         DVT Pharmacologic prophylaxis: Aspirin 81 mg  Code Status: Full  Melo: External urinary catheter in place  ROSA: TBD    Discharge is dependent on: Clinical state, consultant recs  At this point Mr. Howell is expected to be discharge to: Home    The 21st Century Cures Act makes medical notes like these available to patients in the interest of transparency. Please be advised this is a medical  document. Medical documents are intended to carry relevant information, facts as evident, and the clinical opinion of the practitioner. The medical note is intended as peer to peer communication and may appear blunt or direct. It is written in medical language and may contain abbreviations or verbiage that are unfamiliar.

## 2024-03-28 ENCOUNTER — APPOINTMENT (OUTPATIENT)
Dept: ULTRASOUND IMAGING | Facility: HOSPITAL | Age: 84
End: 2024-03-28
Attending: NURSE PRACTITIONER
Payer: MEDICARE

## 2024-03-28 PROBLEM — I85.00 ESOPHAGEAL VARICES WITHOUT BLEEDING (HCC): Status: ACTIVE | Noted: 2024-03-28

## 2024-03-28 LAB
ACTIN SMOOTH MUSCLE AB: 13 UNITS
ALBUMIN SERPL-MCNC: 2.4 G/DL (ref 3.4–5)
ALBUMIN/GLOB SERPL: 0.5 {RATIO} (ref 1–2)
ALP LIVER SERPL-CCNC: 89 U/L
ALT SERPL-CCNC: 27 U/L
ANION GAP SERPL CALC-SCNC: 10 MMOL/L (ref 0–18)
AST SERPL-CCNC: 34 U/L (ref 15–37)
BASOPHILS # BLD AUTO: 0.07 X10(3) UL (ref 0–0.2)
BASOPHILS NFR BLD AUTO: 1 %
BILIRUB SERPL-MCNC: 0.6 MG/DL (ref 0.1–2)
BUN BLD-MCNC: 16 MG/DL (ref 9–23)
CALCIUM BLD-MCNC: 8.7 MG/DL (ref 8.5–10.1)
CHLORIDE SERPL-SCNC: 108 MMOL/L (ref 98–112)
CO2 SERPL-SCNC: 22 MMOL/L (ref 21–32)
CREAT BLD-MCNC: 0.9 MG/DL
DSDNA IGG SERPL IA-ACNC: 1.1 IU/ML
EGFRCR SERPLBLD CKD-EPI 2021: 85 ML/MIN/1.73M2 (ref 60–?)
ENA AB SER QL IA: 0.2 UG/L
ENA AB SER QL IA: NEGATIVE
EOSINOPHIL # BLD AUTO: 0.07 X10(3) UL (ref 0–0.7)
EOSINOPHIL NFR BLD AUTO: 1 %
ERYTHROCYTE [DISTWIDTH] IN BLOOD BY AUTOMATED COUNT: 15.8 %
GLOBULIN PLAS-MCNC: 4.7 G/DL (ref 2.8–4.4)
GLUCOSE BLD-MCNC: 106 MG/DL (ref 70–99)
HCT VFR BLD AUTO: 41.4 %
HGB BLD-MCNC: 13.4 G/DL
IMM GRANULOCYTES # BLD AUTO: 0.05 X10(3) UL (ref 0–1)
IMM GRANULOCYTES NFR BLD: 0.7 %
INR BLD: 1.18 (ref 0.8–1.2)
LYMPHOCYTES # BLD AUTO: 0.76 X10(3) UL (ref 1–4)
LYMPHOCYTES NFR BLD AUTO: 11.2 %
M2 MITOCHONDRIAL AB: <20 UNITS
MCH RBC QN AUTO: 26.7 PG (ref 26–34)
MCHC RBC AUTO-ENTMCNC: 32.4 G/DL (ref 31–37)
MCV RBC AUTO: 82.5 FL
MONOCYTES # BLD AUTO: 0.43 X10(3) UL (ref 0.1–1)
MONOCYTES NFR BLD AUTO: 6.3 %
NEUTROPHILS # BLD AUTO: 5.4 X10 (3) UL (ref 1.5–7.7)
NEUTROPHILS # BLD AUTO: 5.4 X10(3) UL (ref 1.5–7.7)
NEUTROPHILS NFR BLD AUTO: 79.8 %
OSMOLALITY SERPL CALC.SUM OF ELEC: 292 MOSM/KG (ref 275–295)
PLATELET # BLD AUTO: 142 10(3)UL (ref 150–450)
POTASSIUM SERPL-SCNC: 3.8 MMOL/L (ref 3.5–5.1)
PROT SERPL-MCNC: 7.1 G/DL (ref 6.4–8.2)
PROTHROMBIN TIME: 15 SECONDS (ref 11.6–14.8)
RBC # BLD AUTO: 5.02 X10(6)UL
SODIUM SERPL-SCNC: 140 MMOL/L (ref 136–145)
WBC # BLD AUTO: 6.8 X10(3) UL (ref 4–11)

## 2024-03-28 PROCEDURE — 76700 US EXAM ABDOM COMPLETE: CPT | Performed by: NURSE PRACTITIONER

## 2024-03-28 PROCEDURE — 99232 SBSQ HOSP IP/OBS MODERATE 35: CPT | Performed by: HOSPITALIST

## 2024-03-28 NOTE — PLAN OF CARE
Pt A&O X 3. Can be forgetful. Room air. IV Abx and PO Abx. NSR on Tele. Lovenox on hold. EGD done today, see results. Primofit and briefed. Octreotide gtt infusing. PRN Norco for pain. Clear liquid diet. NPO at midnight for US of Abdomen. Up X 1 with walker. No further needs at this time.     Problem: Patient/Family Goals  Goal: Patient/Family Long Term Goal  Description: Patient's Long Term Goal: discharge home    Interventions:  - Consult to Hosp  - PT/OT  - IV Abx, IVF  - See additional Care Plan goals for specific interventions  Outcome: Progressing  Goal: Patient/Family Short Term Goal  Description: Patient's Short Term Goal: pain management   3/27 noc: EGD [place]  3/27: NPO at midnight, US abd tomorrow  Interventions:   - Consult to Hosp  - IV Abx, IVF  - PT/OT  - See additional Care Plan goals for specific interventions  Outcome: Progressing     Problem: PAIN - ADULT  Goal: Verbalizes/displays adequate comfort level or patient's stated pain goal  Description: INTERVENTIONS:  - Encourage pt to monitor pain and request assistance  - Assess pain using appropriate pain scale  - Administer analgesics based on type and severity of pain and evaluate response  - Implement non-pharmacological measures as appropriate and evaluate response  - Consider cultural and social influences on pain and pain management  - Manage/alleviate anxiety  - Utilize distraction and/or relaxation techniques  - Monitor for opioid side effects  - Notify MD/LIP if interventions unsuccessful or patient reports new pain  - Anticipate increased pain with activity and pre-medicate as appropriate  Outcome: Progressing     Problem: RISK FOR INFECTION - ADULT  Goal: Absence of fever/infection during anticipated neutropenic period  Description: INTERVENTIONS  - Monitor WBC  - Administer growth factors as ordered  - Implement neutropenic guidelines  Outcome: Progressing     Problem: SAFETY ADULT - FALL  Goal: Free from fall injury  Description:  INTERVENTIONS:  - Assess pt frequently for physical needs  - Identify cognitive and physical deficits and behaviors that affect risk of falls.  - [place] fall precautions as indicated by assessment.  - Educate pt/family on patient safety including physical limitations  - Instruct pt to call for assistance with activity based on assessment  - Modify environment to reduce risk of injury  - Provide assistive devices as appropriate  - Consider OT/PT consult to assist with strengthening/mobility  - Encourage toileting schedule  Outcome: Progressing     Problem: DISCHARGE PLANNING  Goal: Discharge to home or other facility with appropriate resources  Description: INTERVENTIONS:  - Identify barriers to discharge w/pt and caregiver  - Include patient/family/discharge partner in discharge planning  - Arrange for needed discharge resources and transportation as appropriate  - Identify discharge learning needs (meds, wound care, etc)  - Arrange for interpreters to assist at discharge as needed  - Consider post-discharge preferences of patient/family/discharge partner  - Complete POLST form as appropriate  - Assess patient's ability to be responsible for managing their own health  - Refer to Case Management Department for coordinating discharge planning if the patient needs post-hospital services based on physician/LIP order or complex needs related to functional status, cognitive ability or social support system  Outcome: Progressing     Problem: GASTROINTESTINAL - ADULT  Goal: Minimal or absence of nausea and vomiting  Description: INTERVENTIONS:  - Maintain adequate hydration with IV or PO as ordered and tolerated  - Nasogastric tube to low intermittent suction as ordered  - Evaluate effectiveness of ordered antiemetic medications  - Provide nonpharmacologic comfort measures as appropriate  - Advance diet as tolerated, if ordered  - Obtain nutritional consult as needed  - Evaluate fluid balance  Outcome: Progressing  Goal:  Maintains or returns to baseline bowel function  Description: INTERVENTIONS:  - Assess bowel function  - Maintain adequate hydration with IV or PO as ordered and tolerated  - Evaluate effectiveness of GI medications  - Encourage mobilization and activity  - Obtain nutritional consult as needed  - Establish a toileting routine/schedule  - Consider collaborating with pharmacy to review patient's medication profile  Outcome: Progressing  Goal: Maintains adequate nutritional intake (undernourished)  Description: INTERVENTIONS:  - Monitor percentage of each meal consumed  - Identify factors contributing to decreased intake, treat as appropriate  - Assist with meals as needed  - Monitor I&O, WT and lab values  - Obtain nutritional consult as needed  - Optimize oral hygiene and moisture  - Encourage food from home; allow for food preferences  - Enhance eating environment  Outcome: Progressing

## 2024-03-28 NOTE — PROGRESS NOTES
[facility]  GASTROENTEROLOGY PROGRESS NOTE   [facility]     Carlos Howell Patient Status:  Inpatient    3/26/1940 MRN [id number]   Location [facility] 5NW-A Attending Abdullahi Severino MD   Hosp Day # 3 PCP Simba Nunez MD       Reason for Consultation:   Melena  Esophageal varices s/p banding    Subjective:   Feels weak, no further overt GI bleeding this afternoon  Denies any abd pain  Tried to eat solid food, had nausea and regurgitated this     Patient Active Problem List   Diagnosis    Rheumatoid arthritis (HCC)    Gastroesophageal reflux disease with esophagitis without hemorrhage    Coronary artery disease involving native coronary artery of native heart without angina pectoris    Essential hypertension    Falling episodes    Pneumonia of both lungs due to infectious organism    Interstitial lung disease (HCC)    Weakness generalized    Esophageal varices without bleeding (HCC)       PMH, PSH, Fhx, Shx as per initial consult note    Review of Systems    12 point Review of Systems negative unless otherwise mentioned in Subjective.     Physical Exam  /79 (BP Location: Right arm)   Pulse 71   Temp 98.3 °F (36.8 °C) (Oral)   Resp 17   Ht 5' 4\" (1.626 m)   Wt 159 lb (72.1 kg)   SpO2 96%   BMI 27.29 kg/m²   Body mass index is 27.29 kg/m².      Gen: No acute distress  Resp: no respiratory distress  Abd: Soft, non-tender, non-distended. No rebound tenderness, no guarding.   Neuro: Aox3.     Diagnostic Data:      Labs:  Recent Labs   Lab 24  1428 24  0628 24  1220 24  0601 24  0623   WBC 10.2 11.8*  --  7.5 6.8   HGB 13.4 12.4* 12.5* 12.8* 13.4   MCV 83.6 82.5  --  85.1 82.5   .0* 113.0*  --  120.0* 142.0*   INR  --   --   --   --  1.18       Recent Labs   Lab 24  1428 24  0628 24  2201 24  0601 24  0623   * 98  --  75 106*   BUN 17 16  --  13 16   CREATSERUM 1.07 0.94  --  0.74 0.90   CA  8.7 8.5  --  8.6 8.7   ALB 2.7*  --   --   --  2.4*    142  --  140 140   K 3.6 3.8 3.7 3.7 3.8    113*  --  108 108   CO2 25.0 24.0  --  24.0 22.0   ALKPHO 92  --   --   --  89   AST 35  --   --   --  34   ALT 29  --   --   --  27   BILT 0.7  --   --   --  0.6   TP 7.6  --   --   --  7.1       Recent Labs   Lab 03/28/24  0623   PTP 15.0*   INR 1.18            No data recorded        Imaging: Imaging data reviewed in Epic.    Medications:    ampicillin-sulbactam  3 g Intravenous q6h    pantoprazole  40 mg Intravenous Q12H    doxycycline  100 mg Oral 2 times per day    [Held by provider] enoxaparin  40 mg Subcutaneous Daily    [Held by provider] aspirin  81 mg Oral Daily    rosuvastatin  5 mg Oral Nightly    metoprolol tartrate  25 mg Oral Daily    cholecalciferol  2,000 Units Oral Daily    hydroxychloroquine  200 mg Oral Daily       Allergies:  Allergies   Allergen Reactions    Azithromycin RASH     Face flushing       Imaging:  I have personally reviewed all pertinent available imaging.       ASSESSMENT / PLAN:     Carlos Howell is a 84 year old male with GI consult for melena, s/p EGD with esophageal varices and high risk stigmata s/p banding.     #Cirrhosis  -Portal HTN, US imaging nodular liver. Denies any sig ETOH use. 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   -IgG elevated, although ASMA/AMA, and given normal liver enzymes less likely autoimmune process.   -I think the risk of liver bx in this frail 84 year old pt outweighs the benefit. Fam agrees.   -EV s/p banding. Hg stable. Normal diet   -HCC screening: AFP wnl. No liver mass.       Recommendations:   PPI IV q12 hours, octreotide gtt, discontinue tomorrow; antibiotics 5 days total   Liquid diet today, can retrial solid food tomorrow ; low Na   IVF, analgesia, anti-emetics per primary team  EGD in 3-4 weeks, banding protocol      If patient can tolerate solid food tomorrow, and no overt GI bleeding, consider discharge home tomorrow. I discussed above  impression, management and natural course of cirrhosis and management with patient's wife, daughter, family members.     Lion Aguilera MD  [facility]astroenter[facility]

## 2024-03-28 NOTE — PROGRESS NOTES
[facility]   part of [facility]     Hospitalist Progress Note     Carlos Howell Patient Status:  Inpatient    3/26/1940 MRN [id number]   Location [facility] 5NW-A Attending James Nunez,    Hosp Day # 3 PCP Simba Nunez MD     Chief Complaint: Generalized weakness, fever    Subjective:   Feels ok s/p US     Objective:    Review of Systems:   A comprehensive review of systems was completed; pertinent positive and negatives stated in subjective.    Vital signs:  Temp:  [97.5 °F (36.4 °C)-98.5 °F (36.9 °C)] 98.2 °F (36.8 °C)  Pulse:  [63-96] 96  Resp:  [16-29] 17  BP: (104-164)/(55-89) 164/89  SpO2:  [92 %-97 %] 95 %  Physical Exam:    General: No acute distress, awake and alert  Respiratory: No wheezes, no rhonchi  Cardiovascular: S1, S2, regular rate and rhythm  Abdomen: Soft, Non-tender, non-distended, positive bowel sounds  Extremities: No edema    Diagnostic Data:    Labs:  Recent Labs   Lab 24  1428 24  0628 24  1220 24  0601 24  0623   WBC 10.2 11.8*  --  7.5 6.8   HGB 13.4 12.4* 12.5* 12.8* 13.4   MCV 83.6 82.5  --  85.1 82.5   .0* 113.0*  --  120.0* 142.0*   INR  --   --   --   --  1.18     Recent Labs   Lab 24  1428 24  0628 24  2201 24  0601 24  0623   * 98  --  75 106*   BUN 17 16  --  13 16   CREATSERUM 1.07 0.94  --  0.74 0.90   CA 8.7 8.5  --  8.6 8.7   ALB 2.7*  --   --   --  2.4*    142  --  140 140   K 3.6 3.8 3.7 3.7 3.8    113*  --  108 108   CO2 25.0 24.0  --  24.0 22.0   ALKPHO 92  --   --   --  89   AST 35  --   --   --  34   ALT 29  --   --   --  27   BILT 0.7  --   --   --  0.6   TP 7.6  --   --   --  7.1     Estimated Creatinine Clearance: 52.1 mL/min (based on SCr of 0.9 mg/dL).    Recent Labs   Lab 24  1428   TROPHS 18     Recent Labs   Lab 24  0623   PTP 15.0*   INR 1.18        Microbiology  Hospital Encounter on 24   1. Blood Culture     Status:  None (Preliminary result)    Collection Time: 03/25/24  3:34 PM    Specimen: Blood,peripheral   Result Value Ref Range    Blood Culture Result No Growth 2 Days N/A   Imaging: Reviewed in Epic.  Medications:    ampicillin-sulbactam  3 g Intravenous q6h    pantoprazole  40 mg Intravenous Q12H    doxycycline  100 mg Oral 2 times per day    [Held by provider] enoxaparin  40 mg Subcutaneous Daily    [Held by provider] aspirin  81 mg Oral Daily    rosuvastatin  5 mg Oral Nightly    metoprolol tartrate  25 mg Oral Daily    cholecalciferol  2,000 Units Oral Daily    hydroxychloroquine  200 mg Oral Daily     Assessment & Plan:      #Weakness and fever  #Pneumonia  #Lactic acidosis, resolved  -CXR reviewed, continue unasyn. Reported flushing with azithromycin. Switched to doxycycline    #Possible adrenal insufficiency on admission given chronic steroid use- suspect ruled out   -Patient had stopped taking his prednisone 1 month ago per family. Last fill was noted to be dispensed in October and at 2.5mg. Was given 100mg solu-cortef in ER, will not continue at this time. Cortisol is back and elevated    #Dark stool  -GI following  -s/p EGD with severe esophageal varices s/p banding  -PPI  -Hold ASA and lovenox for now  -cont abx for 5 days total   -Octreotide gtt   -RUQ US- cirrhotic morphology. LFTs unremarkable, INR 1.1      #CAD s/p CABG - asa held, metoprolol, statin  #Essential HTN - metoprolol  #HLD - statin  #Rheumatoid Arthritis - Plaquenil, has f/u appointment with rheum in May    DC planning once cleared by GI   Remains on octreotide gtt for now   Hgb stable     Abdullahi Severino MD     Supplementary Documentation:     Quality:  DVT Mechanical Prophylaxis:     Early ambuation  DVT Pharmacologic Prophylaxis   Medication    [Held by provider] enoxaparin (Lovenox) 40 MG/0.4ML SUBQ injection 40 mg         DVT Pharmacologic prophylaxis: Aspirin 81 mg  Code Status: Full  Melo: External urinary catheter in place  ROSA:  TBD    Discharge is dependent on: Clinical state, consultant recs  At this point Mr. Howell is expected to be discharge to: Home    The 21st Century Cures Act makes medical notes like these available to patients in the interest of transparency. Please be advised this is a medical document. Medical documents are intended to carry relevant information, facts as evident, and the clinical opinion of the practitioner. The medical note is intended as peer to peer communication and may appear blunt or direct. It is written in medical language and may contain abbreviations or verbiage that are unfamiliar.

## 2024-03-28 NOTE — PLAN OF CARE
Pt alert and oriented x3, can be forgetful. RA. IV/PO abx. Tele, SR. Octeoride gtt. full liquid diet. Pt and family updated on poc. Call light in reach. Safety precautions in place. All needs are met at this time.     Problem: Patient/Family Goals  Goal: Patient/Family Long Term Goal  Description: Patient's Long Term Goal: discharge home    Interventions:  - Consult to Hosp  - PT/OT  - IV Abx, IVF  - See additional Care Plan goals for specific interventions  Outcome: Progressing  Goal: Patient/Family Short Term Goal  Description: Patient's Short Term Goal: pain management   3/27 noc: EGD braxton  3/27: NPO at midnight, US abd tomorrow  3/28: tolerated diet  Interventions:   - Consult to Hosp  - IV Abx, IVF  - PT/OT  - See additional Care Plan goals for specific interventions  Outcome: Progressing     Problem: PAIN - ADULT  Goal: Verbalizes/displays adequate comfort level or patient's stated pain goal  Description: INTERVENTIONS:  - Encourage pt to monitor pain and request assistance  - Assess pain using appropriate pain scale  - Administer analgesics based on type and severity of pain and evaluate response  - Implement non-pharmacological measures as appropriate and evaluate response  - Consider cultural and social influences on pain and pain management  - Manage/alleviate anxiety  - Utilize distraction and/or relaxation techniques  - Monitor for opioid side effects  - Notify MD/LIP if interventions unsuccessful or patient reports new pain  - Anticipate increased pain with activity and pre-medicate as appropriate  Outcome: Progressing     Problem: RISK FOR INFECTION - ADULT  Goal: Absence of fever/infection during anticipated neutropenic period  Description: INTERVENTIONS  - Monitor WBC  - Administer growth factors as ordered  - Implement neutropenic guidelines  Outcome: Progressing     Problem: SAFETY ADULT - FALL  Goal: Free from fall injury  Description: INTERVENTIONS:  - Assess pt frequently for physical needs  -  Identify cognitive and physical deficits and behaviors that affect risk of falls.  - [place] fall precautions as indicated by assessment.  - Educate pt/family on patient safety including physical limitations  - Instruct pt to call for assistance with activity based on assessment  - Modify environment to reduce risk of injury  - Provide assistive devices as appropriate  - Consider OT/PT consult to assist with strengthening/mobility  - Encourage toileting schedule  Outcome: Progressing     Problem: DISCHARGE PLANNING  Goal: Discharge to home or other facility with appropriate resources  Description: INTERVENTIONS:  - Identify barriers to discharge w/pt and caregiver  - Include patient/family/discharge partner in discharge planning  - Arrange for needed discharge resources and transportation as appropriate  - Identify discharge learning needs (meds, wound care, etc)  - Arrange for interpreters to assist at discharge as needed  - Consider post-discharge preferences of patient/family/discharge partner  - Complete POLST form as appropriate  - Assess patient's ability to be responsible for managing their own health  - Refer to Case Management Department for coordinating discharge planning if the patient needs post-hospital services based on physician/LIP order or complex needs related to functional status, cognitive ability or social support system  Outcome: Progressing     Problem: GASTROINTESTINAL - ADULT  Goal: Minimal or absence of nausea and vomiting  Description: INTERVENTIONS:  - Maintain adequate hydration with IV or PO as ordered and tolerated  - Nasogastric tube to low intermittent suction as ordered  - Evaluate effectiveness of ordered antiemetic medications  - Provide nonpharmacologic comfort measures as appropriate  - Advance diet as tolerated, if ordered  - Obtain nutritional consult as needed  - Evaluate fluid balance  Outcome: Progressing  Goal: Maintains or returns to baseline bowel  function  Description: INTERVENTIONS:  - Assess bowel function  - Maintain adequate hydration with IV or PO as ordered and tolerated  - Evaluate effectiveness of GI medications  - Encourage mobilization and activity  - Obtain nutritional consult as needed  - Establish a toileting routine/schedule  - Consider collaborating with pharmacy to review patient's medication profile  Outcome: Progressing  Goal: Maintains adequate nutritional intake (undernourished)  Description: INTERVENTIONS:  - Monitor percentage of each meal consumed  - Identify factors contributing to decreased intake, treat as appropriate  - Assist with meals as needed  - Monitor I&O, WT and lab values  - Obtain nutritional consult as needed  - Optimize oral hygiene and moisture  - Encourage food from home; allow for food preferences  - Enhance eating environment  Outcome: Progressing

## 2024-03-28 NOTE — CM/SW NOTE
Emailed maria [email or web address] the accepting HH provider list yesterday. Left message with Henna to obtain HH choice.    SW/CM to remain available for support and/or discharge planning.    JALIL Mayfield  Discharge Planner  [phone]

## 2024-03-29 VITALS
WEIGHT: 159 LBS | SYSTOLIC BLOOD PRESSURE: 120 MMHG | RESPIRATION RATE: 17 BRPM | HEIGHT: 64 IN | OXYGEN SATURATION: 94 % | BODY MASS INDEX: 27.14 KG/M2 | HEART RATE: 68 BPM | DIASTOLIC BLOOD PRESSURE: 71 MMHG | TEMPERATURE: 98 F

## 2024-03-29 PROCEDURE — 99239 HOSP IP/OBS DSCHRG MGMT >30: CPT | Performed by: HOSPITALIST

## 2024-03-29 RX ORDER — DOXYCYCLINE HYCLATE 100 MG/1
100 CAPSULE ORAL EVERY 12 HOURS SCHEDULED
Qty: 3 CAPSULE | Refills: 0 | Status: SHIPPED | OUTPATIENT
Start: 2024-03-29 | End: 2024-04-03 | Stop reason: ALTCHOICE

## 2024-03-29 RX ORDER — PANTOPRAZOLE SODIUM 40 MG/1
40 TABLET, DELAYED RELEASE ORAL
Qty: 60 TABLET | Refills: 0 | Status: SHIPPED | OUTPATIENT
Start: 2024-03-29

## 2024-03-29 RX ORDER — AMOXICILLIN AND CLAVULANATE POTASSIUM 875; 125 MG/1; MG/1
1 TABLET, FILM COATED ORAL 2 TIMES DAILY
Qty: 3 TABLET | Refills: 0 | Status: SHIPPED | OUTPATIENT
Start: 2024-03-29 | End: 2024-04-03 | Stop reason: ALTCHOICE

## 2024-03-29 NOTE — PROGRESS NOTES
[facility]   part of [facility]     Hospitalist Progress Note     Carlos Howell Patient Status:  Inpatient    3/26/1940 MRN [id number]   Location [facility] 5NW-A Attending James Nunez,    Hosp Day # 4 PCP Simba Nunez MD     Chief Complaint: Generalized weakness, fever    Subjective:   Doing well     Objective:    Review of Systems:   A comprehensive review of systems was completed; pertinent positive and negatives stated in subjective.    Vital signs:  Temp:  [98.1 °F (36.7 °C)-99.1 °F (37.3 °C)] 98.3 °F (36.8 °C)  Pulse:  [71-86] 83  Resp:  [17-20] 17  BP: (126-165)/(77-91) 155/77  SpO2:  [91 %-97 %] 93 %  Physical Exam:    General: No acute distress, awake and alert  Respiratory: No wheezes, no rhonchi  Cardiovascular: S1, S2, regular rate and rhythm  Abdomen: Soft, Non-tender, non-distended, positive bowel sounds  Extremities: No edema    Diagnostic Data:    Labs:  Recent Labs   Lab 24  1428 24  0628 24  1220 24  0601 24  0623   WBC 10.2 11.8*  --  7.5 6.8   HGB 13.4 12.4* 12.5* 12.8* 13.4   MCV 83.6 82.5  --  85.1 82.5   .0* 113.0*  --  120.0* 142.0*   INR  --   --   --   --  1.18     Recent Labs   Lab 24  1428 24  0628 24  2201 24  0601 24  0623   * 98  --  75 106*   BUN 17 16  --  13 16   CREATSERUM 1.07 0.94  --  0.74 0.90   CA 8.7 8.5  --  8.6 8.7   ALB 2.7*  --   --   --  2.4*    142  --  140 140   K 3.6 3.8 3.7 3.7 3.8    113*  --  108 108   CO2 25.0 24.0  --  24.0 22.0   ALKPHO 92  --   --   --  89   AST 35  --   --   --  34   ALT 29  --   --   --  27   BILT 0.7  --   --   --  0.6   TP 7.6  --   --   --  7.1     Estimated Creatinine Clearance: 52.1 mL/min (based on SCr of 0.9 mg/dL).    Recent Labs   Lab 24  1428   TROPHS 18     Recent Labs   Lab 24  0623   PTP 15.0*   INR 1.18        Microbiology  Hospital Encounter on 24   1. Blood Culture     Status: None  (Preliminary result)    Collection Time: 03/25/24  3:34 PM    Specimen: Blood,peripheral   Result Value Ref Range    Blood Culture Result No Growth 3 Days N/A   Imaging: Reviewed in Epic.  Medications:    ampicillin-sulbactam  3 g Intravenous q6h    pantoprazole  40 mg Intravenous Q12H    doxycycline  100 mg Oral 2 times per day    [Held by provider] enoxaparin  40 mg Subcutaneous Daily    [Held by provider] aspirin  81 mg Oral Daily    rosuvastatin  5 mg Oral Nightly    metoprolol tartrate  25 mg Oral Daily    cholecalciferol  2,000 Units Oral Daily    hydroxychloroquine  200 mg Oral Daily     Assessment & Plan:      #Weakness and fever  #Pneumonia  #Lactic acidosis, resolved  -CXR reviewed, continue unasyn. Reported flushing with azithromycin. Switched to doxycycline    #Possible adrenal insufficiency on admission given chronic steroid use- suspect ruled out   -Patient had stopped taking his prednisone 1 month ago per family. Last fill was noted to be dispensed in October and at 2.5mg. Was given 100mg solu-cortef in ER, will not continue at this time. Cortisol is back and elevated    #Dark stool  -GI following  -s/p EGD with severe esophageal varices s/p banding  -PPI  -Hold ASA and lovenox for now  -cont abx for 5 days total   -Octreotide gtt stopped today   -RUQ US- cirrhotic morphology. LFTs unremarkable, INR 1.1      #CAD s/p CABG - asa held, metoprolol, statin  #Essential HTN - metoprolol  #HLD - statin  #Rheumatoid Arthritis - Plaquenil, has f/u appointment with rheum in May    DC planning today- d/w GI   F/u GI as outpt for repeat EGD        Abdullahi Severino MD     Supplementary Documentation:     Quality:  DVT Mechanical Prophylaxis:     Early ambuation  DVT Pharmacologic Prophylaxis   Medication    [Held by provider] enoxaparin (Lovenox) 40 MG/0.4ML SUBQ injection 40 mg         DVT Pharmacologic prophylaxis: Aspirin 81 mg  Code Status: Full  Melo: External urinary catheter in place  ROSA: TBD    Discharge  is dependent on: Clinical state, consultant recs  At this point Mr. Howell is expected to be discharge to: Home    The 21st Century Cures Act makes medical notes like these available to patients in the interest of transparency. Please be advised this is a medical document. Medical documents are intended to carry relevant information, facts as evident, and the clinical opinion of the practitioner. The medical note is intended as peer to peer communication and may appear blunt or direct. It is written in medical language and may contain abbreviations or verbiage that are unfamiliar.

## 2024-03-29 NOTE — PROGRESS NOTES
[facility]  GASTROENTEROLOGY PROGRESS NOTE   [facility] Gastroenterology     Carlos Howell Patient Status:  Inpatient    3/26/1940 MRN [id number]   Location [facility] 5NW-A Attending Abdullahi Severino MD   Hosp Day # 4 PCP Simba Nunez MD       Reason for Consultation:   Melena  Esophageal varices s/p banding    Subjective:   Patient feels better this morning, tired.   Denies any nausea, vomiting. No abd pain.   He wants to try eating solid food.     Patient Active Problem List   Diagnosis    Rheumatoid arthritis (HCC)    Gastroesophageal reflux disease with esophagitis without hemorrhage    Coronary artery disease involving native coronary artery of native heart without angina pectoris    Essential hypertension    Falling episodes    Pneumonia of both lungs due to infectious organism    Interstitial lung disease (HCC)    Weakness generalized    Esophageal varices without bleeding (HCC)       PMH, PSH, Fhx, Shx as per initial consult note    Review of Systems    12 point Review of Systems negative unless otherwise mentioned in Subjective.     Physical Exam  /77 (BP Location: Right arm)   Pulse 83   Temp 98.3 °F (36.8 °C) (Oral)   Resp 17   Ht 5' 4\" (1.626 m)   Wt 159 lb (72.1 kg)   SpO2 93%   BMI 27.29 kg/m²   Body mass index is 27.29 kg/m².      Gen: No acute distress  Resp: no respiratory distress  Abd: Soft, non-tender, non-distended. No rebound tenderness, no guarding.   Neuro: Aox3. No asterixis.     Diagnostic Data:      Labs:  Recent Labs   Lab 24  1428 24  0628 24  1220 24  0601 24  0623   WBC 10.2 11.8*  --  7.5 6.8   HGB 13.4 12.4* 12.5* 12.8* 13.4   MCV 83.6 82.5  --  85.1 82.5   .0* 113.0*  --  120.0* 142.0*   INR  --   --   --   --  1.18       Recent Labs   Lab 24  1428 24  0628 24  2201 24  0601 24  0623   * 98  --  75 106*   BUN 17 16  --  13 16   CREATSERUM 1.07 0.94  --  0.74 0.90   CA  8.7 8.5  --  8.6 8.7   ALB 2.7*  --   --   --  2.4*    142  --  140 140   K 3.6 3.8 3.7 3.7 3.8    113*  --  108 108   CO2 25.0 24.0  --  24.0 22.0   ALKPHO 92  --   --   --  89   AST 35  --   --   --  34   ALT 29  --   --   --  27   BILT 0.7  --   --   --  0.6   TP 7.6  --   --   --  7.1       Recent Labs   Lab 03/28/24  0623   PTP 15.0*   INR 1.18            No data recorded        Imaging: Imaging data reviewed in Epic.    Medications:    ampicillin-sulbactam  3 g Intravenous q6h    pantoprazole  40 mg Intravenous Q12H    doxycycline  100 mg Oral 2 times per day    [Held by provider] enoxaparin  40 mg Subcutaneous Daily    [Held by provider] aspirin  81 mg Oral Daily    rosuvastatin  5 mg Oral Nightly    metoprolol tartrate  25 mg Oral Daily    cholecalciferol  2,000 Units Oral Daily    hydroxychloroquine  200 mg Oral Daily       Allergies:  Allergies   Allergen Reactions    Azithromycin RASH     Face flushing       Imaging:  I have personally reviewed all pertinent available imaging.       ASSESSMENT / PLAN:     Carlos Howell is a 84 year old male with GI consult for melena, s/p EGD with esophageal varices and high risk stigmata s/p banding.     #Cirrhosis  -Likely 2/2 NAFLD. Denies ETOH.   -IgG elevated, although ASMA/AMA, and given normal liver enzymes less likely autoimmune process.   -No plan for liver biopsy given risks w/ age, co-morbid conditions outweigh benefits. Fam agrees.     #Esophageal Varices  -EV s/p banding. Hg stable.  -repeat EGD in 3-4 weeks, banding protocol.   -OK to discontinue octreotide gtt ; pt on home beta blocker.   -PPI BID x 4 weeks, then daily  -total 5 days of antibiotics     #Volume: Euvolemic, low Na diet.     #PSE: no asterixis. No indication for primary ppx.     #HCC screening:  AFP wnl. No liver mass.     #Nutrition, ok for soft diet    I discussed cirrhosis, natural course and management plan with patient's family members extensively yesterday, who are  in agreement with plan.       Disposition:   If tolerating PO, OK for discharge from GI standpoint.   Repeat EGD in 3-4 weeks per banding protocol.   GI OV in 1-2 weeks locally, in SGI   Per family discussion, referral to tertiary care hepatology center as well - will help arrange with [facility], Dr Godwin.     Lion Aguilera MD  [facility] Gastroenterology

## 2024-03-29 NOTE — PROGRESS NOTES
Mu Gonzalez,    You recently had an upper endoscopy (EGD) procedure completed with biopsies of the stomach and small intestine.     The biopsies of the stomach show non specific changes in the lining of your stomach. There is NO infection seen in these biopsy results.     Please call the office if you have any questions or concerns about these results.     Sincerely,    Lion Aguilera MD  [facility] Gastroenterology  [phone]

## 2024-03-29 NOTE — PLAN OF CARE
Problem: Patient/Family Goals  Goal: Patient/Family Long Term Goal  Description: Patient's Long Term Goal: discharge home    Interventions:  - Consult to Hosp  - PT/OT  - IV Abx, IVF  - See additional Care Plan goals for specific interventions  Outcome: Adequate for Discharge  Goal: Patient/Family Short Term Goal  Description: Patient's Short Term Goal: pain management   3/27 noc: EGD [place]  3/27: NPO at midnight, US abd tomorrow  3/28: tolerated diet  3/29 NOC: sleep    Interventions:   - Consult to Hosp  - IV Abx, IVF  - PT/OT  - See additional Care Plan goals for specific interventions  Outcome: Adequate for Discharge     Problem: PAIN - ADULT  Goal: Verbalizes/displays adequate comfort level or patient's stated pain goal  Description: INTERVENTIONS:  - Encourage pt to monitor pain and request assistance  - Assess pain using appropriate pain scale  - Administer analgesics based on type and severity of pain and evaluate response  - Implement non-pharmacological measures as appropriate and evaluate response  - Consider cultural and social influences on pain and pain management  - Manage/alleviate anxiety  - Utilize distraction and/or relaxation techniques  - Monitor for opioid side effects  - Notify MD/LIP if interventions unsuccessful or patient reports new pain  - Anticipate increased pain with activity and pre-medicate as appropriate  Outcome: Adequate for Discharge     Problem: RISK FOR INFECTION - ADULT  Goal: Absence of fever/infection during anticipated neutropenic period  Description: INTERVENTIONS  - Monitor WBC  - Administer growth factors as ordered  - Implement neutropenic guidelines  Outcome: Adequate for Discharge     Problem: SAFETY ADULT - FALL  Goal: Free from fall injury  Description: INTERVENTIONS:  - Assess pt frequently for physical needs  - Identify cognitive and physical deficits and behaviors that affect risk of falls.  - [place] fall precautions as indicated by assessment.  - Educate  pt/family on patient safety including physical limitations  - Instruct pt to call for assistance with activity based on assessment  - Modify environment to reduce risk of injury  - Provide assistive devices as appropriate  - Consider OT/PT consult to assist with strengthening/mobility  - Encourage toileting schedule  Outcome: Adequate for Discharge

## 2024-03-29 NOTE — PLAN OF CARE
Pt is A&Ox3-4. Upper/lower dentures. VSS, afebrile. SPO2 maintained on RA. Tele, NSR. EP. Lovenox- HOLD. Last BM 3/26. Full liquid diet. Incontinent, utilizes primofit. Up x1 walker. Denies pain. PIV, IV ABX. No further needs at this time, continue POC. Safety precautions in place.    Problem: Patient/Family Goals  Goal: Patient/Family Long Term Goal  Description: Patient's Long Term Goal: discharge home    Interventions:  - Consult to Hosp  - PT/OT  - IV Abx, IVF  - See additional Care Plan goals for specific interventions  Outcome: Progressing  Goal: Patient/Family Short Term Goal  Description: Patient's Short Term Goal: pain management   3/27 noc: EGD [place]  3/27: NPO at midnight, US abd tomorrow  3/28: tolerated diet  3/29 NOC: sleep    Interventions:   - Consult to Hosp  - IV Abx, IVF  - PT/OT  - See additional Care Plan goals for specific interventions  Outcome: Progressing     Problem: PAIN - ADULT  Goal: Verbalizes/displays adequate comfort level or patient's stated pain goal  Description: INTERVENTIONS:  - Encourage pt to monitor pain and request assistance  - Assess pain using appropriate pain scale  - Administer analgesics based on type and severity of pain and evaluate response  - Implement non-pharmacological measures as appropriate and evaluate response  - Consider cultural and social influences on pain and pain management  - Manage/alleviate anxiety  - Utilize distraction and/or relaxation techniques  - Monitor for opioid side effects  - Notify MD/LIP if interventions unsuccessful or patient reports new pain  - Anticipate increased pain with activity and pre-medicate as appropriate  Outcome: Progressing     Problem: RISK FOR INFECTION - ADULT  Goal: Absence of fever/infection during anticipated neutropenic period  Description: INTERVENTIONS  - Monitor WBC  - Administer growth factors as ordered  - Implement neutropenic guidelines  Outcome: Progressing     Problem: SAFETY ADULT - FALL  Goal: Free from  fall injury  Description: INTERVENTIONS:  - Assess pt frequently for physical needs  - Identify cognitive and physical deficits and behaviors that affect risk of falls.  - [place] fall precautions as indicated by assessment.  - Educate pt/family on patient safety including physical limitations  - Instruct pt to call for assistance with activity based on assessment  - Modify environment to reduce risk of injury  - Provide assistive devices as appropriate  - Consider OT/PT consult to assist with strengthening/mobility  - Encourage toileting schedule  Outcome: Progressing     Problem: DISCHARGE PLANNING  Goal: Discharge to home or other facility with appropriate resources  Description: INTERVENTIONS:  - Identify barriers to discharge w/pt and caregiver  - Include patient/family/discharge partner in discharge planning  - Arrange for needed discharge resources and transportation as appropriate  - Identify discharge learning needs (meds, wound care, etc)  - Arrange for interpreters to assist at discharge as needed  - Consider post-discharge preferences of patient/family/discharge partner  - Complete POLST form as appropriate  - Assess patient's ability to be responsible for managing their own health  - Refer to Case Management Department for coordinating discharge planning if the patient needs post-hospital services based on physician/LIP order or complex needs related to functional status, cognitive ability or social support system  Outcome: Progressing     Problem: GASTROINTESTINAL - ADULT  Goal: Minimal or absence of nausea and vomiting  Description: INTERVENTIONS:  - Maintain adequate hydration with IV or PO as ordered and tolerated  - Nasogastric tube to low intermittent suction as ordered  - Evaluate effectiveness of ordered antiemetic medications  - Provide nonpharmacologic comfort measures as appropriate  - Advance diet as tolerated, if ordered  - Obtain nutritional consult as needed  - Evaluate fluid  balance  Outcome: Progressing  Goal: Maintains or returns to baseline bowel function  Description: INTERVENTIONS:  - Assess bowel function  - Maintain adequate hydration with IV or PO as ordered and tolerated  - Evaluate effectiveness of GI medications  - Encourage mobilization and activity  - Obtain nutritional consult as needed  - Establish a toileting routine/schedule  - Consider collaborating with pharmacy to review patient's medication profile  Outcome: Progressing  Goal: Maintains adequate nutritional intake (undernourished)  Description: INTERVENTIONS:  - Monitor percentage of each meal consumed  - Identify factors contributing to decreased intake, treat as appropriate  - Assist with meals as needed  - Monitor I&O, WT and lab values  - Obtain nutritional consult as needed  - Optimize oral hygiene and moisture  - Encourage food from home; allow for food preferences  - Enhance eating environment  Outcome: Progressing

## 2024-03-29 NOTE — PROGRESS NOTES
NURSING DISCHARGE NOTE    Discharged Home via Wheelchair.  Accompanied by Family member  Belongings Taken by patient/family.

## 2024-03-29 NOTE — PHYSICAL THERAPY NOTE
PHYSICAL THERAPY TREATMENT NOTE - INPATIENT    Room Number: 505/505-A     Session: 1     Number of Visits to Meet Established Goals: 5    Presenting Problem: Generalized weakness  Co-Morbidities : ILD, falls, HTN, CAD, CABG, rheumatoid arthritis    ASSESSMENT   Patient demonstrates fair progress this session, goals  remain in progress.    Patient continues to function near baseline with bed mobility, transfers, and gait.  Contributing factors to remaining limitations include decreased functional strength and decreased endurance/aerobic capacity.  Next session anticipate patient to progress transfers and gait.  Physical Therapy will continue to follow patient for duration of hospitalization.    Patient continues to benefit from continued skilled PT services: at discharge to promote functional independence in home.  Anticipate patient will return home with home health PT.    PLAN  PT Treatment Plan: Bed mobility;Patient education;Family education;Gait training;Strengthening;Transfer training;Balance training  Rehab Potential : Good  Frequency (Obs): 3-5x/week    CURRENT GOALS     Goal #1 Patient is able to demonstrate supine - sit EOB @ level: supervision      Goal #2 Patient is able to demonstrate transfers EOB to/from BSC at assistance level: supervision      Goal #3 Patient is able to ambulate 40 feet with assist device: walker - rolling at assistance level: contact guard assist      Goal #4 Up and down a stoop w/ min a of 1.   Goal #5     Goal #6     Goal Comments: Goals established on 3/26/2024       SUBJECTIVE  \"My arms hurt\"     OBJECTIVE  Precautions: Bed/chair alarm    WEIGHT BEARING RESTRICTION  Weight Bearing Restriction: None                PAIN ASSESSMENT   Rating: Unable to rate  Location: B arms and dorsal hands  Management Techniques: Activity promotion    BALANCE                                                                                                                       Static Sitting:  Fair +  Dynamic Sitting: Fair           Static Standing: Fair -  Dynamic Standing: Poor +    ACTIVITY TOLERANCE                         O2 WALK         AM-PAC '6-Clicks' INPATIENT SHORT FORM - BASIC MOBILITY  How much difficulty does the patient currently have...  Patient Difficulty: Turning over in bed (including adjusting bedclothes, sheets and blankets)?: None   Patient Difficulty: Sitting down on and standing up from a chair with arms (e.g., wheelchair, bedside commode, etc.): A Little   Patient Difficulty: Moving from lying on back to sitting on the side of the bed?: A Little   How much help from another person does the patient currently need...   Help from Another: Moving to and from a bed to a chair (including a wheelchair)?: A Little   Help from Another: Need to walk in hospital room?: A Little   Help from Another: Climbing 3-5 steps with a railing?: A Lot       AM-PAC Score:  Raw Score: 18   Approx Degree of Impairment: 46.58%   Standardized Score (AM-PAC Scale): 43.63   CMS Modifier (G-Code): CK    FUNCTIONAL ABILITY STATUS  Gait Assessment   Functional Mobility/Gait Assessment  Gait Assistance: Contact guard assist  Distance (ft): 60  Assistive Device: Rolling walker  Pattern: Shuffle    Skilled Therapy Provided  Pt presents with good mobility when t/f from sit <-> stand - mod I; cuing required for proper hand placement while standing up.     Pt demos static balance well c any complaint of feeling fatigued - some shakiness noted, but diminished over time. Per pt, has hand tremors at baseline .     Pt performed ambulation activity well - required cuing on proper usage with RW while ambulating.      Bed Mobility:  Rolling: NT   Supine<>Sit: min A - HOB elevated ~45*   Sit<>Supine: NT     Transfer Mobility:  Sit<>Stand: Min A/CGA   Stand<>Sit: CGA   Gait: CGA    Therapist's Comments: increased time required d/t large,loose BM.  Pt required max A for brief change clean up, tolerates static stance for  prolonged time c RW , no c/o dizziness, no LOB noted - t/f to/from commode CGA c RW - cues sequencing   Pt states he does not have an adequate AD- PT aide to vend RW   RN present at end of session      THERAPEUTIC EXERCISES  Lower Extremity LAQ     Upper Extremity      Position      Repetitions   5   Sets   1     Patient End of Session: Up in chair;Needs met;Call light within reach;RN aware of session/findings;All patient questions and concerns addressed;Alarm set    PT Session Time: 30 minutes  Gait Training: 10 minutes  Therapeutic Activity: 10 minutes  Therapeutic Exercise: 10 minutes   Neuromuscular Re-education:  minutes

## 2024-03-30 NOTE — DISCHARGE SUMMARY
[facility]PITAL[facility]  DISCHARGE SUMMARY     Carlos Howell Patient Status:  Inpatient    3/26/1940 MRN [id number]   Location [facility] 5NW-A Attending No att. providers found   Hosp Day # 4 PCP Simba Nunez MD     Date of Admission: 3/25/2024  Date of Discharge: 3/29/2024    Discharge Disposition: Home or Self Care    Admitting Diagnosis:   Weakness generalized [R53.1]    Hospital Discharge Diagnoses:  #Weakness and fever  #Pneumonia  #Lactic acidosis, resolved  -CXR reviewed, continue unasyn. Reported flushing with azithromycin. Switched to doxycycline     #Possible adrenal insufficiency on admission given chronic steroid use- ruled out   -Patient had stopped taking his prednisone 1 month ago per family. Last fill was noted to be dispensed in October and at 2.5mg. Was given 100mg solu-cortef in ER, will not continue at this time. Cortisol is back and elevated     #Dark stool  -GI following  -s/p EGD with severe esophageal varices s/p banding  -PPI  -Hold ASA and lovenox for now  -cont abx for 5 days total   -Octreotide gtt stopped today   -RUQ US- cirrhotic morphology. LFTs unremarkable, INR 1.1      #CAD s/p CABG - asa held, metoprolol, statin  #Essential HTN - metoprolol  #HLD - statin  #Rheumatoid Arthritis - Plaquenil, has f/u appointment with rheum in May     DC planning today- d/w GI   F/u GI as outpt for repeat EGD      Lace+ Score: 68  59-90 High Risk  29-58 Medium Risk  0-28   Low Risk.     History of Present Illness: Carlos Howell is a 83 year old male with a PMH of CAD, RA, HTN who presents to the ER with generalized weakness and fever.  Patient states he has been weak for about 1 week.  Is also been having some pain in his shoulders which is chronic but worse than normal.  He had a fever today.  Denies any cough, no shortness of breath, no chest pain.  No recent nausea vomiting, diarrhea.  Patient recently stopped taking his prednisone.  He has no other acute complaints  at this time.     Brief Synopsis: Patient was placed on IV antibiotics for pneumonia.  Patient had melena in which GI was placed on consultation.  Patient underwent EGD with profound esophageal varices status post banding.  Patient was placed on octreotide drip and antibiotics continued.  Patient continued to improve throughout the hospital course.  He was on room air.  Ultrasound of the abdomen was performed per GI had cirrhotic morphology.  Patient will be followed closely as outpatient with GI with repeat EGD per their timing.  Patient was cleared by GI service and was discharged in stable condition.    Procedures during hospitalization:   EGD with banding    Lab/Test results pending at Discharge:   None    Consultants:  GI    Discharge Medication List:     Discharge Medications        START taking these medications        Instructions Prescription details   amoxicillin clavulanate 875-125 MG Tabs  Commonly known as: Augmentin      Take 1 tablet by mouth 2 (two) times daily.   Quantity: 3 tablet  Refills: 0     doxycycline 100 MG Caps  Commonly known as: Vibramycin      Take 1 capsule (100 mg total) by mouth every 12 (twelve) hours.   Quantity: 3 capsule  Refills: 0     pantoprazole 40 MG Tbec  Commonly known as: Protonix      Take 1 tablet (40 mg total) by mouth 2 (two) times daily before meals.   Quantity: 60 tablet  Refills: 0            CONTINUE taking these medications        Instructions Prescription details   hydroxychloroquine 200 MG Tabs  Commonly known as: Plaquenil      Take 1 tablet (200 mg total) by mouth daily.   Refills: 0     metoprolol tartrate 25 MG Tabs  Commonly known as: Lopressor      Take 1 tablet (25 mg total) by mouth daily.   Quantity: 90 tablet  Refills: 1     rosuvastatin 5 MG Tabs  Commonly known as: Crestor      Take 1 tablet (5 mg total) by mouth nightly.   Refills: 0     rosuvastatin 5 MG Tabs  Commonly known as: Crestor      Take 1 tablet (5 mg total) by mouth nightly.   Quantity:  90 tablet  Refills: 1     Vitamin D 50 MCG (2000 UT) Tabs      Take 2,000 Units by mouth daily.   Refills: 0            STOP taking these medications      aspirin 81 MG Tbec        erythromycin 5 MG/GM Oint  Commonly known as: Romycin        Esomeprazole Magnesium 40 MG Cpdr  Commonly known as: NEXIUM        predniSONE 2.5 MG Tabs  Commonly known as: Deltasone                  Where to Get Your Medications        These medications were sent to [id number] - [place], [place] - [address] [address] [phone], [phone] [address] [address] [address]      Phone: [phone]   amoxicillin clavulanate 875-125 MG Tabs  doxycycline 100 MG Caps  pantoprazole 40 MG Tbec         Follow-up appointment:   Lion Aguilera MD  [address] [place] [address] [phone]    Go in 3 week(s)  for a repeat EGD with GI. The office will call you to arrange the date/time of your procedure      -----------------------------------------------------------------------------------------------  PATIENT DISCHARGE INSTRUCTIONS: See electronic chart    Abdullahi Severino MD 3/30/2024    Time spent: 33 minutes

## 2024-04-01 ENCOUNTER — PATIENT OUTREACH (OUTPATIENT)
Dept: CASE MANAGEMENT | Age: 84
End: 2024-04-01

## 2024-04-01 DIAGNOSIS — Z02.9 ENCOUNTERS FOR UNSPECIFIED ADMINISTRATIVE PURPOSE: Primary | ICD-10-CM

## 2024-04-01 NOTE — HOME CARE LIAISON
Received referral via [facility] for Home Health services. Spoke w/ patients daughter who is agreeable with [facility]. Contact information placed on AVS.

## 2024-04-01 NOTE — PROGRESS NOTES
[facility]  [facility] contact information provided as well as [facility] office number, [phone].

## 2024-04-02 LAB — A-1-ANTITRYPSIN: 199 MG/DL

## 2024-04-03 ENCOUNTER — LAB ENCOUNTER (OUTPATIENT)
Dept: LAB | Age: 84
End: 2024-04-03
Attending: FAMILY MEDICINE
Payer: MEDICARE

## 2024-04-03 ENCOUNTER — OFFICE VISIT (OUTPATIENT)
Dept: FAMILY MEDICINE CLINIC | Facility: CLINIC | Age: 84
End: 2024-04-03
Payer: MEDICARE

## 2024-04-03 VITALS
HEART RATE: 74 BPM | BODY MASS INDEX: 25.61 KG/M2 | RESPIRATION RATE: 18 BRPM | WEIGHT: 150 LBS | SYSTOLIC BLOOD PRESSURE: 110 MMHG | DIASTOLIC BLOOD PRESSURE: 58 MMHG | OXYGEN SATURATION: 95 % | HEIGHT: 64 IN

## 2024-04-03 DIAGNOSIS — I10 ESSENTIAL HYPERTENSION: ICD-10-CM

## 2024-04-03 DIAGNOSIS — E78.2 MIXED HYPERLIPIDEMIA: ICD-10-CM

## 2024-04-03 DIAGNOSIS — K74.60 CIRRHOSIS OF LIVER WITHOUT ASCITES, UNSPECIFIED HEPATIC CIRRHOSIS TYPE (HCC): Primary | ICD-10-CM

## 2024-04-03 DIAGNOSIS — I85.01 BLEEDING ESOPHAGEAL VARICES, UNSPECIFIED ESOPHAGEAL VARICES TYPE (HCC): ICD-10-CM

## 2024-04-03 DIAGNOSIS — J18.9 PNEUMONIA DUE TO INFECTIOUS ORGANISM, UNSPECIFIED LATERALITY, UNSPECIFIED PART OF LUNG: ICD-10-CM

## 2024-04-03 DIAGNOSIS — M06.9 RHEUMATOID ARTHRITIS INVOLVING MULTIPLE SITES, UNSPECIFIED WHETHER RHEUMATOID FACTOR PRESENT (HCC): ICD-10-CM

## 2024-04-03 DIAGNOSIS — Z00.00 LABORATORY EXAMINATION ORDERED AS PART OF A ROUTINE GENERAL MEDICAL EXAMINATION: ICD-10-CM

## 2024-04-03 DIAGNOSIS — I25.10 CORONARY ARTERY DISEASE INVOLVING NATIVE CORONARY ARTERY OF NATIVE HEART WITHOUT ANGINA PECTORIS: ICD-10-CM

## 2024-04-03 LAB
BASOPHILS # BLD AUTO: 0.08 X10(3) UL (ref 0–0.2)
BASOPHILS NFR BLD AUTO: 1.2 %
CHOLEST SERPL-MCNC: 96 MG/DL (ref ?–200)
EOSINOPHIL # BLD AUTO: 0.21 X10(3) UL (ref 0–0.7)
EOSINOPHIL NFR BLD AUTO: 3.3 %
ERYTHROCYTE [DISTWIDTH] IN BLOOD BY AUTOMATED COUNT: 15.9 %
FASTING PATIENT LIPID ANSWER: YES
HCT VFR BLD AUTO: 41.8 %
HDLC SERPL-MCNC: 37 MG/DL (ref 40–59)
HGB BLD-MCNC: 13.4 G/DL
IMM GRANULOCYTES # BLD AUTO: 0.09 X10(3) UL (ref 0–1)
IMM GRANULOCYTES NFR BLD: 1.4 %
LDLC SERPL CALC-MCNC: 45 MG/DL (ref ?–100)
LYMPHOCYTES # BLD AUTO: 1.63 X10(3) UL (ref 1–4)
LYMPHOCYTES NFR BLD AUTO: 25.2 %
MCH RBC QN AUTO: 27.2 PG (ref 26–34)
MCHC RBC AUTO-ENTMCNC: 32.1 G/DL (ref 31–37)
MCV RBC AUTO: 85 FL
MONOCYTES # BLD AUTO: 0.62 X10(3) UL (ref 0.1–1)
MONOCYTES NFR BLD AUTO: 9.6 %
NEUTROPHILS # BLD AUTO: 3.83 X10 (3) UL (ref 1.5–7.7)
NEUTROPHILS # BLD AUTO: 3.83 X10(3) UL (ref 1.5–7.7)
NEUTROPHILS NFR BLD AUTO: 59.3 %
NONHDLC SERPL-MCNC: 59 MG/DL (ref ?–130)
PLATELET # BLD AUTO: 182 10(3)UL (ref 150–450)
RBC # BLD AUTO: 4.92 X10(6)UL
T4 FREE SERPL-MCNC: 1.1 NG/DL (ref 0.8–1.7)
TRIGL SERPL-MCNC: 59 MG/DL (ref 30–149)
TSI SER-ACNC: 4.83 MIU/ML (ref 0.36–3.74)
VLDLC SERPL CALC-MCNC: 8 MG/DL (ref 0–30)
WBC # BLD AUTO: 6.5 X10(3) UL (ref 4–11)

## 2024-04-03 PROCEDURE — 84443 ASSAY THYROID STIM HORMONE: CPT

## 2024-04-03 PROCEDURE — 85025 COMPLETE CBC W/AUTO DIFF WBC: CPT

## 2024-04-03 PROCEDURE — 80061 LIPID PANEL: CPT

## 2024-04-03 PROCEDURE — 99214 OFFICE O/P EST MOD 30 MIN: CPT | Performed by: FAMILY MEDICINE

## 2024-04-03 PROCEDURE — 36415 COLL VENOUS BLD VENIPUNCTURE: CPT

## 2024-04-03 PROCEDURE — 84439 ASSAY OF FREE THYROXINE: CPT

## 2024-04-03 RX ORDER — ROSUVASTATIN CALCIUM 5 MG/1
5 TABLET, COATED ORAL NIGHTLY
Qty: 90 TABLET | Refills: 1 | Status: SHIPPED | OUTPATIENT
Start: 2024-04-03

## 2024-04-03 RX ORDER — METOPROLOL SUCCINATE 25 MG/1
25 TABLET, EXTENDED RELEASE ORAL DAILY
COMMUNITY
End: 2024-04-09 | Stop reason: ALTCHOICE

## 2024-04-03 RX ORDER — HYDROXYCHLOROQUINE SULFATE 200 MG/1
200 TABLET, FILM COATED ORAL DAILY
Qty: 60 TABLET | Refills: 0 | Status: SHIPPED | OUTPATIENT
Start: 2024-04-03

## 2024-04-03 RX ORDER — ALBUTEROL SULFATE 90 UG/1
2 AEROSOL, METERED RESPIRATORY (INHALATION) EVERY 6 HOURS PRN
Qty: 1 EACH | Refills: 0 | Status: SHIPPED | OUTPATIENT
Start: 2024-04-03

## 2024-04-03 NOTE — PROGRESS NOTES
Multiple attempts to reach pt and messages left with no return call.  Patient went in for HFU appt with PCP on 4/3/24.  Encounter closing.

## 2024-04-05 ENCOUNTER — TELEPHONE (OUTPATIENT)
Dept: FAMILY MEDICINE CLINIC | Facility: CLINIC | Age: 84
End: 2024-04-05

## 2024-04-05 NOTE — TELEPHONE ENCOUNTER
Kylee CARR, [facility] called to inform you [facility] started yesterday.

## 2024-04-09 ENCOUNTER — TELEPHONE (OUTPATIENT)
Dept: FAMILY MEDICINE CLINIC | Facility: CLINIC | Age: 84
End: 2024-04-09

## 2024-04-09 NOTE — TELEPHONE ENCOUNTER
Estephania called to inform us patient was evaluated for Physical therapy home health. They will be faxing us a detail plan of care.

## 2024-04-11 ENCOUNTER — TELEPHONE (OUTPATIENT)
Dept: FAMILY MEDICINE CLINIC | Facility: CLINIC | Age: 84
End: 2024-04-11

## 2024-04-11 ENCOUNTER — HOSPITAL ENCOUNTER (OUTPATIENT)
Dept: GENERAL RADIOLOGY | Age: 84
Discharge: HOME | End: 2024-04-11
Attending: FAMILY MEDICINE
Payer: MEDICARE

## 2024-04-11 DIAGNOSIS — J18.9 PNEUMONIA DUE TO INFECTIOUS ORGANISM, UNSPECIFIED LATERALITY, UNSPECIFIED PART OF LUNG: ICD-10-CM

## 2024-04-11 PROCEDURE — 71046 X-RAY EXAM CHEST 2 VIEWS: CPT | Performed by: FAMILY MEDICINE

## 2024-04-11 NOTE — TELEPHONE ENCOUNTER
Called pt, talked with daughter Henna (on HIPAA). Was informed of results. Questions answered and daughter verbalized understanding.

## 2024-04-12 ENCOUNTER — HOME HEALTH CHARGES (OUTPATIENT)
Dept: FAMILY MEDICINE CLINIC | Facility: CLINIC | Age: 84
End: 2024-04-12

## 2024-04-12 ENCOUNTER — MED REC SCAN ONLY (OUTPATIENT)
Dept: FAMILY MEDICINE CLINIC | Facility: CLINIC | Age: 84
End: 2024-04-12

## 2024-04-12 DIAGNOSIS — J18.8 OTHER PNEUMONIA, UNSPECIFIED ORGANISM: Primary | ICD-10-CM

## 2024-04-17 ENCOUNTER — TELEPHONE (OUTPATIENT)
Dept: FAMILY MEDICINE CLINIC | Facility: CLINIC | Age: 84
End: 2024-04-17

## 2024-04-17 NOTE — TELEPHONE ENCOUNTER
Called daughter and informed her of result/recommendation from provider. No questions and daughter verbalized understanding.

## 2024-04-17 NOTE — TELEPHONE ENCOUNTER
----- Message from Simba Nunez MD sent at 4/11/2024  3:26 PM CDT -----  PNA is improving. Continue supportive care measures.

## 2024-04-22 NOTE — PROGRESS NOTES
[facility] Office Note  Chief Complaint:   Chief Complaint   Patient presents with    Hospital F/U     Has not scheduled appt with hepatologist yet,     Weakness    Medication Request     Pt requesting Plaquenil refill , Pt has appt scheduled to see Rheumo as new Pt until May, requesting temporary refill until seen        HPI:   This is a 84 year old male coming in for hospital follow up. Presented to  ER on  for 1 week history of generalized weakness and fevers. Found to have CAP, started on IV abx. Had melena so had a EGD which showed bleeding esophageal varices s/p banding. He was also started on octreotide drip. His condition improved. Found to have cirrhotic liver on imaging. He has discharged to home in stable condition. He is to follow up with GI outpatient.     Reports continued feeling of weakness. No fevers. No dyspnea. No chest pains. Has not scheduled appt w/ GI yet. Has been tolerating PO intake. No melena, hematochezia. No abd pain. No n/v/d/c.     Past Medical History:    Anesthesia complication    post-op hiccups    Arthritis    CAD (coronary artery disease)    s/p CABG    Cancer (HCC)    skin    Current episode of major depressive disorder without prior episode    Disorder of liver    cirrhosis    Esophageal reflux    Hearing impairment    High blood pressure    High cholesterol    History of 2019 novel coronavirus disease (COVID-19)    HTN (hypertension)    Rheumatoid arthritis (HCC)     Past Surgical History:   Procedure Laterality Date    Appendectomy      Bypass surgery      Cabg      x4 vessels    Repair ing hernia,5+y/o,reducibl       Social History:  Social History     Socioeconomic History    Marital status:    Tobacco Use    Smoking status: Former     Current packs/day: 0.00     Average packs/day: 1 pack/day for 50.0 years (50.0 ttl pk-yrs)     Types: Cigarettes     Start date: 1956     Quit date: 3/26/2005     Years since quittin.1     Smokeless tobacco: Never    Tobacco comments:     quit smoking about 6 yrs ago   Vaping Use    Vaping status: Never Used   Substance and Sexual Activity    Alcohol use: Not Currently    Drug use: Never   Other Topics Concern    Caffeine Concern No    Exercise Yes    Seat Belt Yes    Special Diet No    Stress Concern No    Weight Concern No     Social Determinants of Health     Food Insecurity: No Food Insecurity (3/25/2024)    Food Insecurity     Food Insecurity: Never true   Transportation Needs: No Transportation Needs (3/25/2024)    Transportation Needs     Lack of Transportation: No   Housing Stability: Low Risk  (3/25/2024)    Housing Stability     Housing Instability: No     Family History:  Family History   Problem Relation Age of Onset    Other (Other) Father      Allergies:  Allergies   Allergen Reactions    Azithromycin RASH     Face flushing     Current Meds:  Current Outpatient Medications   Medication Sig Dispense Refill    rosuvastatin 5 MG Oral Tab Take 1 tablet (5 mg total) by mouth nightly. 90 tablet 1    metoprolol tartrate 25 MG Oral Tab Take 1 tablet (25 mg total) by mouth daily. 90 tablet 1    hydroxychloroquine 200 MG Oral Tab Take 1 tablet (200 mg total) by mouth daily. 60 tablet 0    albuterol 108 (90 Base) MCG/ACT Inhalation Aero Soln Inhale 2 puffs into the lungs every 6 (six) hours as needed for Wheezing. 1 each 0    Cholecalciferol (VITAMIN D) 50 MCG (2000 UT) Oral Tab Take 2,000 Units by mouth daily.      pantoprazole 40 MG Oral Tab EC TAKE 1 TABLET BY MOUTH 2 TIMES A DAY BEFORE MEALS 180 tablet 0      Counseling given: Not Answered  Tobacco comments: quit smoking about 6 yrs ago       REVIEW OF SYSTEMS:   Review of Systems   A comprehensive 10 point review of systems was completed.  Pertinent positives and negatives noted in the the HPI.    EXAM:   /58   Pulse 74   Resp 18   Ht 5' 4\" (1.626 m)   Wt 150 lb (68 kg)   SpO2 95%   BMI 25.75 kg/m²  Estimated body mass index is  25.75 kg/m² as calculated from the following:    Height as of this encounter: 5' 4\" (1.626 m).    Weight as of this encounter: 150 lb (68 kg).   Vital signs reviewed.Appears stated age, well groomed.  Physical Exam  Vitals and nursing note reviewed.   Constitutional:       Appearance: Normal appearance.   Cardiovascular:      Rate and Rhythm: Normal rate and regular rhythm.      Pulses: Normal pulses.      Heart sounds: Normal heart sounds. No murmur heard.  Pulmonary:      Effort: Pulmonary effort is normal. No respiratory distress.      Breath sounds: Normal breath sounds. No stridor. No wheezing or rhonchi.   Abdominal:      General: Abdomen is flat. Bowel sounds are normal. There is no distension.      Palpations: Abdomen is soft. There is no mass.      Tenderness: There is no abdominal tenderness.      Hernia: No hernia is present.   Skin:     Findings: No rash.   Neurological:      Mental Status: He is alert and oriented to person, place, and time.   Psychiatric:         Mood and Affect: Mood normal.        ASSESSMENT AND PLAN:   1. Cirrhosis of liver without ascites, unspecified hepatic cirrhosis type (HCC)  Referral placed for hepatology. Advised against tylenol use. Avoid any alcohol.   - Hepatology - In Network    2. Laboratory examination ordered as part of a routine general medical examination  - Lipid Panel; Future  - TSH W Reflex To Free T4; Future    3. Mixed hyperlipidemia  Continue statin for now pending hepatology evaluation.   - rosuvastatin 5 MG Oral Tab; Take 1 tablet (5 mg total) by mouth nightly.  Dispense: 90 tablet; Refill: 1    4. Essential hypertension  Stable, CPM  - metoprolol tartrate 25 MG Oral Tab; Take 1 tablet (25 mg total) by mouth daily.  Dispense: 90 tablet; Refill: 1    5. Coronary artery disease involving native coronary artery of native heart without angina pectoris  Needs new cardiology referral, no anginal sx.   - Cardio Referral - Internal    6. Bleeding esophageal varices,  unspecified esophageal varices type (HCC)  Will recheck CBC. No melena, hematochezia. Refer to hepatology as above.   - CBC With Differential With Platelet; Future    7. Rheumatoid arthritis involving multiple sites, unspecified whether rheumatoid factor present (HCC)  Has new appt w/ rheumatologist, continue present mgmt for now.   - hydroxychloroquine 200 MG Oral Tab; Take 1 tablet (200 mg total) by mouth daily.  Dispense: 60 tablet; Refill: 0    8. Pneumonia due to infectious organism, unspecified laterality, unspecified part of lung  Resp exam stable. Will recheck CXR. Advised on spirometer use. Will add on albuterol prn. Reviewed respiratory therapy exercises. Reviewed return/call back precautions. He will be starting HHC soon as well.   - XR CHEST PA + LAT CHEST (CPT=71046); Future  - albuterol 108 (90 Base) MCG/ACT Inhalation Aero Soln; Inhale 2 puffs into the lungs every 6 (six) hours as needed for Wheezing.  Dispense: 1 each; Refill: 0    F/u 3mo or sooner prn.     Meds & Refills for this Visit:  Requested Prescriptions     Signed Prescriptions Disp Refills    rosuvastatin 5 MG Oral Tab 90 tablet 1     Sig: Take 1 tablet (5 mg total) by mouth nightly.    metoprolol tartrate 25 MG Oral Tab 90 tablet 1     Sig: Take 1 tablet (25 mg total) by mouth daily.    hydroxychloroquine 200 MG Oral Tab 60 tablet 0     Sig: Take 1 tablet (200 mg total) by mouth daily.    albuterol 108 (90 Base) MCG/ACT Inhalation Aero Soln 1 each 0     Sig: Inhale 2 puffs into the lungs every 6 (six) hours as needed for Wheezing.       Health Maintenance:  Health Maintenance Due   Topic Date Due    Zoster Vaccines (1 of 2) Never done    COVID-19 Vaccine (4 - 2023-24 season) 09/01/2023    Annual Depression Screening  01/01/2024    Annual Physical  04/03/2024       Patient/Caregiver Education: Patient/Caregiver Education: There are no barriers to learning. Medical education done.   Outcome: Patient verbalizes understanding. Patient is  notified to call with any questions, complications, allergies, or worsening or changing symptoms.  Patient is to call with any side effects or complications from the treatments as a result of today.     Problem List:  Patient Active Problem List   Diagnosis    Rheumatoid arthritis (HCC)    Gastroesophageal reflux disease with esophagitis without hemorrhage    Coronary artery disease involving native coronary artery of native heart without angina pectoris    Essential hypertension    Falling episodes    Pneumonia of both lungs due to infectious organism    Interstitial lung disease (HCC)    Weakness generalized    Esophageal varices without bleeding (HCC)

## 2024-04-29 RX ORDER — PANTOPRAZOLE SODIUM 40 MG/1
40 TABLET, DELAYED RELEASE ORAL
Qty: 180 TABLET | Refills: 0 | Status: SHIPPED | OUTPATIENT
Start: 2024-04-29

## 2024-05-01 ENCOUNTER — HOSPITAL ENCOUNTER (OUTPATIENT)
Facility: HOSPITAL | Age: 84
Setting detail: HOSPITAL OUTPATIENT SURGERY
Discharge: HOME | End: 2024-05-01
Attending: STUDENT IN AN ORGANIZED HEALTH CARE EDUCATION/TRAINING PROGRAM | Admitting: STUDENT IN AN ORGANIZED HEALTH CARE EDUCATION/TRAINING PROGRAM
Payer: MEDICARE

## 2024-05-01 ENCOUNTER — ANESTHESIA EVENT (OUTPATIENT)
Dept: ENDOSCOPY | Facility: HOSPITAL | Age: 84
End: 2024-05-01
Payer: MEDICARE

## 2024-05-01 ENCOUNTER — ANESTHESIA (OUTPATIENT)
Dept: ENDOSCOPY | Facility: HOSPITAL | Age: 84
End: 2024-05-01
Payer: MEDICARE

## 2024-05-01 VITALS
WEIGHT: 150 LBS | RESPIRATION RATE: 18 BRPM | HEIGHT: 64 IN | OXYGEN SATURATION: 94 % | TEMPERATURE: 100 F | HEART RATE: 84 BPM | SYSTOLIC BLOOD PRESSURE: 119 MMHG | DIASTOLIC BLOOD PRESSURE: 99 MMHG | BODY MASS INDEX: 25.61 KG/M2

## 2024-05-01 PROCEDURE — 0DJ08ZZ INSPECTION OF UPPER INTESTINAL TRACT, VIA NATURAL OR ARTIFICIAL OPENING ENDOSCOPIC: ICD-10-PCS | Performed by: STUDENT IN AN ORGANIZED HEALTH CARE EDUCATION/TRAINING PROGRAM

## 2024-05-01 RX ORDER — SODIUM CHLORIDE, SODIUM LACTATE, POTASSIUM CHLORIDE, CALCIUM CHLORIDE 600; 310; 30; 20 MG/100ML; MG/100ML; MG/100ML; MG/100ML
INJECTION, SOLUTION INTRAVENOUS CONTINUOUS
Status: DISCONTINUED | OUTPATIENT
Start: 2024-05-01 | End: 2024-05-01

## 2024-05-01 RX ORDER — HYDROMORPHONE HYDROCHLORIDE 1 MG/ML
0.6 INJECTION, SOLUTION INTRAMUSCULAR; INTRAVENOUS; SUBCUTANEOUS EVERY 5 MIN PRN
Status: DISCONTINUED | OUTPATIENT
Start: 2024-05-01 | End: 2024-05-01

## 2024-05-01 RX ORDER — ONDANSETRON 2 MG/ML
4 INJECTION INTRAMUSCULAR; INTRAVENOUS EVERY 6 HOURS PRN
Status: DISCONTINUED | OUTPATIENT
Start: 2024-05-01 | End: 2024-05-01

## 2024-05-01 RX ORDER — METOCLOPRAMIDE HYDROCHLORIDE 5 MG/ML
10 INJECTION INTRAMUSCULAR; INTRAVENOUS EVERY 8 HOURS PRN
Status: DISCONTINUED | OUTPATIENT
Start: 2024-05-01 | End: 2024-05-01

## 2024-05-01 RX ORDER — HYDROMORPHONE HYDROCHLORIDE 1 MG/ML
0.2 INJECTION, SOLUTION INTRAMUSCULAR; INTRAVENOUS; SUBCUTANEOUS EVERY 5 MIN PRN
Status: DISCONTINUED | OUTPATIENT
Start: 2024-05-01 | End: 2024-05-01

## 2024-05-01 RX ORDER — NALOXONE HYDROCHLORIDE 0.4 MG/ML
0.08 INJECTION, SOLUTION INTRAMUSCULAR; INTRAVENOUS; SUBCUTANEOUS AS NEEDED
Status: DISCONTINUED | OUTPATIENT
Start: 2024-05-01 | End: 2024-05-01

## 2024-05-01 RX ORDER — HYDROMORPHONE HYDROCHLORIDE 1 MG/ML
0.4 INJECTION, SOLUTION INTRAMUSCULAR; INTRAVENOUS; SUBCUTANEOUS EVERY 5 MIN PRN
Status: DISCONTINUED | OUTPATIENT
Start: 2024-05-01 | End: 2024-05-01

## 2024-05-01 RX ORDER — NALOXONE HYDROCHLORIDE 0.4 MG/ML
0.08 INJECTION, SOLUTION INTRAMUSCULAR; INTRAVENOUS; SUBCUTANEOUS ONCE AS NEEDED
Status: DISCONTINUED | OUTPATIENT
Start: 2024-05-01 | End: 2024-05-01

## 2024-05-01 RX ORDER — LIDOCAINE HYDROCHLORIDE 10 MG/ML
INJECTION, SOLUTION EPIDURAL; INFILTRATION; INTRACAUDAL; PERINEURAL AS NEEDED
Status: DISCONTINUED | OUTPATIENT
Start: 2024-05-01 | End: 2024-05-01 | Stop reason: SURG

## 2024-05-01 RX ADMIN — LIDOCAINE HYDROCHLORIDE 50 MG: 10 INJECTION, SOLUTION EPIDURAL; INFILTRATION; INTRACAUDAL; PERINEURAL at 13:50:00

## 2024-05-01 NOTE — OPERATIVE REPORT
EGD Operative Report  Patient Name: Carlos Howell  YOB: 1940  MRN: [id number]  Procedure: Esophagogastroduodenoscopy (EGD)    Pre-operative Diagnosis & Indication:   Esophageal variceal banding protocol  Cirrhosis  Post-operative Diagnosis:  Portal hypertensive gastropathy  Cirrhosis   Attending Endoscopist: Lion Aguilera M.D.  Informed Consent: The planned procedure(s), the explanation of the procedure, its expected benefits, the potential complications and risks and possible alternatives and their benefits and risks were discussed with the patient or the patient's surrogate. The discussion of risks, not limited to but including bleeding, infection, perforation, adverse effects from anesthesia, need for emergency surgery/prolonged hospitalization,  cardiac arrhythmias,  and aspiration were discussed with patient.  Pt and/or surrogate understood the proposed procedure(s), its risks, benefits and alternatives and wish to proceed with procedure(s). All questions answered in full.  After all questions were answered to their satisfaction, a signed, informed, and witnessed consent was obtained.  Physical Exam: Heart: regular rate and rhythm. No rubs, murmurs, or gallops. Lungs: Clear to auscultation bilaterally. Abdomen: Soft, non-tender, non distended. No rebound tenderness, no guarding.   A TIME OUT WAS COMPLETED prior to the procedure to confirm the patient, procedure(s) and complete endoscopy safety procedure.  Sedation: Monitored Anesthesia Care; ASA class per anesthesiology team   Monitoring: Pulsoximetry, pulse, respirations, and blood pressure , vitals were monitored throughout the entire procedure under monitored anesthesia care.   Procedure: The patient was then brought to the endoscopy suite where his/her pulse, pulse oximetry and blood pressure were monitored. The patient was placed in the left lateral decubitus position and deep sedation was administered. Once adequate sedation was  achieved, a bite block was placed and a lubricated tip of an Olympus video upper endoscope was inserted through the oropharynx and gently manipulated through the esophagus into the stomach and the second portion of the duodenum. Upon withdrawal of the endoscope, careful visualization of the mucosa was performed. The endoscope was then withdrawn into the gastric antrum and placed in a retroflexed position.  The endoscope was then righted, and air was suctioned from the stomach.  The endoscope was then withdrawn from the patient, with careful visual inspection of the mucosa. The patient left the procedure room in stable condition for recovery. Findings and endotherapy as listed below  Toleration: Patient tolerated procedure well   Complications: No immediate complications   Technical Difficulty:  The procedure was not technically difficult   Estimated Blood Loss: Minimal, less than 5mL of estimated blood loss.   Findings and Therapeutics:  Esophagus:   Prior esophageal banding scar noted in the distal esophagus. No varices visualized with complete insufflation.   There were no strictures or stenosis. GEJ junction traversed with endoscope without resistance.  The Z-line was irregular, appreciated at 34 cm from the incisors.  Diaphragmatic impression noted at 40cm from the incisors. 6cm hiatal hernia.   Stomach:    Portal hypertensive gastropathy, mild.    Endoscope was placed in a retroflexion view in the stomach. No varices. There was  no evidence of a hiatal hernia.   Duodenum:   Duodenal diverticula in the second portion, large. The entire examined duodenum was normal    Recommendations:  Post EGD precautions, watch for bleeding, infection, perforation, adverse drug reactions   Follow up with outpatient hepatology team, tertiary care center, as scheduled   Repeat EGD in 3 MONTHS    Lion Aguilera MD  5/1/2024  2:01 PM

## 2024-05-01 NOTE — H&P
[facility] History and Physical Procedure Note    CC: EGD    History of Present Illness: Carlos Howell is a 84 year old male who presents for a  EGD.    Indication:   Cirrhosis  History of esophageal varices, banding protocol.      No First Degree Relative with a history of Colorectal Cancer         No overt Blood in stool   No Constipation   No Diarrhea   No Abdominal pain     No Reflux symptoms   No Dysphagia       Medications reviewed as below:   No non-aspirin antithrombotic agents    Medications:  No current outpatient medications on file.    Past Medical History:  Past Medical History:    Anesthesia complication    post-op hiccups    Arthritis    CAD (coronary artery disease)    s/p CABG    Cancer (HCC)    skin    Current episode of major depressive disorder without prior episode    Disorder of liver    cirrhosis    Esophageal reflux    Hearing impairment    High blood pressure    High cholesterol    History of 2019 novel coronavirus disease (COVID-19)    HTN (hypertension)    Rheumatoid arthritis (HCC)       Past Surgical History:  Past Surgical History:   Procedure Laterality Date    Appendectomy      Bypass surgery      Cabg      x4 vessels    Repair ing hernia,6+y/o,reducibl         Family History:  Family History   Problem Relation Age of Onset    Other (Other) Father        Social History:  Social History     Socioeconomic History    Marital status:    Tobacco Use    Smoking status: Former     Current packs/day: 0.00     Average packs/day: 1 pack/day for 50.0 years (50.0 ttl pk-yrs)     Types: Cigarettes     Start date: 1956     Quit date: 3/26/2005     Years since quittin.1    Smokeless tobacco: Never    Tobacco comments:     quit smoking about 6 yrs ago   Vaping Use    Vaping status: Never Used   Substance and Sexual Activity    Alcohol use: Not Currently    Drug use: Never   Other Topics Concern    Caffeine Concern No    Exercise Yes    Seat Belt Yes     Special Diet No    Stress Concern No    Weight Concern No     Social Determinants of Health     Food Insecurity: No Food Insecurity (3/25/2024)    Food Insecurity     Food Insecurity: Never true   Transportation Needs: No Transportation Needs (3/25/2024)    Transportation Needs     Lack of Transportation: No   Housing Stability: Low Risk  (3/25/2024)    Housing Stability     Housing Instability: No       Review of Systems  Negative unless otherwise mentioned in HPI.     Allergies:  Allergies   Allergen Reactions    Azithromycin RASH     Face flushing         Objective:    Physical Exam  /68 (BP Location: Left arm)   Pulse 82   Temp 99.7 °F (37.6 °C) (Temporal)   Resp 18   Ht 5' 4\" (1.626 m)   Wt 150 lb (68 kg)   SpO2 95%   BMI 25.75 kg/m²   Body mass index is 25.75 kg/m².    Gen: Awake and alert, NAD  CV: Ext warm b/l  Resp: no resp distress; CTAB, no crackles at lung bases  Abd: soft NTND, no rebound tenderness, no guarding  Neuro: NAD, Aox3.     Pertinent labs:   Lab Results   Component Value Date     03/28/2024    K 3.8 03/28/2024     03/28/2024    CO2 22.0 03/28/2024    ANIONGAP 10 03/28/2024    BUN 16 03/28/2024    GLUCOSE 94 04/01/2009     Lab Results   Component Value Date    WBC 6.5 04/03/2024    HGB 13.4 04/03/2024    HCT 41.8 04/03/2024    MCV 85.0 04/03/2024    .0 04/03/2024     Lab Results   Component Value Date    AST 34 03/28/2024    ALT 27 03/28/2024    CRP 0.99 (H) 11/08/2020    ALB 2.4 (L) 03/28/2024     Lab Results   Component Value Date    INR 1.18 03/28/2024       Imaging:  XR CHEST PA + LAT CHEST (BYC=31730)  Narrative: PROCEDURE:  XR CHEST PA + LAT CHEST (CPT=71046)     INDICATIONS:  J18.9 Pneumonia due to infectious organism, unspecified laterality, unspecified part of lung     COMPARISON:  [facility], XR, XR CHEST PA + LAT CHEST (CPT=71046), 3/25/2024, 2:57 PM.     TECHNIQUE:  PA and lateral chest radiographs were obtained.     PATIENT STATED HISTORY: (As  transcribed by Technologist)  Patient still has a slight productive cough was treated for pneumonia.         FINDINGS:    LUNGS:  Consolidations in both lungs are mildly decreased.  CARDIAC:  Normal size cardiac silhouette.  MEDIASTINUM:  Median sternotomy wires are stable.  PLEURA:  Normal.  No pleural effusions.  BONES:  Normal for age.                   Impression: CONCLUSION:  Scattered consolidations in both lungs are mildly decreased.        LOCATION:  [facility]        Dictated by (CST): Doyle Mckay MD on 4/11/2024 at 1:13 PM       Finalized by (CST): Doyle Mckay MD on 4/11/2024 at 1:13 PM          Informed consent  Informed Consent:   The planned procedure(s), the explanation of the procedure, indications, its expected benefits, the potential complications and risks and possible alternatives and their benefits and risks were discussed with the patient. The discussion of risks, not limited to but including bleeding, infection, perforation / tear in the gastrointestinal tract, adverse effects from anesthesia, and possible prolonged hospitalization, emergency surgery, risk of morbidity or mortality, and risk of missed lesion(s) were discussed with patient.     Patient understood the proposed procedure(s), and informed consented to the procedure and elected to proceed with the procedure(s) with possible intervention (such as polypectomy, biopsy, control of bleeding, etc.) and its risks, benefits and alternatives and wish to proceed with procedure(s). All questions answered in full to patient's  satisfaction in full.       Impression/Recommendations:  Carlos Howell is a 84 year old male who presents for a EGD +/- endotherapy.   Plan to proceed with EGD with possible banding with anesthesia.     ASA class per anesthesia.   Informed consent obtained as detailed above.       DANIEL SCHUSTER M.D.  [facility]                 no

## 2024-05-01 NOTE — ANESTHESIA POSTPROCEDURE EVALUATION
[facility]    Carlos Howell Patient Status:  Hospital Outpatient Surgery   Age/Gender 84 year old male MRN [id number]   Location [facility] Attending Lion Aguilera MD   Hosp Day # 0 PCP Simba Nunez MD       Anesthesia Post-op Note    ESOPHAGOGASTRODUODENOSCOPY (EGD)    Procedure Summary       Date: 05/01/24 Room / Location:  ENDOSCOPY 04 /  ENDOSCOPY    Anesthesia Start: 1350 Anesthesia Stop:     Procedure: ESOPHAGOGASTRODUODENOSCOPY (EGD) Diagnosis:       Esophageal varices without bleeding, unspecified esophageal varices type (HCC)      (cirrhosis)    Surgeons: Lion Aguilera MD Anesthesiologist: Sammy Rivas MD    Anesthesia Type: MAC ASA Status: 3            Anesthesia Type: MAC    Vitals Value Taken Time   BP 82/74 05/01/24 1404   Temp  05/01/24 1405   Pulse 92 05/01/24 1405   Resp 18 05/01/24 1404   SpO2 94 % 05/01/24 1405   Vitals shown include unfiled device data.    Patient Location: Endoscopy    Anesthesia Type: MAC    Airway Patency: patent    Postop Pain Control: adequate    Mental Status: mildly sedated but able to meaningfully participate in the post-anesthesia evaluation    Nausea/Vomiting: none    Cardiopulmonary/Hydration status: stable euvolemic    Complications: no apparent anesthesia related complications    Postop vital signs: stable    Dental Exam: Unchanged from Preop    Patient to be discharged home when criteria met.

## 2024-05-01 NOTE — ANESTHESIA PREPROCEDURE EVALUATION
PRE-OP EVALUATION    Patient Name: Carlos Howell    Admit Diagnosis: Esophageal varices without bleeding, unspecified esophageal varices type (HCC) [I85.00]    Pre-op Diagnosis: Esophageal varices without bleeding, unspecified esophageal varices type (HCC) [I85.00]    ESOPHAGOGASTRODUODENOSCOPY (EGD)    Anesthesia Procedure: ESOPHAGOGASTRODUODENOSCOPY (EGD)    Surgeons and Role:     * Lion Aguilera MD - Primary    Pre-op vitals reviewed.  Temp: 99.7 °F (37.6 °C)  Pulse: 82  Resp: 18  BP: 113/68  SpO2: 95 %  Body mass index is 25.75 kg/m².    Current medications reviewed.  Hospital Medications:  No current facility-administered medications on file as of 5/1/2024.       Outpatient Medications:     Medications Prior to Admission   Medication Sig Dispense Refill Last Dose    pantoprazole 40 MG Oral Tab EC TAKE 1 TABLET BY MOUTH 2 TIMES A DAY BEFORE MEALS 180 tablet 0 4/30/2024    rosuvastatin 5 MG Oral Tab Take 1 tablet (5 mg total) by mouth nightly. 90 tablet 1 4/30/2024    metoprolol tartrate 25 MG Oral Tab Take 1 tablet (25 mg total) by mouth daily. 90 tablet 1 4/30/2024    hydroxychloroquine 200 MG Oral Tab Take 1 tablet (200 mg total) by mouth daily. 60 tablet 0 4/30/2024    albuterol 108 (90 Base) MCG/ACT Inhalation Aero Soln Inhale 2 puffs into the lungs every 6 (six) hours as needed for Wheezing. 1 each 0 Past Month    Cholecalciferol (VITAMIN D) 50 MCG (2000 UT) Oral Tab Take 2,000 Units by mouth daily.   4/30/2024       Allergies: Azithromycin      Anesthesia Evaluation    Patient summary reviewed.    Anesthetic Complications  (+) history of anesthetic complications         GI/Hepatic/Renal      (+) GERD          (+) liver disease                 Cardiovascular      ECG reviewed.            (+) hypertension   (+) hyperlipidemia  (+) CAD                                Endo/Other                           (+) arthritis  (+) rheumatoid arthritis     Pulmonary  Comment: ILD                          Neuro/Psych    Negative neuro/psych ROS.                                  Past Surgical History:   Procedure Laterality Date    Appendectomy      Bypass surgery      Cabg      x4 vessels    Repair ing hernia,6+y/o,reducibl       Social History     Socioeconomic History    Marital status:    Tobacco Use    Smoking status: Former     Current packs/day: 0.00     Average packs/day: 1 pack/day for 50.0 years (50.0 ttl pk-yrs)     Types: Cigarettes     Start date: 1956     Quit date: 3/26/2005     Years since quittin.1    Smokeless tobacco: Never    Tobacco comments:     quit smoking about 6 yrs ago   Vaping Use    Vaping status: Never Used   Substance and Sexual Activity    Alcohol use: Not Currently    Drug use: Never   Other Topics Concern    Caffeine Concern No    Exercise Yes    Seat Belt Yes    Special Diet No    Stress Concern No    Weight Concern No     History   Drug Use Unknown     Available pre-op labs reviewed.  Lab Results   Component Value Date    WBC 6.5 2024    RBC 4.92 2024    HGB 13.4 2024    HCT 41.8 2024    MCV 85.0 2024    MCH 27.2 2024    MCHC 32.1 2024    RDW 15.9 2024    .0 2024     Lab Results   Component Value Date     2024    K 3.8 2024     2024    CO2 22.0 2024    BUN 16 2024    CREATSERUM 0.90 2024     (H) 2024    CA 8.7 2024            Airway      Mallampati: II  Mouth opening: >3 FB  TM distance: > 6 cm  Neck ROM: full Cardiovascular    Cardiovascular exam normal.  Rhythm: regular  Rate: normal     Dental    Dentition appears grossly intact         Pulmonary    Pulmonary exam normal.  Breath sounds clear to auscultation bilaterally.               Other findings              ASA: 3   Plan: MAC  NPO status verified and patient meets guidelines.    Post-procedure pain management plan discussed with surgeon and patient.      Plan/risks discussed with:  patient                Present on Admission:  **None**

## 2024-05-01 NOTE — DISCHARGE INSTRUCTIONS
Home Care Instructions for Gastroscopy with Sedation    Diet:  - Resume your regular diet as tolerated unless otherwise instructed.  - Start with light meals to minimize bloating.  - Do not drink alcohol today.    Medication:  - If you have questions about resuming your normal medications, please contact your Primary Care Physician.    Activities:  - Take it easy today. Do not return to work today.  - Do not drive today.  - Do not operate any machinery today (including kitchen equipment).    Gastroscopy:  - You may have a sore throat for 2-3 days following the exam. This is normal. Gargling with warm salt water (1/2 tsp salt to 1 glass warm water) or using throat lozenges will help.  - If you experience any sharp pain in your neck, abdomen or chest, vomiting of blood, oral temperature over 100 degrees Fahrenheit, light-headedness or dizziness, or any other problems, contact your doctor.    **If unable to reach your doctor, please go to the [facility] Emergency Room**    - Your referring physician will receive a full report of your examination.  - If you do not hear from your doctor's office within two weeks of your biopsy, please call them for your results.    You may be able to see your laboratory results in [facility] between 4 and 7 business days.  In some cases, your physician may not have viewed the results before they are released to [facility].  If you have questions regarding your results contact the physician who ordered the test/exam by phone or via [facility] by choosing \"Ask a Medical Question.\"

## 2024-05-06 ENCOUNTER — TELEPHONE (OUTPATIENT)
Dept: FAMILY MEDICINE CLINIC | Facility: CLINIC | Age: 84
End: 2024-05-06

## 2024-05-06 NOTE — TELEPHONE ENCOUNTER
Patient has RA, he is having a lot of pain and his appt to see the rheumatoid specialist on 5/30/24, patient can not take any motrin or any nsaids, they did state he can take tylenol. Patient's daughter would like to know if it is ok for him to take Tylenol Arthritis.      Please Advise.    Thank you

## 2024-05-07 ENCOUNTER — TELEPHONE (OUTPATIENT)
Dept: FAMILY MEDICINE CLINIC | Facility: CLINIC | Age: 84
End: 2024-05-07

## 2024-05-07 NOTE — TELEPHONE ENCOUNTER
Okay to take tylenol extra strength however I would be very cautious given cirrhosis.     Okay to take tylenol extra strength 500mg every 4-6 hrs. Do not exceed more than 2g daily.

## 2024-05-07 NOTE — TELEPHONE ENCOUNTER
Pt's appointment for the week of 5/19 will be moved up to week of 5/12. Will be last appointment.

## 2024-05-20 ENCOUNTER — TELEPHONE (OUTPATIENT)
Dept: FAMILY MEDICINE CLINIC | Facility: CLINIC | Age: 84
End: 2024-05-20

## 2024-05-20 NOTE — TELEPHONE ENCOUNTER
They are going to discharge patient on 5/29. Patient has met all goals.  This is an FYI    Please call with any questions.

## 2024-05-28 ENCOUNTER — MED REC SCAN ONLY (OUTPATIENT)
Dept: FAMILY MEDICINE CLINIC | Facility: CLINIC | Age: 84
End: 2024-05-28

## 2024-05-28 DIAGNOSIS — M06.9 RHEUMATOID ARTHRITIS INVOLVING MULTIPLE SITES, UNSPECIFIED WHETHER RHEUMATOID FACTOR PRESENT (HCC): ICD-10-CM

## 2024-05-29 RX ORDER — HYDROXYCHLOROQUINE SULFATE 200 MG/1
200 TABLET, FILM COATED ORAL DAILY
Qty: 90 TABLET | Refills: 0 | Status: SHIPPED | OUTPATIENT
Start: 2024-05-29 | End: 2024-05-30

## 2024-05-30 ENCOUNTER — HOSPITAL ENCOUNTER (OUTPATIENT)
Dept: GENERAL RADIOLOGY | Age: 84
Discharge: HOME | End: 2024-05-30
Attending: INTERNAL MEDICINE
Payer: MEDICARE

## 2024-05-30 ENCOUNTER — LAB ENCOUNTER (OUTPATIENT)
Dept: LAB | Age: 84
End: 2024-05-30
Attending: INTERNAL MEDICINE
Payer: MEDICARE

## 2024-05-30 ENCOUNTER — OFFICE VISIT (OUTPATIENT)
Dept: RHEUMATOLOGY | Facility: CLINIC | Age: 84
End: 2024-05-30

## 2024-05-30 VITALS
RESPIRATION RATE: 16 BRPM | HEART RATE: 75 BPM | DIASTOLIC BLOOD PRESSURE: 62 MMHG | OXYGEN SATURATION: 97 % | BODY MASS INDEX: 24.41 KG/M2 | HEIGHT: 64 IN | SYSTOLIC BLOOD PRESSURE: 110 MMHG | WEIGHT: 143 LBS | TEMPERATURE: 98 F

## 2024-05-30 DIAGNOSIS — J84.9 ILD (INTERSTITIAL LUNG DISEASE) (HCC): ICD-10-CM

## 2024-05-30 DIAGNOSIS — M05.10 RHEUMATOID LUNG (HCC): ICD-10-CM

## 2024-05-30 DIAGNOSIS — M06.9 RHEUMATOID ARTHRITIS INVOLVING MULTIPLE SITES, UNSPECIFIED WHETHER RHEUMATOID FACTOR PRESENT (HCC): ICD-10-CM

## 2024-05-30 DIAGNOSIS — M06.9 RHEUMATOID ARTHRITIS INVOLVING MULTIPLE SITES, UNSPECIFIED WHETHER RHEUMATOID FACTOR PRESENT (HCC): Primary | ICD-10-CM

## 2024-05-30 DIAGNOSIS — Z51.81 THERAPEUTIC DRUG MONITORING: ICD-10-CM

## 2024-05-30 DIAGNOSIS — M81.0 AGE-RELATED OSTEOPOROSIS WITHOUT CURRENT PATHOLOGICAL FRACTURE: ICD-10-CM

## 2024-05-30 DIAGNOSIS — M80.00XA AGE-RELATED OSTEOPOROSIS WITH CURRENT PATHOLOGICAL FRACTURE, INITIAL ENCOUNTER: ICD-10-CM

## 2024-05-30 PROBLEM — M17.0 BILATERAL PRIMARY OSTEOARTHRITIS OF KNEE: Status: ACTIVE | Noted: 2017-11-30

## 2024-05-30 PROBLEM — G56.03 BILATERAL CARPAL TUNNEL SYNDROME: Status: ACTIVE | Noted: 2021-06-10

## 2024-05-30 LAB
ALBUMIN SERPL-MCNC: 2.8 G/DL (ref 3.4–5)
ALBUMIN/GLOB SERPL: 0.5 {RATIO} (ref 1–2)
ALP LIVER SERPL-CCNC: 100 U/L
ALT SERPL-CCNC: 23 U/L
ANION GAP SERPL CALC-SCNC: 6 MMOL/L (ref 0–18)
AST SERPL-CCNC: 19 U/L (ref 15–37)
BASOPHILS # BLD AUTO: 0.1 X10(3) UL (ref 0–0.2)
BASOPHILS NFR BLD AUTO: 1.4 %
BILIRUB SERPL-MCNC: 0.6 MG/DL (ref 0.1–2)
BUN BLD-MCNC: 13 MG/DL (ref 9–23)
CALCIUM BLD-MCNC: 9.3 MG/DL (ref 8.5–10.1)
CHLORIDE SERPL-SCNC: 104 MMOL/L (ref 98–112)
CO2 SERPL-SCNC: 26 MMOL/L (ref 21–32)
CREAT BLD-MCNC: 0.91 MG/DL
EGFRCR SERPLBLD CKD-EPI 2021: 84 ML/MIN/1.73M2 (ref 60–?)
EOSINOPHIL # BLD AUTO: 0.28 X10(3) UL (ref 0–0.7)
EOSINOPHIL NFR BLD AUTO: 4 %
ERYTHROCYTE [DISTWIDTH] IN BLOOD BY AUTOMATED COUNT: 15.9 %
FASTING STATUS PATIENT QL REPORTED: NO
GLOBULIN PLAS-MCNC: 5.9 G/DL (ref 2.8–4.4)
GLUCOSE BLD-MCNC: 98 MG/DL (ref 70–99)
HCT VFR BLD AUTO: 43.6 %
HGB BLD-MCNC: 13.7 G/DL
IMM GRANULOCYTES # BLD AUTO: 0.04 X10(3) UL (ref 0–1)
IMM GRANULOCYTES NFR BLD: 0.6 %
LYMPHOCYTES # BLD AUTO: 2.03 X10(3) UL (ref 1–4)
LYMPHOCYTES NFR BLD AUTO: 29.3 %
MAGNESIUM SERPL-MCNC: 2.1 MG/DL (ref 1.6–2.6)
MCH RBC QN AUTO: 26.7 PG (ref 26–34)
MCHC RBC AUTO-ENTMCNC: 31.4 G/DL (ref 31–37)
MCV RBC AUTO: 84.8 FL
MONOCYTES # BLD AUTO: 0.89 X10(3) UL (ref 0.1–1)
MONOCYTES NFR BLD AUTO: 12.8 %
NEUTROPHILS # BLD AUTO: 3.59 X10 (3) UL (ref 1.5–7.7)
NEUTROPHILS # BLD AUTO: 3.59 X10(3) UL (ref 1.5–7.7)
NEUTROPHILS NFR BLD AUTO: 51.9 %
OSMOLALITY SERPL CALC.SUM OF ELEC: 282 MOSM/KG (ref 275–295)
PHOSPHATE SERPL-MCNC: 3.3 MG/DL (ref 2.5–4.9)
PLATELET # BLD AUTO: 199 10(3)UL (ref 150–450)
POTASSIUM SERPL-SCNC: 4.2 MMOL/L (ref 3.5–5.1)
PROT SERPL-MCNC: 8.7 G/DL (ref 6.4–8.2)
PTH-INTACT SERPL-MCNC: 31.3 PG/ML (ref 18.5–88)
RBC # BLD AUTO: 5.14 X10(6)UL
SODIUM SERPL-SCNC: 136 MMOL/L (ref 136–145)
VIT D+METAB SERPL-MCNC: 51.4 NG/ML (ref 30–100)
WBC # BLD AUTO: 6.9 X10(3) UL (ref 4–11)

## 2024-05-30 PROCEDURE — 83735 ASSAY OF MAGNESIUM: CPT

## 2024-05-30 PROCEDURE — 36415 COLL VENOUS BLD VENIPUNCTURE: CPT

## 2024-05-30 PROCEDURE — 83970 ASSAY OF PARATHORMONE: CPT

## 2024-05-30 PROCEDURE — 80053 COMPREHEN METABOLIC PANEL: CPT

## 2024-05-30 PROCEDURE — 82306 VITAMIN D 25 HYDROXY: CPT

## 2024-05-30 PROCEDURE — 85025 COMPLETE CBC W/AUTO DIFF WBC: CPT

## 2024-05-30 PROCEDURE — 99205 OFFICE O/P NEW HI 60 MIN: CPT | Performed by: INTERNAL MEDICINE

## 2024-05-30 PROCEDURE — 84100 ASSAY OF PHOSPHORUS: CPT

## 2024-05-30 PROCEDURE — 72100 X-RAY EXAM L-S SPINE 2/3 VWS: CPT | Performed by: INTERNAL MEDICINE

## 2024-05-30 RX ORDER — HYDROXYCHLOROQUINE SULFATE 200 MG/1
400 TABLET, FILM COATED ORAL DAILY
Qty: 180 TABLET | Refills: 3 | Status: SHIPPED | OUTPATIENT
Start: 2024-05-30 | End: 2024-08-28

## 2024-05-30 RX ORDER — LEFLUNOMIDE 20 MG/1
TABLET ORAL
Qty: 38 TABLET | Refills: 0 | Status: SHIPPED | OUTPATIENT
Start: 2024-05-30 | End: 2024-07-13

## 2024-05-30 NOTE — PATIENT INSTRUCTIONS
Ask Dr. Huffman if cortisone injections of the knee would be ok. Dr. Medina  -can try tylenol arthritis, keep below 2000 mg daily    Prevnar 20: get another booster.   Covid booster: if needed    RSV vaccine in the fall    Get blood work now. Then will start Prolia authorization  Get DEXA, back X-ray  Get CT scan of the lungs    Get TB treatment history; ex-. Will message infectious disease.     Start leflunomide: 0.5 tablets (10 mg total) by mouth 1 (one) time each day for 14 days, THEN 1 tablet (20 mg total) 1 (one) time each day.   -monitor blood pressure daily  -will send a message to Dr. Huffman to get his approval    Get bloodwork (CMP, CBC w/ diff):   -early July   -early August   -early Sept    Increase hydroxychloroquine (plaquenil) to 2 pills daily.     Could consider IV Orencia in the future.

## 2024-05-30 NOTE — PROGRESS NOTES
Rheumatology New Patient Note  =====================================================================================================    Date of visit: 5/30/2024  ?  Chief complaint: RA  Chief Complaint   Patient presents with    New Patient     New patient with RA and osteoporosis. Rapid 3 score is an 8.3.     Referring (will send letter)  PCP  Simba Nunez MD  Fax: [phone]  Phone: [phone]    =====================================================================================================  HPI    Carlos Howell is a 84 year old male     Here with daughter and wife.  Seropositive, + CCP RA x 15 years  All joints are painful today. Difficulty lifting arms. New diagnosis of cirrhosis of the liver, decompensated with EV s/p banding. Had to stop his chronic steroid due to the esophageal varices.  GI told him to stop the prednisone given concerns of worsening varices in the setting of his decompensated hepatic failure    -Currently taking just hydroxychloroquine 200 mg daily.  -Mild dyspnea on exertion.  Prior imaging demonstrates some ILD changes in addition to emphysema changes.  Also previously worked as a , some pleural thickening noted.  -Notes that when he was younger, he was treated for active tuberculosis.  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  -Diagnosed and hospitalized for pneumonia in March 2024.  -History of CAD s/p CABG  -Previously on Prolia for osteoporosis.  Has been off Prolia for 1 year    Prior medications:  Methotrexate: stopped due to cirrhosis  Prednisone 5 mg: stopped due to cirrhosis, EV    14 point ROS negative except noted above    Medications:  Current Outpatient Medications on File Prior to Visit   Medication Sig Dispense Refill    rosuvastatin 5 MG Oral Tab Take 1 tablet (5 mg total) by mouth nightly. 90 tablet 1    albuterol 108 (90 Base) MCG/ACT Inhalation Aero Soln Inhale 2 puffs into the lungs every 6 (six) hours as needed for Wheezing. 1 each 0     Cholecalciferol (VITAMIN D) 50 MCG ( UT) Oral Tab Take 2,000 Units by mouth daily.      [DISCONTINUED] HYDROXYCHLOROQUINE 200 MG Oral Tab TAKE 1 TABLET BY MOUTH EVERY DAY 90 tablet 0    [DISCONTINUED] hydroxychloroquine 200 MG Oral Tab Take 1 tablet (200 mg total) by mouth daily. 60 tablet 0     No current facility-administered medications on file prior to visit.       Past Medical History:  Past Medical History:    Anesthesia complication    post-op hiccups    Arthritis    CAD (coronary artery disease)    s/p CABG    Cancer (HCC)    skin    Current episode of major depressive disorder without prior episode    Disorder of liver    cirrhosis    Esophageal reflux    Hearing impairment    High blood pressure    High cholesterol    History of 2019 novel coronavirus disease (COVID-19)    HTN (hypertension)    Rheumatoid arthritis (HCC)     Past Surgical History:  Past Surgical History:   Procedure Laterality Date    Appendectomy      Bypass surgery      Cabg      x4 vessels    Repair ing hernia,6+y/o,reducibl       Family History:  Family History   Problem Relation Age of Onset    Other (Other) Father      Social History:  Social History     Socioeconomic History    Marital status:    Tobacco Use    Smoking status: Former     Current packs/day: 0.00     Average packs/day: 1 pack/day for 50.0 years (50.0 ttl pk-yrs)     Types: Cigarettes     Start date: 1956     Quit date: 3/26/2005     Years since quittin.1    Smokeless tobacco: Never    Tobacco comments:     quit smoking about 6 yrs ago   Vaping Use    Vaping status: Never Used   Substance and Sexual Activity    Alcohol use: Not Currently    Drug use: Never   Other Topics Concern    Caffeine Concern No    Exercise Yes    Seat Belt Yes    Special Diet No    Stress Concern No    Weight Concern No     Social Determinants of Health     Food Insecurity: No Food Insecurity (3/25/2024)    Food Insecurity     Food Insecurity: Never true   Transportation  Needs: No Transportation Needs (3/25/2024)    Transportation Needs     Lack of Transportation: No   Housing Stability: Low Risk  (3/25/2024)    Housing Stability     Housing Instability: No     ?  Allergies:  Allergies   Allergen Reactions    Azithromycin RASH     Face flushing         Objective    Vitals:    05/30/24 1002   BP: 110/62   Pulse: 75   Resp: 16   Temp: 97.6 °F (36.4 °C)   SpO2: 97%   Weight: 143 lb (64.9 kg)   Height: 5' 4\" (1.626 m)       GEN: NAD, well-nourished.   HEENT: Head: NCAT. Face: No lesions. Eyes: Conjunctiva clear. Sclera are anicteric. PERRLA. EOMs are full. Ears: The right and left ear canals are clear.  Nose: No external or internal nasal deformities. Nasal septum is midline. Mouth: The lips are within normal limits.  No oral ulcers Tongue is midline with no lesions. The oral cavity is clear.   Neck: Supple. No neck masses. No thyromegaly. No LAD, parotid or submandicular gland palpated.   PULM: Bibasilar crackles  Extremities: No cyanosis, edema or deformities.   Neurologic: Strength, CN2-12 grossly intact   Psych: normal affect.   Skin: No lesions or rashes.  MSK: 28 joint count performed. No evidence of synovitis in mcp, pip, dip, wrist, elbows, shoulders, hips, knees, ankles, mtp unless otherwise noted. Full ROM of elbows, wrists, knees.     PtGA: 9  SJ: 5 (shoulders, right knee, bilateral wrists)  TJ: 7 (MCPs, shoulders, right knee, bilateral wrists)  MDGA: 5    Right knee with synovitis  -Significant osteochondral hypertrophy of the bilateral wrists, fingers.    CDAI: 26  ?  Labs:      10/2022  IGRA positive    6/2021  CCP 32.8 elevated  DREAD 1:320 homogenous    CT LD 2021:  LUNGS:  upper lobe subpleural blebs consistent with emphysematous changes.   Bilateral pleural parenchymal scarring.  Diffuse peribronchial thickening.  Subpleural interstitial densities and   nonspecific ground-glass density associated with pleural thickening and nodularity particularly involves the lower  lobes with honeycombing, right greater than left.       Lab Results   Component Value Date    WBC 6.9 05/30/2024    RBC 5.14 05/30/2024    HGB 13.7 05/30/2024    HCT 43.6 05/30/2024    .0 05/30/2024    MCV 84.8 05/30/2024    MCH 26.7 05/30/2024    MCHC 31.4 05/30/2024    RDW 15.9 05/30/2024    NEPRELIM 3.59 05/30/2024    NEUTABS 8.21 (H) 04/03/2023    LYMPHABS 1.55 04/03/2023    EOSABS 0.00 04/03/2023    BASABS 0.00 04/03/2023    NEUT 74 04/03/2023    LYMPH 14 04/03/2023    MON 9 04/03/2023    EOS 0 04/03/2023    BASO 0 04/03/2023    NEPERCENT 51.9 05/30/2024    LYPERCENT 29.3 05/30/2024    MOPERCENT 12.8 05/30/2024    EOPERCENT 4.0 05/30/2024    BAPERCENT 1.4 05/30/2024    NE 3.59 05/30/2024    LYMABS 2.03 05/30/2024    MOABSO 0.89 05/30/2024    EOABSO 0.28 05/30/2024    BAABSO 0.10 05/30/2024     Lab Results   Component Value Date     (H) 03/28/2024    BUN 16 03/28/2024    BUNCREA 14.5 02/13/2021    CREATSERUM 0.90 03/28/2024    ANIONGAP 10 03/28/2024    GFR >59 04/01/2009    GFRNAA 85 05/21/2022    GFRAA 98 05/21/2022    CA 8.7 03/28/2024    OSMOCALC 292 03/28/2024    ALKPHO 89 03/28/2024    AST 34 03/28/2024    ALT 27 03/28/2024    BILT 0.6 03/28/2024    TP 7.1 03/28/2024    ALB 2.4 (L) 03/28/2024    GLOBULIN 4.7 (H) 03/28/2024     03/28/2024    K 3.8 03/28/2024     03/28/2024    CO2 22.0 03/28/2024         No results found for: \"ANATI\", \"DREAD\", \"ANAS\", \"ANASCRN\", \"ANASCRNRFLX\", \"ROCHELLE\"  No results found for: \"SSA\", \"SSAUR\", \"ANTISSA\", \"SSA52\", \"SSA60\", \"SSADD\", \"SSB\", \"ANTISSB\"  No results found for: \"DSDNA\", \"ANTIDSDNA\", \"SMUD\", \"ANTISM\", \"SM\", \"RNP\", \"ANTIRNP\", \"SMITHRNP\"  No results found for: \"SCL70\", \"SCL\", \"BMQCXKB01\"  No results found for: \"C3\", \"C4\"  No results found for: \"DRVVT\", \"LAINT\", \"PTTLUPUS\", \"LUPUSINTERP\", \"LA\", \"X8JA5BLKMV\", \"P6KD9DJMDX\", \"T4MIKAVECP\", \"Z5YCEFVWTP\"  No results found for: \"CARDIOLIPIGG\", \"CARDIOLIPIGM\", \"CARDIOLIPIGA\", \"CARDIOIGA\",  \"CARLIP\"      Additional Labs:    Radiology:  XR LUMBAR SPINE (MIN 2 VIEWS) (CPT=72100)    Result Date: 5/30/2024  CONCLUSION:  1. Mild L1 and L5 superior endplate depression suggests mild compression deformities of indeterminate age.  An MRI of the lumbar spine can be obtained for further evaluation as clinically indicated.    LOCATION:  [facility]   Dictated by (CST): Thalia Louis MD on 5/30/2024 at 12:33 PM     Finalized by (CST): Thalia Louis MD on 5/30/2024 at 12:35 PM       XR CHEST PA + LAT CHEST (CPT=71046)    Result Date: 4/11/2024  CONCLUSION:  Scattered consolidations in both lungs are mildly decreased.   LOCATION:  [facility]   Dictated by (CST): Doyle Mckay MD on 4/11/2024 at 1:13 PM     Finalized by (CST): Doyle Mckay MD on 4/11/2024 at 1:13 PM       US ABDOMEN COMPLETE (CPT=76700)    Result Date: 3/28/2024  CONCLUSION:  1. Limited examination due to body habitus and bowel gas.  Bowel gas obscuration of the pancreas. 2. Cirrhotic morphology of the liver. 3. Likely prior cholecystectomy.  Caliber of the bile ducts may be normal allowing for prior gallbladder removal.  Correlation with LFTs recommended. 4. Upper normal to borderline abdominal aortic aneurysm. 5. Details as above.  Continued clinical correlation recommended.     LOCATION:  [facility]    Dictated by (CST): Gilberto Fleming MD on 3/28/2024 at 10:19 AM     Finalized by (CST): Gilberto Fleming MD on 3/28/2024 at 10:25 AM       XR CHEST PA + LAT CHEST (CPT=71046)    Result Date: 3/25/2024  CONCLUSION:  See above.   LOCATION:  [id number]   Dictated by (CST): Vladislav Griffin MD on 3/25/2024 at 3:16 PM     Finalized by (CST): Vladislav Griffin MD on 3/25/2024 at 3:17 PM        Radiology review:  XR LUMBAR SPINE (MIN 2 VIEWS) (CPT=72100)    Result Date: 5/30/2024  CONCLUSION:  1. Mild L1 and L5 superior endplate depression suggests mild compression deformities of indeterminate age.  An MRI of the lumbar spine can be obtained for further evaluation as clinically indicated.     LOCATION:  [place]   Dictated by (CST): Thalia Louis MD on 5/30/2024 at 12:33 PM     Finalized by (CST): Thalia Louis MD on 5/30/2024 at 12:35 PM      =====================================================================================================  Assessment and Plan    Assessment:  1. Rheumatoid arthritis involving multiple sites, unspecified whether rheumatoid factor present (HCC)    2. 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, initial encounter    3. Rheumatoid lung (HCC)    4. Therapeutic drug monitoring    5. ILD (interstitial lung disease) ([facility])      #Seropositive (+ CCP) rheumatoid arthritis: Diagnosed in the late 2000's.  -Prior medications: Methotrexate (stopped due to cirrhosis), prednisone (stopped due to decompensated cirrhosis with esophageal varices)  -CDAI 26 today indicating severe disease activity. Untreated RA at this activity level long-term will result in irreversible articular damage, joint deformity, and disability. Untreated RA is associated with increased: risk of infection, risk of lymphoma,  risk of major adverse cardiovascular events (MACE), and risk of mortality.     #RA-ILD: Fibrotic changes seen in the past on low-dose CT scan from 2021    #Cirrhosis: Decompensated with esophageal varices    #Osteoporosis: Previously on Prolia, has been off Prolia for a year or so.  #Low back pain  -Postvisit addendum: Lumbar spine demonstrating L1/L5 compression fractures, likely the cause of the patient's pain    ?  Plan:  Ask Dr. Huffman if cortisone injections of the right knee would be ok. Dr. Medina is the patient's Ortho  -can try tylenol arthritis, keep below 2000 mg daily    Prevnar 20: get another booster.  Given pneumonia in 3/2024.  Last pneumonia vaccine series was in 2019.  Needs a booster.  Covid booster: if needed    RSV vaccine in the fall    Get blood work now.  Add on ILD labs including ANCA, myositis panel.  Then will start Prolia authorization.  Needs to get  Prolia now.  Suspect lumbar compression fractures was because the patient has been off Prolia for a year.  Rapid deterioration of vertebral bodies and subsequent compression fractures have resulted    Get DEXA to reassess osteoporosis, get stat lumbar spine x-ray to evaluate for compression fracture given back pain  Get CT scan of the lungs, get HRCT which has not been done to evaluate for extent of RA-ILD    Get TB treatment history; ex-. Will message infectious disease.  I suspect his QuantiFERON gold will be positive for life given patient reports active TB treatment many decades ago.    Start leflunomide: 0.5 tablets (10 mg total) by mouth 1 (one) time each day for 14 days, THEN 1 tablet (20 mg total) 1 (one) time each day.   -monitor blood pressure daily  -will send a message to Dr. Huffman to get his approval before starting leflunomide.  I read over hepatology's note, avoid methotrexate but other medications were okay    Increase hydroxychloroquine (plaquenil) to 2 pills daily.     Could consider IV Orencia in the future.       Get bloodwork (CMP, CBC w/ diff):   -early July   -early August   -early Sept        Diagnoses and all orders for this visit:    Rheumatoid arthritis involving multiple sites, unspecified whether rheumatoid factor present (HCC)  -     hydroxychloroquine 200 MG Oral Tab; Take 2 tablets (400 mg total) by mouth daily.  -     PTH, Intact; Future  -     Phosphorus; Future  -     Magnesium; Future  -     Vitamin D; Future  -     CT CHEST HI RESOLUTION (CPT=71250); Future  -     leflunomide 20 MG Oral Tab; Take 0.5 tablets (10 mg total) by mouth daily for 14 days, THEN 1 tablet (20 mg total) daily.  -     Comp Metabolic Panel (14); Standing  -     CBC With Differential With Platelet; Standing  -     Comp Metabolic Panel (14); Future  -     CBC With Differential With Platelet; Future  -     Myositis Antibody Comprehensive Panel; Future  -     ANCA Panel Vasculitis; Future    Age-related  osteoporosis with current pathological fracture, initial encounter  -     hydroxychloroquine 200 MG Oral Tab; Take 2 tablets (400 mg total) by mouth daily.  -     PTH, Intact; Future  -     Phosphorus; Future  -     Magnesium; Future  -     Vitamin D; Future  -     CT CHEST HI RESOLUTION (CPT=71250); Future  -     leflunomide 20 MG Oral Tab; Take 0.5 tablets (10 mg total) by mouth daily for 14 days, THEN 1 tablet (20 mg total) daily.  -     Comp Metabolic Panel (14); Standing  -     CBC With Differential With Platelet; Standing  -     Cancel: XR LUMBAR SPINE (MIN 2 VIEWS) (CPT=72100); Future  -     XR DEXA BONE DENSITOMETRY (CPT=77080); Future  -     XR LUMBAR SPINE (MIN 2 VIEWS) (CPT=72100); Future    Rheumatoid lung (HCC)  -     hydroxychloroquine 200 MG Oral Tab; Take 2 tablets (400 mg total) by mouth daily.  -     PTH, Intact; Future  -     Phosphorus; Future  -     Magnesium; Future  -     Vitamin D; Future  -     CT CHEST HI RESOLUTION (CPT=71250); Future  -     leflunomide 20 MG Oral Tab; Take 0.5 tablets (10 mg total) by mouth daily for 14 days, THEN 1 tablet (20 mg total) daily.  -     Comp Metabolic Panel (14); Standing  -     CBC With Differential With Platelet; Standing    Therapeutic drug monitoring    ILD (interstitial lung disease) (HCC)  -     Comp Metabolic Panel (14); Future  -     CBC With Differential With Platelet; Future  -     Myositis Antibody Comprehensive Panel; Future  -     ANCA Panel Vasculitis; Future        Return in about 13 weeks (around 8/29/2024).      The above plan of care, diagnosis, orders, and follow-up were discussed with the patient. Questions related to this recommended plan of care were answered.    Thank you for referring this delightful patient to me. Please feel free to contact me with any questions.     This report was performed utilizing speech recognition software technology. Despite proofreading, speech recognition errors could escape detection. If a word or phrase  is confusing or out of context, please do not hesitate to call for   clarification.       Kind regards      Heidi Valles MD  [facility]

## 2024-06-03 ENCOUNTER — TELEPHONE (OUTPATIENT)
Dept: RHEUMATOLOGY | Facility: CLINIC | Age: 84
End: 2024-06-03

## 2024-06-03 NOTE — TELEPHONE ENCOUNTER
Spoke to pt daughter, Notified of test results  \"compression fractures noted in lumbar spine (L1, L5), likely due to being off Prolia.  Being off Prolia can lead to rapid deterioration of lumbar spine bone density.  Compression fractures are likely the cause of the pain.  Need to get back on Prolia immediately.  Blood work is okay\" Voices understanding, order for Proliaat [facility] placed on Dr. Lucia paul for approval

## 2024-06-03 NOTE — TELEPHONE ENCOUNTER
Please call patient, compression fractures noted in lumbar spine (L1, L5), likely due to being off Prolia.  Being off Prolia can lead to rapid deterioration of lumbar spine bone density.  Compression fractures are likely the cause of the pain.  Need to get back on Prolia immediately.  Blood work is okay.    Start stat authorization for Prolia.  Get this done at [facility]

## 2024-06-04 ENCOUNTER — TELEPHONE (OUTPATIENT)
Dept: RHEUMATOLOGY | Facility: CLINIC | Age: 84
End: 2024-06-04

## 2024-06-04 NOTE — TELEPHONE ENCOUNTER
Heidi Valles MD  [facility] Triage  Need order for Prolia to [facility]. STAT, new compression fractures.  ___________________________________________________________________    Patient with Medicare B and supplement. No PA is required. Order faxed yesterday. See TE.

## 2024-06-07 ENCOUNTER — OFFICE VISIT (OUTPATIENT)
Dept: HEMATOLOGY/ONCOLOGY | Age: 84
End: 2024-06-07
Attending: INTERNAL MEDICINE
Payer: MEDICARE

## 2024-06-07 DIAGNOSIS — M81.0 SENILE OSTEOPOROSIS: Primary | ICD-10-CM

## 2024-06-07 PROCEDURE — 96372 THER/PROPH/DIAG INJ SC/IM: CPT

## 2024-06-07 NOTE — PROGRESS NOTES
Reviewed side effects and pt instructions with patient and dtr.  Next appt for inj scheduled. Aware to have labs done 1-2 weeks prior. Labs released from therapy plan.  Tolerated injection and departed stable.

## 2024-06-09 ENCOUNTER — TELEPHONE (OUTPATIENT)
Dept: RHEUMATOLOGY | Facility: CLINIC | Age: 84
End: 2024-06-09

## 2024-06-10 ENCOUNTER — MED REC SCAN ONLY (OUTPATIENT)
Dept: FAMILY MEDICINE CLINIC | Facility: CLINIC | Age: 84
End: 2024-06-10

## 2024-06-10 NOTE — TELEPHONE ENCOUNTER
Called pt daughter, explained that Dr. Valles got the ok for patient to take leflunomide from Dr. Huffman. Both are recommending repeating blood work monthly (comprehensive metabolic panel (CMP) and complete blood count with differential (CBC w/ diff)) for the first 3 months. Then every 3 months thereafter.     Orders in the system. Reminded to schedule DEXA and CT for the chest.   Vocied understanding and would like Dr. Valles to know that patient received his Prolia on Friday.

## 2024-06-10 NOTE — TELEPHONE ENCOUNTER
Call pt's daughter Jodie.Got the ok for leflunomide from Dr. Huffman. We both recommend repeating blood work monthly (comprehensive metabolic panel (CMP) and complete blood count with differential (CBC w/ diff)) for the first 3 months. Then every 3 months thereafter.    Orders in the system. Reminded to schedule DEXA and CT for the chest.

## 2024-07-22 ENCOUNTER — HOSPITAL ENCOUNTER (OUTPATIENT)
Dept: BONE DENSITY | Age: 84
Discharge: HOME | End: 2024-07-22
Attending: INTERNAL MEDICINE
Payer: MEDICARE

## 2024-07-22 ENCOUNTER — LAB ENCOUNTER (OUTPATIENT)
Dept: LAB | Age: 84
End: 2024-07-22
Attending: INTERNAL MEDICINE
Payer: MEDICARE

## 2024-07-22 ENCOUNTER — TELEPHONE (OUTPATIENT)
Dept: RHEUMATOLOGY | Facility: CLINIC | Age: 84
End: 2024-07-22

## 2024-07-22 DIAGNOSIS — M81.0 SENILE OSTEOPOROSIS: ICD-10-CM

## 2024-07-22 DIAGNOSIS — M80.00XA AGE-RELATED OSTEOPOROSIS WITH CURRENT PATHOLOGICAL FRACTURE, INITIAL ENCOUNTER: ICD-10-CM

## 2024-07-22 DIAGNOSIS — M06.9 RHEUMATOID ARTHRITIS INVOLVING MULTIPLE SITES, UNSPECIFIED WHETHER RHEUMATOID FACTOR PRESENT (HCC): ICD-10-CM

## 2024-07-22 DIAGNOSIS — J84.9 ILD (INTERSTITIAL LUNG DISEASE) (HCC): ICD-10-CM

## 2024-07-22 LAB
BASOPHILS # BLD AUTO: 0.09 X10(3) UL (ref 0–0.2)
BASOPHILS NFR BLD AUTO: 1.4 %
EOSINOPHIL # BLD AUTO: 0.15 X10(3) UL (ref 0–0.7)
EOSINOPHIL NFR BLD AUTO: 2.4 %
ERYTHROCYTE [DISTWIDTH] IN BLOOD BY AUTOMATED COUNT: 16.2 %
HCT VFR BLD AUTO: 47.1 %
HGB BLD-MCNC: 14.7 G/DL
IMM GRANULOCYTES # BLD AUTO: 0.04 X10(3) UL (ref 0–1)
IMM GRANULOCYTES NFR BLD: 0.6 %
LYMPHOCYTES # BLD AUTO: 1.85 X10(3) UL (ref 1–4)
LYMPHOCYTES NFR BLD AUTO: 29.4 %
MCH RBC QN AUTO: 26.6 PG (ref 26–34)
MCHC RBC AUTO-ENTMCNC: 31.2 G/DL (ref 31–37)
MCV RBC AUTO: 85.3 FL
MONOCYTES # BLD AUTO: 0.79 X10(3) UL (ref 0.1–1)
MONOCYTES NFR BLD AUTO: 12.6 %
NEUTROPHILS # BLD AUTO: 3.37 X10 (3) UL (ref 1.5–7.7)
NEUTROPHILS # BLD AUTO: 3.37 X10(3) UL (ref 1.5–7.7)
NEUTROPHILS NFR BLD AUTO: 53.6 %
PLATELET # BLD AUTO: 164 10(3)UL (ref 150–450)
RBC # BLD AUTO: 5.52 X10(6)UL
WBC # BLD AUTO: 6.3 X10(3) UL (ref 4–11)

## 2024-07-22 PROCEDURE — 77080 DXA BONE DENSITY AXIAL: CPT | Performed by: INTERNAL MEDICINE

## 2024-07-22 PROCEDURE — 86037 ANCA TITER EACH ANTIBODY: CPT

## 2024-07-22 PROCEDURE — 86235 NUCLEAR ANTIGEN ANTIBODY: CPT

## 2024-07-22 PROCEDURE — 36415 COLL VENOUS BLD VENIPUNCTURE: CPT

## 2024-07-22 PROCEDURE — 83516 IMMUNOASSAY NONANTIBODY: CPT

## 2024-07-22 PROCEDURE — 80053 COMPREHEN METABOLIC PANEL: CPT

## 2024-07-22 PROCEDURE — 85025 COMPLETE CBC W/AUTO DIFF WBC: CPT

## 2024-07-22 PROCEDURE — 83735 ASSAY OF MAGNESIUM: CPT

## 2024-07-22 NOTE — TELEPHONE ENCOUNTER
What dose of leflunomide is he taking? If taking full pill, recommend decreasing to 1/2 pill daily until next rtc.

## 2024-07-23 ENCOUNTER — TELEPHONE (OUTPATIENT)
Dept: RHEUMATOLOGY | Facility: CLINIC | Age: 84
End: 2024-07-23

## 2024-07-23 ENCOUNTER — HOSPITAL ENCOUNTER (OUTPATIENT)
Dept: CT IMAGING | Age: 84
Discharge: HOME | End: 2024-07-23
Attending: INTERNAL MEDICINE
Payer: MEDICARE

## 2024-07-23 DIAGNOSIS — M80.00XA AGE-RELATED OSTEOPOROSIS WITH CURRENT PATHOLOGICAL FRACTURE, INITIAL ENCOUNTER: ICD-10-CM

## 2024-07-23 DIAGNOSIS — M05.10 RHEUMATOID LUNG (HCC): ICD-10-CM

## 2024-07-23 DIAGNOSIS — R76.12 POSITIVE QUANTIFERON-TB GOLD TEST: Primary | ICD-10-CM

## 2024-07-23 DIAGNOSIS — M06.9 RHEUMATOID ARTHRITIS INVOLVING MULTIPLE SITES, UNSPECIFIED WHETHER RHEUMATOID FACTOR PRESENT (HCC): ICD-10-CM

## 2024-07-23 LAB
ALBUMIN SERPL-MCNC: 3.6 G/DL (ref 3.2–4.8)
ALBUMIN/GLOB SERPL: 0.8 {RATIO} (ref 1–2)
ALP LIVER SERPL-CCNC: 110 U/L
ALT SERPL-CCNC: 22 U/L
ANION GAP SERPL CALC-SCNC: 5 MMOL/L (ref 0–18)
AST SERPL-CCNC: 42 U/L (ref ?–34)
BILIRUB SERPL-MCNC: 0.5 MG/DL (ref 0.2–1.1)
BUN BLD-MCNC: 11 MG/DL (ref 9–23)
CALCIUM BLD-MCNC: 8.4 MG/DL (ref 8.7–10.4)
CHLORIDE SERPL-SCNC: 105 MMOL/L (ref 98–112)
CO2 SERPL-SCNC: 26 MMOL/L (ref 21–32)
CREAT BLD-MCNC: 0.97 MG/DL
EGFRCR SERPLBLD CKD-EPI 2021: 77 ML/MIN/1.73M2 (ref 60–?)
FASTING STATUS PATIENT QL REPORTED: NO
GLOBULIN PLAS-MCNC: 4.8 G/DL (ref 2.8–4.4)
GLUCOSE BLD-MCNC: 93 MG/DL (ref 70–99)
MAGNESIUM SERPL-MCNC: 1.8 MG/DL (ref 1.6–2.6)
OSMOLALITY SERPL CALC.SUM OF ELEC: 281 MOSM/KG (ref 275–295)
POTASSIUM SERPL-SCNC: 4.3 MMOL/L (ref 3.5–5.1)
PROT SERPL-MCNC: 8.4 G/DL (ref 5.7–8.2)
SODIUM SERPL-SCNC: 136 MMOL/L (ref 136–145)

## 2024-07-23 PROCEDURE — 71250 CT THORAX DX C-: CPT | Performed by: INTERNAL MEDICINE

## 2024-07-24 ENCOUNTER — HOSPITAL ENCOUNTER (EMERGENCY)
Age: 84
Discharge: HOME | End: 2024-07-24
Attending: EMERGENCY MEDICINE
Payer: MEDICARE

## 2024-07-24 ENCOUNTER — APPOINTMENT (OUTPATIENT)
Dept: GENERAL RADIOLOGY | Age: 84
End: 2024-07-24
Attending: EMERGENCY MEDICINE
Payer: MEDICARE

## 2024-07-24 VITALS
BODY MASS INDEX: 29.45 KG/M2 | SYSTOLIC BLOOD PRESSURE: 122 MMHG | DIASTOLIC BLOOD PRESSURE: 77 MMHG | OXYGEN SATURATION: 97 % | RESPIRATION RATE: 18 BRPM | WEIGHT: 150 LBS | HEART RATE: 74 BPM | HEIGHT: 60 IN | TEMPERATURE: 98 F

## 2024-07-24 DIAGNOSIS — J84.10 FIBROTIC LUNG DISEASES (HCC): Primary | ICD-10-CM

## 2024-07-24 LAB
ALBUMIN SERPL-MCNC: 2.6 G/DL (ref 3.4–5)
ALBUMIN/GLOB SERPL: 0.5 {RATIO} (ref 1–2)
ALP LIVER SERPL-CCNC: 134 U/L
ALT SERPL-CCNC: 29 U/L
ANION GAP SERPL CALC-SCNC: 4 MMOL/L (ref 0–18)
ANTI-MPO ANTIBODIES: <0.2 UNITS
ANTI-PR3 ANTIBODIES: <0.2 UNITS
AST SERPL-CCNC: 42 U/L (ref 15–37)
BASOPHILS # BLD AUTO: 0.06 X10(3) UL (ref 0–0.2)
BASOPHILS NFR BLD AUTO: 1.2 %
BILIRUB SERPL-MCNC: 0.3 MG/DL (ref 0.1–2)
BUN BLD-MCNC: 20 MG/DL (ref 9–23)
CALCIUM BLD-MCNC: 9 MG/DL (ref 8.5–10.1)
CHLORIDE SERPL-SCNC: 107 MMOL/L (ref 98–112)
CO2 SERPL-SCNC: 26 MMOL/L (ref 21–32)
CREAT BLD-MCNC: 0.79 MG/DL
EGFRCR SERPLBLD CKD-EPI 2021: 88 ML/MIN/1.73M2 (ref 60–?)
EOSINOPHIL # BLD AUTO: 0.18 X10(3) UL (ref 0–0.7)
EOSINOPHIL NFR BLD AUTO: 3.7 %
ERYTHROCYTE [DISTWIDTH] IN BLOOD BY AUTOMATED COUNT: 16.1 %
GLOBULIN PLAS-MCNC: 5.3 G/DL (ref 2.8–4.4)
GLUCOSE BLD-MCNC: 106 MG/DL (ref 70–99)
HCT VFR BLD AUTO: 42.7 %
HGB BLD-MCNC: 13.6 G/DL
IMM GRANULOCYTES # BLD AUTO: 0.02 X10(3) UL (ref 0–1)
IMM GRANULOCYTES NFR BLD: 0.4 %
LACTATE SERPL-SCNC: 2 MMOL/L (ref 0.4–2)
LYMPHOCYTES # BLD AUTO: 1.49 X10(3) UL (ref 1–4)
LYMPHOCYTES NFR BLD AUTO: 31 %
MCH RBC QN AUTO: 27.1 PG (ref 26–34)
MCHC RBC AUTO-ENTMCNC: 31.9 G/DL (ref 31–37)
MCV RBC AUTO: 85.1 FL
MONOCYTES # BLD AUTO: 0.68 X10(3) UL (ref 0.1–1)
MONOCYTES NFR BLD AUTO: 14.1 %
NEUTROPHILS # BLD AUTO: 2.38 X10 (3) UL (ref 1.5–7.7)
NEUTROPHILS # BLD AUTO: 2.38 X10(3) UL (ref 1.5–7.7)
NEUTROPHILS NFR BLD AUTO: 49.6 %
OSMOLALITY SERPL CALC.SUM OF ELEC: 287 MOSM/KG (ref 275–295)
PLATELET # BLD AUTO: 131 10(3)UL (ref 150–450)
POTASSIUM SERPL-SCNC: 4.3 MMOL/L (ref 3.5–5.1)
PROT SERPL-MCNC: 7.9 G/DL (ref 6.4–8.2)
RBC # BLD AUTO: 5.02 X10(6)UL
SARS-COV-2 RNA RESP QL NAA+PROBE: NOT DETECTED
SODIUM SERPL-SCNC: 137 MMOL/L (ref 136–145)
WBC # BLD AUTO: 4.8 X10(3) UL (ref 4–11)

## 2024-07-24 PROCEDURE — 87040 BLOOD CULTURE FOR BACTERIA: CPT | Performed by: EMERGENCY MEDICINE

## 2024-07-24 PROCEDURE — 80053 COMPREHEN METABOLIC PANEL: CPT | Performed by: EMERGENCY MEDICINE

## 2024-07-24 PROCEDURE — 83605 ASSAY OF LACTIC ACID: CPT | Performed by: EMERGENCY MEDICINE

## 2024-07-24 PROCEDURE — 71045 X-RAY EXAM CHEST 1 VIEW: CPT | Performed by: EMERGENCY MEDICINE

## 2024-07-24 PROCEDURE — 85025 COMPLETE CBC W/AUTO DIFF WBC: CPT | Performed by: EMERGENCY MEDICINE

## 2024-07-24 PROCEDURE — 99284 EMERGENCY DEPT VISIT MOD MDM: CPT

## 2024-07-24 PROCEDURE — 93010 ELECTROCARDIOGRAM REPORT: CPT

## 2024-07-24 PROCEDURE — 36415 COLL VENOUS BLD VENIPUNCTURE: CPT

## 2024-07-24 PROCEDURE — 93005 ELECTROCARDIOGRAM TRACING: CPT

## 2024-07-24 PROCEDURE — 84145 PROCALCITONIN (PCT): CPT | Performed by: EMERGENCY MEDICINE

## 2024-07-24 PROCEDURE — 99285 EMERGENCY DEPT VISIT HI MDM: CPT

## 2024-07-24 NOTE — TELEPHONE ENCOUNTER
Call pt's daughter.  -is there any way to get records from patient's previously treated tuberculosis? Does patient remember what medications he was on and how many months he was on this for?  -Would check in with pulm. Referral in place. To evaluate RA-ILD. Need to tailor treatment to help both the RA and RA lung inflammation.

## 2024-07-24 NOTE — TELEPHONE ENCOUNTER
Spoke with patients daughter anette and provided with information below and from the CT scan.   She is unsure he ever received treatment for TB. States he had this when he was a child. She will ask him.   Provided referral information.    Anette is asking if there is anything we can cox to help his pain. States he is having a lot of pain even with the hydroxychloroquine. Asking if there are any options to help short term even while they establish with a pulmonologist and discuss other treatment options.     Please advise.

## 2024-07-24 NOTE — TELEPHONE ENCOUNTER
Called and spoke with daughter Mona. Pt followed medication directions as prescribed, however, does not want to chance anything with pt as stomach issues were bad. Is looking for some type of alternative medication and would like to stay away from prednisone. Is wondering if needing to add on an injectable.     Dr Valles-- see above

## 2024-07-24 NOTE — ED PROVIDER NOTES
Patient Seen in: [facility] In [place]      History     Chief Complaint   Patient presents with    Cough/URI    Difficulty Breathing     Stated Complaint: cough, gagging annd shortness of breatn 1 week    Subjective:   HPI    84-year-old male with history of chronic fibrotic lung disease not on home oxygen presents for evaluation of cough and shortness of breath for the past 1 week.  Patient also has had some low energy during this time.  No reported fever.  No sore throat or nasal congestion.  1 family member has COVID.  Denies chest pain.  No swelling of his legs.  History of recurrent pneumonias for which he was last admitted a few months ago.  Had an outpatient CT of the chest done yesterday as ordered by his rheumatologist.  Family was told they would need to schedule outpatient PFTs and pulmonary consult    Objective:   Past Medical History:    Anesthesia complication    post-op hiccups    Arthritis    CAD (coronary artery disease)    s/p CABG    Cancer (HCC)    skin    Current episode of major depressive disorder without prior episode    Disorder of liver    cirrhosis    Esophageal reflux    Hearing impairment    High blood pressure    High cholesterol    History of 2019 novel coronavirus disease (COVID-19)    HTN (hypertension)    Rheumatoid arthritis (HCC)              Past Surgical History:   Procedure Laterality Date    Appendectomy      Bypass surgery      Cabg      x4 vessels    Repair ing hernia,5+y/o,reducibl                  Social History     Socioeconomic History    Marital status:    Tobacco Use    Smoking status: Former     Current packs/day: 0.00     Average packs/day: 1 pack/day for 50.0 years (50.0 ttl pk-yrs)     Types: Cigarettes     Start date: 1956     Quit date: 3/26/2005     Years since quittin.3    Smokeless tobacco: Never    Tobacco comments:     quit smoking about 6 yrs ago   Vaping Use    Vaping status: Never Used   Substance and Sexual Activity     Alcohol use: Not Currently    Drug use: Never   Other Topics Concern    Caffeine Concern No    Exercise Yes    Seat Belt Yes    Special Diet No    Stress Concern No    Weight Concern No     Social Determinants of Health     Food Insecurity: No Food Insecurity (3/25/2024)    Food Insecurity     Food Insecurity: Never true   Transportation Needs: No Transportation Needs (3/25/2024)    Transportation Needs     Lack of Transportation: No   Housing Stability: Low Risk  (3/25/2024)    Housing Stability     Housing Instability: No              Review of Systems    Positive for stated Chief Complaint: Cough/URI and Difficulty Breathing    Other systems are as noted in HPI.  Constitutional and vital signs reviewed.      All other systems reviewed and negative except as noted above.    Physical Exam     ED Triage Vitals [07/24/24 1848]   /65   Pulse 72   Resp 17   Temp 98.3 °F (36.8 °C)   Temp src Oral   SpO2 96 %   O2 Device None (Room air)       Current Vitals:   Vital Signs  BP: 105/65  Pulse: 72  Resp: 17  Temp: 98.3 °F (36.8 °C)  Temp src: Oral    Oxygen Therapy  SpO2: 96 %  O2 Device: None (Room air)            Physical Exam     General: Alert, oriented, no apparent distress  HEENT: Atraumatic, normocephalic.  Pupils equal reactive.  Extraocular motions intact.   Neck: Supple  Lungs: Clear to auscultation bilaterally.  Heart: Few bilateral fine crackles, no wheezes  Abdomen: Soft, nontender.   Skin: No rash.  No edema.  Neurologic: No focal neurologic deficits.    Musculoskeletal: No tenderness or deformity noted.       ED Course     Labs Reviewed   COMP METABOLIC PANEL (14) - Abnormal; Notable for the following components:       Result Value    Glucose 106 (*)     AST 42 (*)     Alkaline Phosphatase 134 (*)     Albumin 2.6 (*)     Globulin  5.3 (*)     A/G Ratio 0.5 (*)     All other components within normal limits   CBC W/ DIFFERENTIAL - Abnormal; Notable for the following components:    .0 (*)     All  other components within normal limits   LACTIC ACID, PLASMA - Normal   RAPID SARS-COV-2 BY PCR - Normal   CBC WITH DIFFERENTIAL WITH PLATELET    Narrative:     The following orders were created for panel order CBC With Differential With Platelet.  Procedure                               Abnormality         Status                     ---------                               -----------         ------                     CBC W/ DIFFERENTIAL[[id number]]          Abnormal            Final result                 Please view results for these tests on the individual orders.   PROCALCITONIN   BLOOD CULTURE   BLOOD CULTURE     EKG    Rate, intervals and axes as noted on EKG Report.  Rate: 76  Rhythm: Sinus Rhythm  Reading: No acute appearing ST elevation or depression, appears similar to 3/25/2024            CT CHEST HI RESOLUTION (CPT=71250)    Result Date: 7/23/2024  PROCEDURE:  CT CHEST HI RESOLUTION (CPT=71250)  COMPARISON:  [facility], CT, CT CHEST LD NO CAD(CPT=71250), 11/15/2021, 1:44 PM.  Chest CT 2/13/2021  INDICATIONS:  M05.10 Rheumatoid lung (HCC) M80.00XA 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, initial encounter M06.9 Rheumatoid arthritis involving multi*  TECHNIQUE:  High-resolution thin slices CT images were obtained at non-contiguous intervals from the lung apices to the lung bases. 1.5mm high resolution axial sections at 10mm intervals in the prone position. 2.5mm high resolution axial contiguous images through the lungs in the supine position with three dimensional coronal reconstruction of the data set. Dose reduction techniques were used. Dose information is transmitted to the ACR (American College of Radiology) NRDR (National Radiology Data Registry) which includes the Dose Index Registry.  PATIENT STATED HISTORY: (As transcribed by Technologist)  Patient here for Rheumatoid lung.    FINDINGS:  PARENCHYMA:  The central airways demonstrate secretions present within right mainstem  bronchus.  The trachea appears grossly patent.  Motion artifact somewhat limits assessment of the lungs.  Again noted are scattered areas of patchy airspace opacity.  These appear progressed when compared with prior exams.  Representative areas of progression noted at the right lung base (image 120 of series 301) and in the region of the right middle lobe anteriorly (image 116).  These areas now demonstrate more cavitation and more consolidation.  A micro nodule of the right upper lobe measuring 5-6 mm on image 46 is unchanged.  There is honeycombing at the right lung base which is stable to slightly progressed. BRONCHIECTASIS:  Cystic bronchiectasis is present. PLEURA:  Calcified pleural plaque formation is present which is stable.  No pleural effusion is seen. MEDIASTINUM:  Numerous scattered subcentimeter lymph nodes are present.  None appear frankly enlarged by CT criteria. CARDIAC:  Moderate to severe calcific atherosclerosis of the coronary arteries is present. OTHER:  Negative.            CONCLUSION:  Again noted are fibrotic changes throughout the lungs with areas of honeycombing at the right lung base with cystic bronchiectasis.  There are areas of progressive fibrotic change with cavitation.  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  Continued follow-up is suggested.  A micro nodule of the right upper lobe is essentially unchanged dating back to 2/13/2021 which is suggestive of benign etiology.  NOTE:  This high-resolution chest CT examination is designed for evaluation of interstitial lung disease, bronchiectasis and emphysema and is therefore not the examination of choice for adenopathy or other more general indications.    LOCATION:  [facility]    Dictated by (CST): Vladislav Griffin MD on 7/23/2024 at 8:37 AM     Finalized by (CST): Vladislav Griffin MD on 7/23/2024 at 8:46 AM                     MDM        Differential diagnosis includes pneumonia, viral  syndrome, pulmonary edema    Chemistry, CBC and lactic acid are normal.    COVID-negative        XR CHEST AP PORTABLE  (CPT=71045)    Result Date: 7/24/2024  PROCEDURE:  XR CHEST AP PORTABLE  (CPT=71045)  TECHNIQUE:  AP chest radiograph was obtained.  COMPARISON:  [facility], CT, CT CHEST HI RESOLUTION (CPT=71250), 7/23/2024, 7:17 AM.  [facility], XR, XR CHEST PA + LAT CHEST (RVC=90853), 4/11/2024, 10:55 AM.  INDICATIONS:  cough, gagging annd shortness of breatn 1 week  PATIENT STATED HISTORY: (As transcribed by Technologist)  Patient has been coughing, shortness of breath, and gagging for 1 week. Patient denied chest pains.    FINDINGS:  Lung volumes are satisfactory.  Pleural parenchymal opacities are seen, similar to the prior.  Recent CT demonstrating extensive fibrotic changes in a pattern consistent with UIP.  Calcified plaque is seen at the left lung base which may be related to prior asbestos exposure.  No definite new consolidation or pleural effusion.  No pneumothorax.  Heart and pulmonary vessels appear stable, with mild cardiomegaly allowing for portable technique.  Smooth mediastinal contours.  Prior lower thoracic vertebral augmentation is again seen.             CONCLUSION:  No acute changes.  Continued clinical correlation recommended.   LOCATION:  [facility]      Dictated by (CST): Gilberto Fleming MD on 7/24/2024 at 7:54 PM     Finalized by (CST): Gilberto Fleming MD on 7/24/2024 at 7:56 PM             Case discussed with Dr. Botello pulmonary.  Reviewed CT scan.  She thinks findings may be consistent with rheumatoid lung and the patient may need prednisone but if he is currently stable she would prefer to see him in clinic and obtain outpatient PFTs prior to starting anything other than Mucinex and albuterol.  This was discussed with family who agrees with this plan.  Dr. Botello will be able to expedite follow-up and call the patient's family tomorrow              Medical Decision Making  Amount and/or  Complexity of Data Reviewed  Independent Historian: caregiver     Details: Wife and daughter at the bedside        Disposition and Plan     Clinical Impression:  1. Fibrotic lung diseases (HCC)         Disposition:  Discharge  7/24/2024  8:54 pm    Follow-up:  Marion Botello MD  [address]  [facility] [id number] [phone]    Call            Medications Prescribed:  Current Discharge Medication List

## 2024-07-25 ENCOUNTER — TELEPHONE (OUTPATIENT)
Dept: FAMILY MEDICINE CLINIC | Facility: CLINIC | Age: 84
End: 2024-07-25

## 2024-07-25 ENCOUNTER — PATIENT OUTREACH (OUTPATIENT)
Dept: CASE MANAGEMENT | Age: 84
End: 2024-07-25

## 2024-07-25 LAB
ATRIAL RATE: 76 BPM
P AXIS: 1 DEGREES
P-R INTERVAL: 222 MS
PROCALCITONIN SERPL-MCNC: 0.1 NG/ML (ref ?–0.05)
Q-T INTERVAL: 438 MS
QRS DURATION: 132 MS
QTC CALCULATION (BEZET): 492 MS
R AXIS: -39 DEGREES
T AXIS: 83 DEGREES
VENTRICULAR RATE: 76 BPM

## 2024-07-25 NOTE — TELEPHONE ENCOUNTER
Could do a one time depomedrol 80 IM injection at [facility] Thursday PM whenever Dr. Olivo is done. Or Friday. Leigha could do it whenever done rooming for Dr. TAL COLE visit. Need BP first.

## 2024-07-25 NOTE — DISCHARGE INSTRUCTIONS
Use albuterol inhaler 2 puffs 4 times a day.  Try over-the-counter Mucinex daily.    Dr. Botello will have her office call you tomorrow for close follow-up.    She would prefer obtaining PFTs before starting medication such as prednisone

## 2024-07-25 NOTE — TELEPHONE ENCOUNTER
Pt seen in ED 7/24 for Fibrotic lung disease & to f/u with Pulm. Dtr requesting order for wheelchair. Do you want to see pt for a f/u appt or are you OK doing addendum to 4/3 OV note to support pt needing wheelchair?

## 2024-07-25 NOTE — TELEPHONE ENCOUNTER
Spoke to pt daughter offered depo injection in [place] today or [place] office tomorrow. Appt made for [place] office tomorrow.

## 2024-07-25 NOTE — TELEPHONE ENCOUNTER
Severiano SYKES called to relay a message from patients daughter Mona.    Daughter would like to get an order for a portable wheelchair. She would like a call back to discuss how to go about this.

## 2024-07-25 NOTE — PROGRESS NOTES
Pt had recent ED visit, calling to offer [facility] ED follow-up apt (discharged 07/24)    Dr Simba Nunez  [facility]  [address]  [place] [address]  [phone]  Pt daughter, Mona declined apt; pt daughter would like to see Pulmonary first  Pt daughter advised after she talks w/Pulmonary, if needed she will call and schedule w/Dr Nunez.  Pt daughter asking if she could get an order for a portable wheelchair - called the office and asked them to send a msg to have office call the pt daughter to get that order.  Closing encounter

## 2024-07-26 ENCOUNTER — NURSE ONLY (OUTPATIENT)
Dept: RHEUMATOLOGY | Facility: CLINIC | Age: 84
End: 2024-07-26
Payer: MEDICARE

## 2024-07-26 ENCOUNTER — OFFICE VISIT (OUTPATIENT)
Facility: CLINIC | Age: 84
End: 2024-07-26
Payer: MEDICARE

## 2024-07-26 VITALS
WEIGHT: 148 LBS | RESPIRATION RATE: 16 BRPM | OXYGEN SATURATION: 97 % | DIASTOLIC BLOOD PRESSURE: 58 MMHG | SYSTOLIC BLOOD PRESSURE: 106 MMHG | HEIGHT: 61 IN | HEART RATE: 76 BPM | BODY MASS INDEX: 27.94 KG/M2

## 2024-07-26 DIAGNOSIS — R06.00 DYSPNEA AND RESPIRATORY ABNORMALITIES: ICD-10-CM

## 2024-07-26 DIAGNOSIS — R05.2 SUBACUTE COUGH: ICD-10-CM

## 2024-07-26 DIAGNOSIS — M06.9 RHEUMATOID ARTHRITIS INVOLVING MULTIPLE SITES, UNSPECIFIED WHETHER RHEUMATOID FACTOR PRESENT (HCC): Primary | ICD-10-CM

## 2024-07-26 DIAGNOSIS — R06.89 DYSPNEA AND RESPIRATORY ABNORMALITIES: ICD-10-CM

## 2024-07-26 DIAGNOSIS — J84.9 INTERSTITIAL LUNG DISEASE (HCC): Primary | ICD-10-CM

## 2024-07-26 PROCEDURE — 99204 OFFICE O/P NEW MOD 45 MIN: CPT | Performed by: INTERNAL MEDICINE

## 2024-07-26 PROCEDURE — 96372 THER/PROPH/DIAG INJ SC/IM: CPT | Performed by: INTERNAL MEDICINE

## 2024-07-26 RX ORDER — FLUTICASONE FUROATE AND VILANTEROL 100; 25 UG/1; UG/1
1 POWDER RESPIRATORY (INHALATION) DAILY
Qty: 1 EACH | Refills: 5 | Status: SHIPPED | OUTPATIENT
Start: 2024-07-26

## 2024-07-26 RX ORDER — METHYLPREDNISOLONE ACETATE 80 MG/ML
80 INJECTION, SUSPENSION INTRA-ARTICULAR; INTRALESIONAL; INTRAMUSCULAR; SOFT TISSUE ONCE
Status: COMPLETED | OUTPATIENT
Start: 2024-07-26 | End: 2024-07-26

## 2024-07-26 RX ADMIN — METHYLPREDNISOLONE ACETATE 80 MG: 80 INJECTION, SUSPENSION INTRA-ARTICULAR; INTRALESIONAL; INTRAMUSCULAR; SOFT TISSUE at 15:29:00

## 2024-07-26 NOTE — PATIENT INSTRUCTIONS
Plan-- to begin breo-- one puff every morning           - to begin saline nasal spray 2-4 times a day           - to begin flutter valve -daily           - consider mucinex twice a day           - to schedule PFTS - [phone]           - to get 6 minute walk next week           - I will talk to dr huffman           - see me in 3 weeks     Marion Botello MD  Pulmonary Medicine  7/26/2024

## 2024-07-26 NOTE — H&P
Pulmonary Consult Note    History of Present Illness:  Carlos Howell is a 84 year old male presenting to pulmonary clinic today for ILD   Was told TB - - as kid- - unsure if treated - positive - gold - -no treatment known -   Increased pain - all joints - no cp   Had seen yisel in the past   Increased dyspnea last few weeks -   Increased coughing - some yellow - lemon - throat clearing then mucus gets stuck - ongoing - coughing spasms -   No trouble swallowing -   Some coughing with eating   No fevers -     Liver doctor - told to avoid methotrexate   No prednsione now -   Admitted with melana - needed banding -   Albuterol given - some times better and helps  -- 1-2 times   Short of breath wallking in here from waiting room but ? Comes and goes  -   Prior to eating and after eating- coughing - coughs at night - chokes at night -   No hx JONAS - - -no known but has awaknes gasping in past - past one year -  Smoked- quit 20 yrs - - given albuterol 5 yrs ago --unsure if it helps              Past Medical History:   Past Medical History:    Anesthesia complication    post-op hiccups    Arthritis    CAD (coronary artery disease)    s/p CABG    Cancer (HCC)    skin    Current episode of major depressive disorder without prior episode    Disorder of liver    cirrhosis    Esophageal reflux    Hearing impairment    High blood pressure    High cholesterol    History of 2019 novel coronavirus disease (COVID-19)    HTN (hypertension)    Rheumatoid arthritis (HCC)    Cirrhosis - seeing dr navarro - few months ago - dr herron - varices banded for repeat scope  - - 2021? Had abnl CT - thought ? For years - for MRI   + gold - thinks sick in the past as kid -- no known treatment   RA   HTN- /cad - had bypass - 2006 - seeing cardio - once a year-     Past Surgical History:   Past Surgical History:   Procedure Laterality Date    Appendectomy      Bypass surgery      Cabg      x4 vessels    Repair ing hernia,5+y/o,reducibl          Family Medical History:   Family History   Problem Relation Age of Onset    Other (Other) Father        Social History:   Social History     Socioeconomic History    Marital status:    Tobacco Use    Smoking status: Former     Current packs/day: 0.00     Average packs/day: 1 pack/day for 50.0 years (50.0 ttl pk-yrs)     Types: Cigarettes     Start date: 1956     Quit date: 3/26/2005     Years since quittin.3     Passive exposure: Past    Smokeless tobacco: Never    Tobacco comments:     quit smoking about 6 yrs ago   Vaping Use    Vaping status: Never Used   Substance and Sexual Activity    Alcohol use: Not Currently    Drug use: Never   Other Topics Concern    Caffeine Concern No    Exercise Yes    Seat Belt Yes    Special Diet No    Stress Concern No    Weight Concern No   Adalid- tar and some asbestosis  2 cats - no birds - healthy cats     Allergies: Azithromycin     Medications:   Current Outpatient Medications   Medication Sig Dispense Refill    carvedilol 3.125 MG Oral Tab Take 1 tablet (3.125 mg total) by mouth 2 (two) times daily.      hydroxychloroquine 200 MG Oral Tab Take 2 tablets (400 mg total) by mouth daily. 180 tablet 3    rosuvastatin 5 MG Oral Tab Take 1 tablet (5 mg total) by mouth nightly. 90 tablet 1    albuterol 108 (90 Base) MCG/ACT Inhalation Aero Soln Inhale 2 puffs into the lungs every 6 (six) hours as needed for Wheezing. 1 each 0    Cholecalciferol (VITAMIN D) 50 MCG (2000 UT) Oral Tab Take 2,000 Units by mouth daily.         Review of Systems: weight - sl down recently   Ongoing coughing with swallowing at itmes though denies swallowinng issues   Ongoing reflux-- now off PPI and notes- ? Told not to take Nexium unclear  No swelling   Sleeping OK - awakenes as above   Unaware of significant snoring    Physical Exam:  /58 (BP Location: Right arm, Patient Position: Sitting, Cuff Size: adult)   Pulse 76   Resp 16   Ht 5' 1\" (1.549 m)   Wt 148 lb (67.1 kg)    SpO2 97%   BMI 27.96 kg/m²    Constitutional: comfortable . No acute distress.  Seems connected  HEENT: Head NC/AT. PEERL. Throat is clear no evidence of thrush--nares is mildly injected  Cardio: Regular rate and rhythm. Normal S1 and S2.  questionable short systolic murmur  Respiratory: Thorax symmetrical with no labored breathing.  Diminished tones throughout-bibasilar rales slightly coarse one third up  GI:  Abd soft, non-tender.  No obvious hepatosplenomegaly or ascites  Extremities: No clubbing or cyanosis   No calf tenderness.  Lower extremity significant varicosities with trace edema no evidence of clubbing  Neurologic: A&Ox3. No gross motor deficits.  Skin: Warm, dry.no rashes or hives noted   Lymphatic: No cervical or supraclavicular lymphadenopathy.  No obvious JVD  Psych: Calm, cooperative. Pleasant affect.    Results:  Reviewed     Assessment/Plan:    # ILD-evidence of progression from 2021 including increased honeycombing-biopsy had been recommended at that time--differential includes RA-ILD (suspected) CPFE (prior PFTs suggested)vs grd/aspritiaon  versus IPF.  Significant component bronchiectasis  Plan at this time to stage, check O2 needs-and focus on symptoms at this time  Regardless of cause would be a candidate for Ofev-may be limited by liver disease    # RA -longstanding history-    # + gold --October 2022--some mention made of prior treatment at [facility]-family unaware of prior treatment  May need to consider treatment for latent TB      # cad remote bypass   Denies active symptoms  Echo 2022 with grade 1 diastolic dysfunction, EF 40 to 45%  RV SP 33 mmHg-denies fluid issues or chest pain      # cirrohosis -noted on ultrasound 3/2024  Presented with melena found varices and underwent banding at that time  Portal gastropathy with resolved varices on scope in May  Recent visit with Dr. Huffman thought cryptogenic-recommended avoiding methotrexate  - on coreg - MRI pending     # cough  Main  complaint including difficulty clearing mucus-plan for bronchodilators/mucolytic's/flutter valve  Awaiting testing    # GERD  Remains a chief complaint as well--was told to stop Nexium unclear--plan to trial Pepcid      Plan-- to begin breo-- one puff every morning           - to begin saline nasal spray 2-4 times a day           - to begin flutter valve -daily           - to schedule PFTS - [phone]           - to get 6 minute walk next week           - I will talk to dr huffman --discussed at length plan to await ongoing evaluation-he is considering Rituxan          - see me in 3 weeks     Marion Botello MD  Pulmonary Medicine  7/26/2024

## 2024-07-28 NOTE — TELEPHONE ENCOUNTER
Discussed with Dr. Botello about case. Considering anti fibrotic. May need LTBI treatment.    Have pt see me Aug 1st, add on at 11:40 in [place] if they can make it.

## 2024-08-06 LAB
ANTI-EJ: NEGATIVE
ANTI-JO-1: <20 UNITS
ANTI-KU: NEGATIVE
ANTI-MDA-5: <20 UNITS
ANTI-MI-2 AB: NEGATIVE
ANTI-NXP-2: <20 UNITS
ANTI-OJ: NEGATIVE
ANTI-PL-12: NEGATIVE
ANTI-PL-7: NEGATIVE
ANTI-PM/SCL100: <20 UNITS
ANTI-SAE1: <20 UNITS
ANTI-SRP AB: NEGATIVE
ANTI-SSA 52KD IGG: <20 UNITS
ANTI-TIF-1GAMMA: <20 UNITS
ANTI-U1 RNP: <20 UNITS
ANTI-U2 RNP: NEGATIVE
ANTI-U3 RNP: NEGATIVE

## 2024-08-09 NOTE — TELEPHONE ENCOUNTER
Mona called back and just wanted to confirm the process for obtaining the wheelchair.  Questions answered at this time.

## 2024-08-09 NOTE — TELEPHONE ENCOUNTER
Called patient's daughter to check in if she has found a wheelchair yet for patient?  Did place the order thru [facility] [facility]

## 2024-08-21 ENCOUNTER — TELEPHONE (OUTPATIENT)
Facility: CLINIC | Age: 84
End: 2024-08-21

## 2024-08-21 NOTE — TELEPHONE ENCOUNTER
Received fax from [facility] requesting pulmonary clearance request for patients upcoming EGD @ [facility].    Placed fax in Dr casas basket for signing. Once signing completed by dr casas, will fax form back to [facility].

## 2024-08-21 NOTE — TELEPHONE ENCOUNTER
Pt has appointment scheduled with Dr. Botello on 8-23-24.  Placed for her review with pt's chart and she will review at that time.

## 2024-08-23 ENCOUNTER — OFFICE VISIT (OUTPATIENT)
Facility: CLINIC | Age: 84
End: 2024-08-23
Payer: MEDICARE

## 2024-08-23 VITALS — WEIGHT: 115 LBS | RESPIRATION RATE: 16 BRPM | OXYGEN SATURATION: 93 % | BODY MASS INDEX: 21.71 KG/M2 | HEIGHT: 61 IN

## 2024-08-23 DIAGNOSIS — R05.2 SUBACUTE COUGH: ICD-10-CM

## 2024-08-23 DIAGNOSIS — J84.9 ILD (INTERSTITIAL LUNG DISEASE) (HCC): Primary | ICD-10-CM

## 2024-08-23 DIAGNOSIS — R06.89 DYSPNEA AND RESPIRATORY ABNORMALITIES: ICD-10-CM

## 2024-08-23 DIAGNOSIS — R06.00 DYSPNEA AND RESPIRATORY ABNORMALITIES: ICD-10-CM

## 2024-08-23 PROCEDURE — 99214 OFFICE O/P EST MOD 30 MIN: CPT | Performed by: INTERNAL MEDICINE

## 2024-08-23 NOTE — PATIENT INSTRUCTIONS
Plan-- to continue  breo-- one puff every morning           - to begin flutter valve -daily           - to schedule PFTS - [phone] with 6 minute walk           - cleared for scope            - ensure or BOOST - carnation instant breakfast - ensure clear           - see me in 4 weeks     Marion Botello MD  Pulmonary Medicine  8/23/2024

## 2024-08-23 NOTE — PROGRESS NOTES
Pulmonary Consult Note    History of Present Illness:  Carlos Howell is a 84 year old male presenting to pulmonary clinic today for ILD   Was told TB - - as kid- - unsure if treated - positive - gold - -no treatment known -   Increased pain - all joints - no cp   Had seen yisel in the past   Increased dyspnea last few weeks -   Increased coughing - some yellow - lemon - throat clearing then mucus gets stuck - ongoing - coughing spasms -   No trouble swallowing -   Some coughing with eating   No fevers -     Liver doctor - told to avoid methotrexate   No prednsione now -   Admitted with melana - needed banding -   Albuterol given - some times better and helps  -- 1-2 times   Short of breath wallking in here from waiting room but ? Comes and goes  -   Prior to eating and after eating- coughing - coughs at night - chokes at night -   No hx JONAS - - -no known but has awaknes gasping in past - past one year -  Smoked- quit 20 yrs - - given albuterol 5 yrs ago --unsure if it helps    8/24- daughter here   Cough is much much improved on the Breo  Rare episode with swallowing now though remains at times  He denies any difficulty swallowing  No specific shortness of breath noted, not very active relates to increasing rheumatoid arthritis pain  Plan for MRI of liver                Past Medical History:   Past Medical History:    Anesthesia complication    post-op hiccups    Arthritis    CAD (coronary artery disease)    s/p CABG    Cancer (HCC)    skin    Current episode of major depressive disorder without prior episode    Disorder of liver    cirrhosis    Esophageal reflux    Hearing impairment    High blood pressure    High cholesterol    History of 2019 novel coronavirus disease (COVID-19)    HTN (hypertension)    Rheumatoid arthritis (HCC)    Cirrhosis - seeing dr navarro - few months ago - dr herron - varices banded for repeat scope  - - 2021? Had abnl CT - thought ? For years - for MRI   + gold - thinks sick in  the past as kid -- no known treatment   RA   HTN- /cad - had bypass -  - seeing cardio - once a year-     Past Surgical History:   Past Surgical History:   Procedure Laterality Date    Appendectomy      Bypass surgery      Cabg      x4 vessels    Repair ing hernia,6+y/o,reducibl         Family Medical History:   Family History   Problem Relation Age of Onset    Other (Other) Father        Social History:   Social History     Socioeconomic History    Marital status:    Tobacco Use    Smoking status: Former     Current packs/day: 0.00     Average packs/day: 1 pack/day for 50.0 years (50.0 ttl pk-yrs)     Types: Cigarettes     Start date: 1956     Quit date: 3/26/2005     Years since quittin.3     Passive exposure: Past    Smokeless tobacco: Never    Tobacco comments:     quit smoking about 6 yrs ago   Vaping Use    Vaping status: Never Used   Substance and Sexual Activity    Alcohol use: Not Currently    Drug use: Never   Other Topics Concern    Caffeine Concern No    Exercise Yes    Seat Belt Yes    Special Diet No    Stress Concern No    Weight Concern No   Adalid- tar and some asbestosis  2 cats - no birds - healthy cats     Allergies: Azithromycin     Medications:   Current Outpatient Medications   Medication Sig Dispense Refill    fluticasone furoate-vilanterol (BREO ELLIPTA) 100-25 MCG/ACT Inhalation Aerosol Powder, Breath Activated Inhale 1 puff into the lungs daily. 1 each 5    carvedilol 3.125 MG Oral Tab Take 1 tablet (3.125 mg total) by mouth 2 (two) times daily.      hydroxychloroquine 200 MG Oral Tab Take 2 tablets (400 mg total) by mouth daily. 180 tablet 3    rosuvastatin 5 MG Oral Tab Take 1 tablet (5 mg total) by mouth nightly. 90 tablet 1    albuterol 108 (90 Base) MCG/ACT Inhalation Aero Soln Inhale 2 puffs into the lungs every 6 (six) hours as needed for Wheezing. 1 each 0    Cholecalciferol (VITAMIN D) 50 MCG (2000 UT) Oral Tab Take 2,000 Units by mouth daily.         Review  of Systems: weight - sl down recently   Ongoing coughing with swallowing at itmes though denies swallowinng issues   Ongoing reflux-- now off PPI and notes- ? Told not to take Nexium unclear  No swelling   Sleeping OK - awakenes as above   Unaware of significant snoring    Physical Exam:  Resp 16   Ht 5' 1\" (1.549 m)   Wt 115 lb (52.2 kg)   SpO2 93%   BMI 21.73 kg/m²    Constitutional: comfortable . No acute distress.  Seems connected  HEENT: Head NC/AT. PEERL. Throat is clear no evidence of thrush--nares is mildly injected  Cardio: Regular rate and rhythm. Normal S1 and S2.  questionable short systolic murmur  Respiratory: Thorax symmetrical with no labored breathing.  Diminished tones throughout-bibasilar rales slightly coarse one third up- or more now   GI:  Abd soft, non-tender.  No obvious hepatosplenomegaly or ascites  Extremities: No clubbing or cyanosis   No calf tenderness.  Lower extremity significant varicosities with trace edema at most no evidence of clubbing  Neurologic: A&Ox3. No gross motor deficits.  Skin: Warm, dry.no rashes or hives noted   Lymphatic: No cervical or supraclavicular lymphadenopathy.  No obvious JVD  Psych: Calm, cooperative. Pleasant affect.    Results:  Reviewed     Assessment/Plan:    # ILD-evidence of progression from 2021 including increased honeycombing-biopsy had been recommended at that time--differential includes RA-ILD (suspected) CPFE (prior PFTs suggested)vs grd/aspritiaon  versus IPF.  Significant component bronchiectasis  Plan at this time to stage, check O2 needs-and focus on symptoms at this time  Regardless of cause would be a candidate for Ofev-may be limited by liver disease  8/24 plans in place for comparative PFTs-plan to review with Dr. Huffman concerning risks of Ofev.  Plans for possible Rituxan    # RA -longstanding history-discussed with dr huffman - possible rituxin --     # + gold --October 2022 documented though family believes at least 10 years ago it  was noted--some mention made of prior treatment at [facility]-family unaware of prior treatment  May need to consider treatment for latent TB  Thinks + 10 yrs ago - - and unclear if treated  at that time - -       # cad remote bypass   Denies active symptoms  Echo 2022 with grade 1 diastolic dysfunction, EF 40 to 45%  RV SP 33 mmHg-denies fluid issues or chest pain      # cirrohosis -noted on ultrasound 3/2024  Presented with melena found varices and underwent banding at that time  Portal gastropathy with resolved varices on scope in May  Recent visit with Dr. Huffman thought cryptogenic-recommended avoiding methotrexate  - on coreg - MRI pending     # cough  Main complaint including difficulty clearing mucus-plan for bronchodilators/mucolytic's/flutter valve  Awaiting testing  8/24- cough much better - with breo continue present management    # GERD  Remains a chief complaint as well--was told to stop Nexium unclear--plan to trial Pepcid  8/24- told to hold off per dr herron   Plans in place for repeat upper scope    # weight loss ongoing -   Consider supplements discussed      Plan-- to continue  breo-- one puff every morning           - to begin flutter valve -daily           - to schedule PFTS - [phone] with 6 minute walk           - cleared for scope            - ensure or BOOST - carnation instant breakfast - ensure clear           - see me in 4 weeks     Marion Botello MD  Pulmonary Medicine  8/23/2024

## 2024-08-27 ENCOUNTER — HOSPITAL ENCOUNTER (OUTPATIENT)
Dept: MRI IMAGING | Facility: HOSPITAL | Age: 84
Discharge: HOME | End: 2024-08-27
Attending: INTERNAL MEDICINE
Payer: MEDICARE

## 2024-08-27 DIAGNOSIS — K74.60 CIRRHOSIS OF LIVER WITHOUT ASCITES, UNSPECIFIED HEPATIC CIRRHOSIS TYPE (HCC): ICD-10-CM

## 2024-08-27 PROCEDURE — 74183 MRI ABD W/O CNTR FLWD CNTR: CPT | Performed by: INTERNAL MEDICINE

## 2024-08-27 PROCEDURE — A9575 INJ GADOTERATE MEGLUMI 0.1ML: HCPCS

## 2024-08-27 RX ORDER — GADOTERATE MEGLUMINE 376.9 MG/ML
15 INJECTION INTRAVENOUS
Status: COMPLETED | OUTPATIENT
Start: 2024-08-27 | End: 2024-08-27

## 2024-08-27 RX ADMIN — GADOTERATE MEGLUMINE 13 ML: 376.9 INJECTION INTRAVENOUS at 16:09:00

## 2024-09-05 ENCOUNTER — OFFICE VISIT (OUTPATIENT)
Dept: RHEUMATOLOGY | Facility: CLINIC | Age: 84
End: 2024-09-05
Payer: MEDICARE

## 2024-09-05 VITALS
DIASTOLIC BLOOD PRESSURE: 50 MMHG | HEART RATE: 64 BPM | SYSTOLIC BLOOD PRESSURE: 116 MMHG | WEIGHT: 135 LBS | TEMPERATURE: 98 F | BODY MASS INDEX: 25.49 KG/M2 | HEIGHT: 61 IN | RESPIRATION RATE: 18 BRPM | OXYGEN SATURATION: 98 %

## 2024-09-05 DIAGNOSIS — M06.9 RHEUMATOID ARTHRITIS INVOLVING MULTIPLE SITES, UNSPECIFIED WHETHER RHEUMATOID FACTOR PRESENT (HCC): ICD-10-CM

## 2024-09-05 DIAGNOSIS — M80.00XA AGE-RELATED OSTEOPOROSIS WITH CURRENT PATHOLOGICAL FRACTURE, INITIAL ENCOUNTER: ICD-10-CM

## 2024-09-05 DIAGNOSIS — R76.12 POSITIVE QUANTIFERON-TB GOLD TEST: ICD-10-CM

## 2024-09-05 DIAGNOSIS — Z79.899 IMMUNODEFICIENCY DUE TO DRUG THERAPY (HCC): ICD-10-CM

## 2024-09-05 DIAGNOSIS — D84.821 IMMUNODEFICIENCY DUE TO DRUG THERAPY (HCC): ICD-10-CM

## 2024-09-05 DIAGNOSIS — Z51.81 THERAPEUTIC DRUG MONITORING: ICD-10-CM

## 2024-09-05 DIAGNOSIS — E83.50 CALCIUM METABOLISM DISORDER: ICD-10-CM

## 2024-09-05 DIAGNOSIS — Z22.7 LTBI (LATENT TUBERCULOSIS INFECTION): ICD-10-CM

## 2024-09-05 DIAGNOSIS — M05.10 RHEUMATOID LUNG (HCC): ICD-10-CM

## 2024-09-05 DIAGNOSIS — R76.8 P-ANCA TITER POSITIVE: ICD-10-CM

## 2024-09-05 DIAGNOSIS — M05.10 RHEUMATOID LUNG DISEASE (HCC): Primary | ICD-10-CM

## 2024-09-05 PROCEDURE — 99215 OFFICE O/P EST HI 40 MIN: CPT | Performed by: INTERNAL MEDICINE

## 2024-09-05 PROCEDURE — G2211 COMPLEX E/M VISIT ADD ON: HCPCS | Performed by: INTERNAL MEDICINE

## 2024-09-05 NOTE — PROGRESS NOTES
Rheumatology f/u Patient Note  =====================================================================================================    Chief complaint: RA  Chief Complaint   Patient presents with    Rheumatoid Arthritis     3 month f/u. Feeling a little bit better. Shoulders, arms, wrists, and leg pain. Some hand swelling. Stiffness in the morning. Converted rapid score of 6.0     PCP  Simba Nunez MD  Fax: [phone]  Phone: [phone]    =====================================================================================================  HPI   Date of visit: 5/30/2024    Carlos Howell is a 84 year old male     Here with daughter and wife.  Seropositive, + CCP RA x 15 years  All joints are painful today. Difficulty lifting arms. New diagnosis of cirrhosis of the liver, decompensated with EV s/p banding. Had to stop his chronic steroid due to the esophageal varices.  GI told him to stop the prednisone given concerns of worsening varices in the setting of his decompensated hepatic failure    -Currently taking just hydroxychloroquine 200 mg daily.  -Mild dyspnea on exertion.  Prior imaging demonstrates some ILD changes in addition to emphysema changes.  Also previously worked as a , some pleural thickening noted.  -Notes that when he was younger, he was treated for active tuberculosis.  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  -Diagnosed and hospitalized for pneumonia in March 2024.  -History of CAD s/p CABG  -Previously on Prolia for osteoporosis.  Has been off Prolia for 1 year    Prior medications:  Methotrexate: stopped due to cirrhosis  Prednisone 5 mg: stopped due to cirrhosis, EV    ==============================================================================================================  Today's Visit: 09/05/24    Seeing pulmonary.  Noted progression of ILD/honeycombing over time.  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  -Here with daughter who is translating and wife.  On reevaluation of  positive QuantiFERON gold, they deny any definitive treatment for TB in the past.  -Has not seen a cardiologist recently.  Patient with history of CABG in the distant past.  History of HFrEF  -Cirrhosis: Continue to follow-up with Dr. Huffman.  MRI of the liver looked okay outside of cirrhosis.  -Feel a bit better with hydroxychloroquine 2 pills daily, increased from 1 pill daily previously.  Shoulders and hands and wrist still painful.      5 point ROS negative except noted above  I had reviewed past medical and family histories together with allergy and medication lists documented.      Medications:  Current Outpatient Medications on File Prior to Visit   Medication Sig Dispense Refill    fluticasone furoate-vilanterol (BREO ELLIPTA) 100-25 MCG/ACT Inhalation Aerosol Powder, Breath Activated Inhale 1 puff into the lungs daily. 1 each 5    carvedilol 3.125 MG Oral Tab Take 1 tablet (3.125 mg total) by mouth 2 (two) times daily.      hydroxychloroquine 200 MG Oral Tab Take 2 tablets (400 mg total) by mouth daily. 180 tablet 3    rosuvastatin 5 MG Oral Tab Take 1 tablet (5 mg total) by mouth nightly. 90 tablet 1    albuterol 108 (90 Base) MCG/ACT Inhalation Aero Soln Inhale 2 puffs into the lungs every 6 (six) hours as needed for Wheezing. 1 each 0    Cholecalciferol (VITAMIN D) 50 MCG (2000 UT) Oral Tab Take 2,000 Units by mouth daily.       No current facility-administered medications on file prior to visit.     ?  Allergies:  Allergies   Allergen Reactions    Azithromycin RASH     Face flushing         Objective    Vitals:    09/05/24 1143   BP: 116/50   Pulse: 64   Resp: 18   Temp: 98 °F (36.7 °C)   SpO2: 98%   Weight: 135 lb (61.2 kg)   Height: 5' 1\" (1.549 m)     GEN: NAD, well-nourished.   HEENT: Head: NCAT. Face: No lesions. Eyes: Conjunctiva clear.   PULM:  easy effort  Extremities: No cyanosis, edema or deformities.   Neurologic: Strength, CN2-12 grossly intact   Skin: No lesions or rashes.  MSK: 28 joint  count performed. No evidence of synovitis in mcp, pip, dip, wrist, elbows, shoulders, hips, knees, ankles, mtp unless otherwise noted. Full ROM of elbows, wrists, knees.       PtGA: 7  SJ: 3 (bilateral wrists, MCPs, right knee)  TJ: 5 (bilateal shouldes)  MDGA: 5    CDAI: 20    -Significant osteochondral hypertrophy of the bilateral wrists, fingers.        Wt Readings from Last 6 Encounters:   09/05/24 135 lb (61.2 kg)   08/23/24 115 lb (52.2 kg)   07/26/24 148 lb (67.1 kg)   07/24/24 150 lb (68 kg)   05/30/24 143 lb (64.9 kg)   05/01/24 150 lb (68 kg)       ?  Labs:    7/2024  +atypical pANCA >1:640  Myositis panel wnl  Vit D 51  Mag/phos wnl  Pth wnl    10/2022  IGRA positive    6/2021  CCP 32.8 elevated  DREAD 1:320 homogenous    CT LD 2021:  LUNGS:  upper lobe subpleural blebs consistent with emphysematous changes.   Bilateral pleural parenchymal scarring.  Diffuse peribronchial thickening.  Subpleural interstitial densities and   nonspecific ground-glass density associated with pleural thickening and nodularity particularly involves the lower lobes with honeycombing, right greater than left.       Lab Results   Component Value Date    WBC 4.8 07/24/2024    RBC 5.02 07/24/2024    HGB 13.6 07/24/2024    HCT 42.7 07/24/2024    .0 (L) 07/24/2024    MCV 85.1 07/24/2024    MCH 27.1 07/24/2024    MCHC 31.9 07/24/2024    RDW 16.1 07/24/2024    NEPRELIM 2.38 07/24/2024    NEUTABS 8.21 (H) 04/03/2023    LYMPHABS 1.55 04/03/2023    EOSABS 0.00 04/03/2023    BASABS 0.00 04/03/2023    NEUT 74 04/03/2023    LYMPH 14 04/03/2023    MON 9 04/03/2023    EOS 0 04/03/2023    BASO 0 04/03/2023    NEPERCENT 49.6 07/24/2024    LYPERCENT 31.0 07/24/2024    MOPERCENT 14.1 07/24/2024    EOPERCENT 3.7 07/24/2024    BAPERCENT 1.2 07/24/2024    NE 2.38 07/24/2024    LYMABS 1.49 07/24/2024    MOABSO 0.68 07/24/2024    EOABSO 0.18 07/24/2024    BAABSO 0.06 07/24/2024     Lab Results   Component Value Date     (H) 07/24/2024     BUN 20 07/24/2024    BUNCREA 14.5 02/13/2021    CREATSERUM 0.79 07/24/2024    ANIONGAP 4 07/24/2024    GFR >59 04/01/2009    GFRNAA 85 05/21/2022    GFRAA 98 05/21/2022    CA 9.0 07/24/2024    OSMOCALC 287 07/24/2024    ALKPHO 134 (H) 07/24/2024    AST 42 (H) 07/24/2024    ALT 29 07/24/2024    BILT 0.3 07/24/2024    TP 7.9 07/24/2024    ALB 2.6 (L) 07/24/2024    GLOBULIN 5.3 (H) 07/24/2024     07/24/2024    K 4.3 07/24/2024     07/24/2024    CO2 26.0 07/24/2024         No results found for: \"ANATI\", \"DREAD\", \"ANAS\", \"ANASCRN\", \"ANASCRNRFLX\", \"ROCHELLE\"  No results found for: \"SSA\", \"SSAUR\", \"ANTISSA\", \"SSA52\", \"SSA60\", \"SSADD\", \"SSB\", \"ANTISSB\"  No results found for: \"DSDNA\", \"ANTIDSDNA\", \"SMUD\", \"ANTISM\", \"SM\", \"RNP\", \"ANTIRNP\", \"SMITHRNP\"  No results found for: \"SCL70\", \"SCL\", \"QSMCCEB85\"  No results found for: \"C3\", \"C4\"  No results found for: \"DRVVT\", \"LAINT\", \"PTTLUPUS\", \"LUPUSINTERP\", \"LA\", \"P8LS5DAYTQ\", \"I5SQ3JRXIM\", \"T0EFGUDQIO\", \"J3NQQIVIQT\"  No results found for: \"CARDIOLIPIGG\", \"CARDIOLIPIGM\", \"CARDIOLIPIGA\", \"CARDIOIGA\", \"CARLIP\"      Additional Labs:    Radiology:  MRI ABDOMEN (W+WO) (CPT=74183)    Result Date: 8/27/2024  CONCLUSION:  1. There is relative enlargement of the caudate lobe with the overall size of the liver appearing small with a slightly nodular contour suggestive of hepatic cirrhosis.  No focal hepatic mass lesions or abnormal enhancement noted. 2. There is a nonenhancing thinly septated cystic focus within the uncinate process of the pancreas measuring up to 1.4 cm.  The possibility of IPMN cannot be excluded.  If clinically indicated this may be follow-up based on ACR white paper guidelines.  Based on 2017 American College of Radiology white paper guidelines, regarding incidental pancreatic cysts, either surveillance imaging with MRI Q6 months x4 then Q 1 year x2 then Q 2 years x3 or endoscopic ultrasound with FNA could be considered depending on other clinical factors and risk  tolerance (these recommendations are based on patient's 79 years of age or younger).  Please note that American college of radiology recommends only 2 MRI follow-ups Q 2 years for patient's in which the cyst is discovered over the age of 80.     LOCATION:  [facility]   Dictated by (CST): Roxana Pedersen DO on 8/27/2024 at 4:39 PM     Finalized by (CST): Roxana Pedersen DO on 8/27/2024 at 4:51 PM       XR CHEST AP PORTABLE  (CPT=71045)    Result Date: 7/24/2024  CONCLUSION:  No acute changes.  Continued clinical correlation recommended.   LOCATION:  [facility]      Dictated by (CST): Gilberto Fleming MD on 7/24/2024 at 7:54 PM     Finalized by (CST): Gliberto Fleming MD on 7/24/2024 at 7:56 PM       CT CHEST HI RESOLUTION (CPT=71250)    Result Date: 7/23/2024  CONCLUSION:  Again noted are fibrotic changes throughout the lungs with areas of honeycombing at the right lung base with cystic bronchiectasis.  There are areas of progressive fibrotic change with cavitation.  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  Continued follow-up is suggested.  A micro nodule of the right upper lobe is essentially unchanged dating back to 2/13/2021 which is suggestive of benign etiology.  NOTE:  This high-resolution chest CT examination is designed for evaluation of interstitial lung disease, bronchiectasis and emphysema and is therefore not the examination of choice for adenopathy or other more general indications.    LOCATION:  [facility]    Dictated by (CST): Vladislav Griffin MD on 7/23/2024 at 8:37 AM     Finalized by (CST): Vladislav Griffin MD on 7/23/2024 at 8:46 AM       XR DEXA BONE DENSITOMETRY (CPT=77080)    Result Date: 7/22/2024  CONCLUSION:  Bone mineral density values for left hip are compatible with the diagnosis of osteopenia by WHO definition (T score between -1.0 and -2.5).  If therapy is initiated, follow-up study is recommended in 2 years for further evaluation of therapeutic efficacy.    Recommendation:  The National Osteoporosis Foundation (NOF) recommends pharmacological treatment for patients with a FRAX 10-year risk score of 3% or higher for a hip fracture or 20% or higher for a major osteoporotic fracture, to prevent osteoporosis and reduce fracture risk.  The World Health Organization has defined the following categories based on bone density: Normal bone:  T-score greater than or equal to -1 Osteopenia: T-score  less than -1 and greater  than -2.5 Osteoporosis:  T-score less than or equal -2.5   LOCATION:  [facility]     Dictated by (CST): Thalia Louis MD on 7/22/2024 at 2:15 PM     Finalized by (CST): Thalia Louis MD on 7/22/2024 at 2:15 PM        Radiology review:  MRI ABDOMEN (W+WO) (CPT=74183)    Result Date: 8/27/2024  CONCLUSION:  1. There is relative enlargement of the caudate lobe with the overall size of the liver appearing small with a slightly nodular contour suggestive of hepatic cirrhosis.  No focal hepatic mass lesions or abnormal enhancement noted. 2. There is a nonenhancing thinly septated cystic focus within the uncinate process of the pancreas measuring up to 1.4 cm.  The possibility of IPMN cannot be excluded.  If clinically indicated this may be follow-up based on ACR white paper guidelines.  Based on 2017 American College of Radiology white paper guidelines, regarding incidental pancreatic cysts, either surveillance imaging with MRI Q6 months x4 then Q 1 year x2 then Q 2 years x3 or endoscopic ultrasound with FNA could be considered depending on other clinical factors and risk tolerance (these recommendations are based on patient's 79 years of age or younger).  Please note that American college of radiology recommends only 2 MRI follow-ups Q 2 years for patient's in which the cyst is discovered over the age of 80.     LOCATION:  [facility]   Dictated by (CST): Roxana Pedersen DO on 8/27/2024 at 4:39 PM     Finalized by (CST): Roxana Pedersen DO on 8/27/2024 at 4:51 PM       =====================================================================================================  Assessment and Plan    Assessment:  1. Rheumatoid lung disease (HCC)    2. LTBI (latent tuberculosis infection)    3. Rheumatoid arthritis involving multiple sites, unspecified whether rheumatoid factor present (HCC)    4. Positive QuantiFERON-TB Gold test    5. Rheumatoid lung (HCC)    6. 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, initial encounter    7. Calcium metabolism disorder    8. P-ANCA titer positive        #Seropositive (+ CCP) rheumatoid arthritis: Diagnosed in the late 2000's.  -Prior medications: Methotrexate (stopped due to cirrhosis), prednisone (stopped due to decompensated cirrhosis with esophageal varices), leflunomide (stopped due to GI distress)  -CDAI 20 today indicating moderate disease activity. Untreated RA at this activity level long-term will result in irreversible articular damage, joint deformity, and disability. Untreated RA is associated with increased: risk of infection, risk of lymphoma,  risk of major adverse cardiovascular events (MACE), and risk of mortality.     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  -S/p Prolia June 7th. 2024  -Lumbar spine demonstrating L1/L5 compression fractures.     #quant gold positive: Positive in 2022  -Denies any history of LTBI treatment  ?  Plan:  Get blood work    Get the following vaccines:  -need to get all vaccines, then wait 2 weeks before doing Rituximab    -covid/flu  -repeat prevnar 20. Prevnar 20: get another booster.  Given pneumonia in 3/2024.  Last pneumonia vaccine series was in 2019.  Needs a booster.    -RSV: do this separately    See cardiologist given HFrEF (heart failure)    Continue  hydroxychloroquine (plaquenil) 400 mg daily    Consider rituximab:  -Rituximab would not reactivate tuberculosis.  Not sure what LTBI treatments patient will qualify given he has cirrhosis  -Rituximab would help RA and RA-ILD  -Given multiple risk factors for increased risk of an infection, consider just one dose 1000 mg instead of 2  -Update hepatitis B serologies, quantitative immunoglobulins    RA-ILD: RTX as above. Pulm considering Ofev.     Next Prolia in December 2024.  Get osteoporosis labs before Prolia    See infectious disease for LTBI.    I have discussed the risks of Rituxan infusion with the patient/their decision-making surrogate such as infusion reactions, cytopenias, mucocutaneous reactions, and increased risk of infection. I have highlighted possible (but rare) Hepatitis B virus (HBV) reactivation and PATRICK virus infection resulting in progressive multifocal leukoencephalopathy (PML) and death.     We feel, and the patient/their decision making surrogate agree, that the benefits outweigh the risk      Total time spent by me on DOS: 50 min  This includes:   Preparing to see the patient (review of tests, prior medical visits)  Obtaining and/or reviewing separately medically appropriate obtained history  Performing the medically appropriate exam and/or evaluation  Clinical documentation in the EHR or other health record  Counseling and educating the patient or caregiver  Interpreting results and/or communicating results to the patient/family/caregiver  Care coordination  Ordering medications, tests, or procedures  Referring and communicating with other health care professionals  Documentation in EHR      Diagnoses and all orders for this visit:    Rheumatoid lung disease (HCC)  -     ANCA Panel Vasculitis; Future    LTBI (latent tuberculosis infection)  -     Hep B Core AB, Tot; Future  -     Hepatitis B Surface Antibody; Future  -     Hepatitis B Surface Antigen; Future  -     Immunoglobulin A, Quant;  Future  -     Immunoglobulin G, Quant; Future  -     Immunoglobulin M, Quant; Future  -     Comp Metabolic Panel (14); Future  -     CBC With Differential With Platelet; Future  -     C-Reactive Protein; Future  -     Sed Rate, Westergren (Automated); Future  -     Infectious Disease Referral - In Network    Rheumatoid arthritis involving multiple sites, unspecified whether rheumatoid factor present (HCC)  -     Hep B Core AB, Tot; Future  -     Hepatitis B Surface Antibody; Future  -     Hepatitis B Surface Antigen; Future  -     Immunoglobulin A, Quant; Future  -     Immunoglobulin G, Quant; Future  -     Immunoglobulin M, Quant; Future  -     Comp Metabolic Panel (14); Future  -     CBC With Differential With Platelet; Future  -     C-Reactive Protein; Future  -     Sed Rate, Westergren (Automated); Future  -     Magnesium; Future  -     Phosphorus; Future  -     Vitamin D; Future  -     Comp Metabolic Panel (14); Future  -     CBC With Differential With Platelet; Future  -     ANCA Panel Vasculitis; Future    Positive QuantiFERON-TB Gold test  -     Hep B Core AB, Tot; Future  -     Hepatitis B Surface Antibody; Future  -     Hepatitis B Surface Antigen; Future  -     Immunoglobulin A, Quant; Future  -     Immunoglobulin G, Quant; Future  -     Immunoglobulin M, Quant; Future  -     Comp Metabolic Panel (14); Future  -     CBC With Differential With Platelet; Future  -     C-Reactive Protein; Future  -     Sed Rate, Westergren (Automated); Future  -     Magnesium; Future  -     Phosphorus; Future  -     Vitamin D; Future  -     Comp Metabolic Panel (14); Future  -     CBC With Differential With Platelet; Future    Rheumatoid lung (HCC)  -     Hep B Core AB, Tot; Future  -     Hepatitis B Surface Antibody; Future  -     Hepatitis B Surface Antigen; Future  -     Immunoglobulin A, Quant; Future  -     Immunoglobulin G, Quant; Future  -     Immunoglobulin M, Quant; Future  -     Comp Metabolic Panel (14); Future  -      CBC With Differential With Platelet; Future  -     C-Reactive Protein; Future  -     Sed Rate, Westergren (Automated); Future    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, initial encounter  -     Hep B Core AB, Tot; Future  -     Hepatitis B Surface Antibody; Future  -     Hepatitis B Surface Antigen; Future  -     Immunoglobulin A, Quant; Future  -     Immunoglobulin G, Quant; Future  -     Immunoglobulin M, Quant; Future  -     Comp Metabolic Panel (14); Future  -     CBC With Differential With Platelet; Future  -     C-Reactive Protein; Future  -     Sed Rate, Westergren (Automated); Future  -     Magnesium; Future  -     Phosphorus; Future  -     Vitamin D; Future  -     Comp Metabolic Panel (14); Future  -     CBC With Differential With Platelet; Future    Calcium metabolism disorder  -     Magnesium; Future  -     Phosphorus; Future  -     Vitamin D; Future  -     Comp Metabolic Panel (14); Future  -     CBC With Differential With Platelet; Future    P-ANCA titer positive  -     ANCA Panel Vasculitis; Future          Return in about 19 weeks (around 1/16/2025).      The above plan of care, diagnosis, orders, and follow-up were discussed with the patient. Questions related to this recommended plan of care were answered.    Thank you for referring this delightful patient to me. Please feel free to contact me with any questions.     This report was performed utilizing speech recognition software technology. Despite proofreading, speech recognition errors could escape detection. If a word or phrase is confusing or out of context, please do not hesitate to call for   clarification.       Kind regards      Heidi Valles MD  [facility]

## 2024-09-05 NOTE — PATIENT INSTRUCTIONS
Get blood work    Get the following vaccines:  -need to get all vaccines, then wait 2 weeks before doing Rituximab    -covid/flu  -repeat prevnar 20  -RSV: do this separately    See cardiologist given HFrEF (heart failure)    Continue hydroxychloroquine (plaquenil) 400 mg daily    Consider rituximab: one dose 1000 mg daily,    See infectious disease

## 2024-09-09 ENCOUNTER — RT VISIT (OUTPATIENT)
Dept: RESPIRATORY THERAPY | Facility: HOSPITAL | Age: 84
End: 2024-09-09
Attending: INTERNAL MEDICINE
Payer: MEDICARE

## 2024-09-09 DIAGNOSIS — M05.10 RHEUMATOID LUNG (HCC): ICD-10-CM

## 2024-09-09 PROCEDURE — 94010 BREATHING CAPACITY TEST: CPT | Performed by: INTERNAL MEDICINE

## 2024-09-09 PROCEDURE — 94726 PLETHYSMOGRAPHY LUNG VOLUMES: CPT | Performed by: INTERNAL MEDICINE

## 2024-09-09 PROCEDURE — 94729 DIFFUSING CAPACITY: CPT | Performed by: INTERNAL MEDICINE

## 2024-09-16 NOTE — PROCEDURES
Findings:  FEV1 is 1.15L, z-score of -2.30.  FVC is 1.52L, z-score of -2.53.  FEV1/ FVC ratio is 0.76.  The flow-volume loop demonstrates a poorly performed maneuver.  The TLC is 4.00L, z-score of -1.63.  The residual volume 2.48L, z-score of 0.63.  The diffusion capacity is 10.24 with z-score of -2.71. When corrected for alveolar volume, the diffusion capacity is 2.82 with z-score of -1.81.  Impression:  Of note, spirometric values may be underestimated as the maximal voluntary ventilation was reduced suggesting poor effort and/or weakness. Clinical correlation required.  There is no airway obstruction on spirometry.  Lung volumes are technically normal per ATS criteria, although at the low end of normal range.   Diffusion capacity is moderately reduced but improves to mild when considering alveolar volume. The reduced diffusion capacity can be seen in emphysema, interstitial lung disease, pulmonary vascular disease (such as pulmonary hypertension) and anemia. If not already performed, would suggest further evaluation as determined clinically.  There are no previous pulmonary function tests available for comparison.   Of note, this testing was performed in accordance to ATS/ERS interpretation guidelines with the use of upper and lower limits of normal as well as z-score references. Previous testing (before March 2024) was not performed at [facility] using z-scores and so comparison to previous testing should be taken with caution.

## 2024-09-22 ENCOUNTER — PATIENT MESSAGE (OUTPATIENT)
Facility: CLINIC | Age: 84
End: 2024-09-22

## 2024-09-22 ENCOUNTER — PATIENT MESSAGE (OUTPATIENT)
Dept: FAMILY MEDICINE CLINIC | Facility: CLINIC | Age: 84
End: 2024-09-22

## 2024-09-23 ENCOUNTER — ANESTHESIA (OUTPATIENT)
Dept: ENDOSCOPY | Facility: HOSPITAL | Age: 84
End: 2024-09-23
Payer: MEDICARE

## 2024-09-23 ENCOUNTER — PATIENT MESSAGE (OUTPATIENT)
Dept: FAMILY MEDICINE CLINIC | Facility: CLINIC | Age: 84
End: 2024-09-23

## 2024-09-23 ENCOUNTER — ANESTHESIA EVENT (OUTPATIENT)
Dept: ENDOSCOPY | Facility: HOSPITAL | Age: 84
End: 2024-09-23
Payer: MEDICARE

## 2024-09-23 ENCOUNTER — HOSPITAL ENCOUNTER (OUTPATIENT)
Facility: HOSPITAL | Age: 84
Setting detail: HOSPITAL OUTPATIENT SURGERY
Discharge: HOME | End: 2024-09-23
Attending: STUDENT IN AN ORGANIZED HEALTH CARE EDUCATION/TRAINING PROGRAM | Admitting: STUDENT IN AN ORGANIZED HEALTH CARE EDUCATION/TRAINING PROGRAM
Payer: MEDICARE

## 2024-09-23 VITALS
WEIGHT: 135 LBS | HEIGHT: 61 IN | OXYGEN SATURATION: 99 % | HEART RATE: 64 BPM | DIASTOLIC BLOOD PRESSURE: 55 MMHG | RESPIRATION RATE: 18 BRPM | BODY MASS INDEX: 25.49 KG/M2 | SYSTOLIC BLOOD PRESSURE: 113 MMHG | TEMPERATURE: 98 F

## 2024-09-23 DIAGNOSIS — I85.00 ESOPHAGEAL VARICES WITHOUT BLEEDING, UNSPECIFIED ESOPHAGEAL VARICES TYPE (HCC): ICD-10-CM

## 2024-09-23 PROCEDURE — 88305 TISSUE EXAM BY PATHOLOGIST: CPT | Performed by: STUDENT IN AN ORGANIZED HEALTH CARE EDUCATION/TRAINING PROGRAM

## 2024-09-23 PROCEDURE — 0DB68ZX EXCISION OF STOMACH, VIA NATURAL OR ARTIFICIAL OPENING ENDOSCOPIC, DIAGNOSTIC: ICD-10-PCS | Performed by: STUDENT IN AN ORGANIZED HEALTH CARE EDUCATION/TRAINING PROGRAM

## 2024-09-23 RX ORDER — NALOXONE HYDROCHLORIDE 0.4 MG/ML
0.08 INJECTION, SOLUTION INTRAMUSCULAR; INTRAVENOUS; SUBCUTANEOUS ONCE AS NEEDED
Status: DISCONTINUED | OUTPATIENT
Start: 2024-09-23 | End: 2024-09-23

## 2024-09-23 RX ORDER — SODIUM CHLORIDE, SODIUM LACTATE, POTASSIUM CHLORIDE, CALCIUM CHLORIDE 600; 310; 30; 20 MG/100ML; MG/100ML; MG/100ML; MG/100ML
INJECTION, SOLUTION INTRAVENOUS CONTINUOUS
Status: DISCONTINUED | OUTPATIENT
Start: 2024-09-23 | End: 2024-09-23

## 2024-09-23 RX ORDER — LIDOCAINE HYDROCHLORIDE 10 MG/ML
INJECTION, SOLUTION EPIDURAL; INFILTRATION; INTRACAUDAL; PERINEURAL AS NEEDED
Status: DISCONTINUED | OUTPATIENT
Start: 2024-09-23 | End: 2024-09-23 | Stop reason: SURG

## 2024-09-23 RX ADMIN — LIDOCAINE HYDROCHLORIDE 50 MG: 10 INJECTION, SOLUTION EPIDURAL; INFILTRATION; INTRACAUDAL; PERINEURAL at 09:25:00

## 2024-09-23 NOTE — H&P
[facility] History and Physical Procedure Note    CC: EGD    History of Present Illness: Carlos Howell is a 84 year old male who presents for a  EGD.    Indication:   Esophageal varices, surveillance      No First Degree Relative with a history of Colorectal Cancer         No overt Blood in stool   No Constipation   No Diarrhea   No Abdominal pain     No Reflux symptoms   No Dysphagia       Medications reviewed as below:   No non-aspirin antithrombotic agents    Medications:  No current outpatient medications on file.    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    Rheumatoid arthritis (HCC)    Visual impairment       Past Surgical History:  Past Surgical History:   Procedure Laterality Date    Appendectomy      Bypass surgery      Cabg      x4 vessels    Repair ing hernia,5+y/o,reducibl         Family History:  Family History   Problem Relation Age of Onset    Other (Other) Father        Social History:  Social History     Socioeconomic History    Marital status:    Tobacco Use    Smoking status: Former     Current packs/day: 0.00     Average packs/day: 1 pack/day for 50.0 years (50.0 ttl pk-yrs)     Types: Cigarettes     Start date: 1956     Quit date: 3/26/2005     Years since quittin.5     Passive exposure: Past    Smokeless tobacco: Never    Tobacco comments:     quit smoking about 6 yrs ago   Vaping Use    Vaping status: Never Used   Substance and Sexual Activity    Alcohol use: Not Currently    Drug use: Never   Other  Topics Concern    Caffeine Concern No    Exercise Yes    Seat Belt Yes    Special Diet No    Stress Concern No    Weight Concern No     Social Determinants of Health     Food Insecurity: No Food Insecurity (3/25/2024)    Food Insecurity     Food Insecurity: Never true   Transportation Needs: No Transportation Needs (3/25/2024)    Transportation Needs     Lack of Transportation: No   Housing Stability: Low Risk  (3/25/2024)    Housing Stability     Housing Instability: No       Review of Systems  Negative unless otherwise mentioned in HPI.     Allergies:  Allergies   Allergen Reactions    Azithromycin RASH     Face flushing         Objective:    Physical Exam  BP 97/73 (BP Location: Left arm)   Pulse 67   Temp 98 °F (36.7 °C) (Temporal)   Resp 16   Ht 5' 1\" (1.549 m)   Wt 135 lb (61.2 kg)   SpO2 95%   BMI 25.51 kg/m²   Body mass index is 25.51 kg/m².    Gen: Awake and alert, NAD  CV: Ext warm b/l  Resp: no resp distress  Neuro: NAD, Aox3.     Pertinent labs:   Lab Results   Component Value Date     07/24/2024    K 4.3 07/24/2024     07/24/2024    CO2 26.0 07/24/2024    ANIONGAP 4 07/24/2024    BUN 20 07/24/2024    GLUCOSE 94 04/01/2009     Lab Results   Component Value Date    WBC 4.8 07/24/2024    HGB 13.6 07/24/2024    HCT 42.7 07/24/2024    MCV 85.1 07/24/2024    .0 (L) 07/24/2024     Lab Results   Component Value Date    AST 42 (H) 07/24/2024    ALT 29 07/24/2024    CRP 0.99 (H) 11/08/2020    ALB 2.6 (L) 07/24/2024     Lab Results   Component Value Date    INR 1.18 03/28/2024       Imaging:  MRI ABDOMEN (W+WO) (CPT=74183)  Narrative: PROCEDURE:  MRI ABDOMEN (W+WO) (CPT=74183)     COMPARISON:  [facility], CT, CT CHEST HI RESOLUTION (CPT=71250), 7/23/2024, 7:17 AM.     INDICATIONS:  K74.60 Cirrhosis of liver without ascites, unspecified hepatic cirrhosis type (HCC)     TECHNIQUE:  Multiplanar T1 and T2 weighted images are acquired including fat suppressed T2 weighted fast spin echo, in and  out of phase gradient echo, FIESTA, and single shot fast spin echo T2 weighted sequences.   Post infusion 3D T1 weighted gradient   echo (LAVA) sequences were acquired after contrast were infused.  Appropriate delayed images were acquired after infusion.     PATIENT STATED HISTORY:(As transcribed by Technologist)  Patient states liver disease.      CONTRAST USED:  13 mL of Dotarem     FINDINGS:    LIVER:  The liver appears small with relative enlargement of the caudate lobe suggesting hepatic cirrhosis.  No focal hepatic mass lesions or abnormal enhancement of the liver noted.    BILIARY:  Patient is status post cholecystectomy.  Mild intrahepatic and extrahepatic biliary prominence due to reservoir effect.  No linda biliary ductal dilatation or filling defects along the CBD suggested.  PANCREAS:  There is diffuse pancreatic atrophy.  There is a septated cystic focus along the uncinate process measuring 1.0 x 0.9 x 1.4 cm.  There is no evidence of enhancement of this cystic focus.  The remainder of the pancreas reveals no evidence of   enhancement.  The main pancreatic duct is of normal caliber.  No evidence of pancreatitis.  SPLEEN:  Borderline enlargement of the spleen measuring 12.8 cm in craniocaudal dimension.  No focal splenic lesions.  KIDNEYS:  No significant renal lesions.  There is a simple nonenhancing cyst extending exophytically from the right inferior pole measuring 1.5 cm.  This is consistent with a simple benign cyst and no further workup is required or recommended.  Multiple   smaller benign nonenhancing cysts are also identified within both kidneys, with the largest of these smaller cysts seen within the left superior pole measuring 0.5 cm.   ADRENALS:  No mass or enlargement.    OTHER:  Fibrotic changes and/or scarring within the periphery of the lungs noted, best appreciated on recent CT scanning of the chest.                   Impression: CONCLUSION:    1. There is relative enlargement of the  caudate lobe with the overall size of the liver appearing small with a slightly nodular contour suggestive of hepatic cirrhosis.  No focal hepatic mass lesions or abnormal enhancement noted.  2. There is a nonenhancing thinly septated cystic focus within the uncinate process of the pancreas measuring up to 1.4 cm.  The possibility of IPMN cannot be excluded.  If clinically indicated this may be follow-up based on ACR white paper guidelines.     Based on 2017 American College of Radiology white paper guidelines, regarding incidental pancreatic cysts, either surveillance imaging with MRI Q6 months x4 then Q 1 year x2 then Q 2 years x3 or endoscopic ultrasound with FNA could be considered   depending on other clinical factors and risk tolerance (these recommendations are based on patient's 79 years of age or younger).  Please note that American college of radiology recommends only 2 MRI follow-ups Q 2 years for patient's in which the cyst   is discovered over the age of 80.             LOCATION:  [place]        Dictated by (CST): Roxana Pedersen DO on 8/27/2024 at 4:39 PM       Finalized by (CST): Roxana Pedersen DO on 8/27/2024 at 4:51 PM          Informed consent  Informed Consent:   The planned procedure(s), the explanation of the procedure, indications, its expected benefits, the potential complications and risks and possible alternatives and their benefits and risks were discussed with the patient. The discussion of risks, not limited to but including bleeding, infection, perforation / tear in the gastrointestinal tract, adverse effects from anesthesia, and possible prolonged hospitalization, emergency surgery, risk of morbidity or mortality, and risk of missed lesion(s) were discussed with patient.   Patient understood the proposed procedure(s), and informed consented to the procedure and elected to proceed with the procedure(s) with possible intervention (such as polypectomy, biopsy, control of bleeding, etc.) and  its risks, benefits and alternatives and wish to proceed with procedure(s). All questions answered in full to patient's  satisfaction in full.       Impression/Recommendations:  Carlos Howell is a 84 year old male who presents for a EGD +/- endotherapy.   Plan to proceed with EGD with anesthesia.     ASA class per anesthesia.   Informed consent obtained as detailed above.       DANIEL SCHUSTER M.D.  [facility]

## 2024-09-23 NOTE — TELEPHONE ENCOUNTER
From: Carlos Howell  To: Simba Nunez  Sent: 9/22/2024 7:35 PM CDT  Subject: MRI of Abdomen    Good Evening,    My apologies as it's been some time since I've been able to pull up Dad's mychart. Regarding his MRI of the Abdomen, Dr. Huffman has advised that he wasn't concerned about the Pancreas at this time and he will repeat labs and another MRI of the abdomen in February of 2025 to follow-up.    If Dr. Nunez has any additional questions or would like to speak with me directly, please feel free to have him reach out to me at [phone].    Thanks,    Mona Howell

## 2024-09-23 NOTE — TELEPHONE ENCOUNTER
Call placed to daughter to advise her that message will be forwarded to Dr. Botello and we will get back to her.  Pt does not want to start the Rituximab.  Pt wants to start medication to slow the progression of the fibrosis.

## 2024-09-23 NOTE — DISCHARGE INSTRUCTIONS
Home Care Instructions for  Gastroscopy with Sedation    Diet:  - Resume your regular diet as tolerated unless otherwise instructed.  - Start with light meals to minimize bloating.  - Do not drink alcohol today.    Medication:  - If you have questions about resuming your normal medications, please contact your Primary Care Physician.    Activities:  - Take it easy today. Do not return to work today.  - Do not drive today.  - Do not operate any machinery today (including kitchen equipment).      Gastroscopy:  - You may have a sore throat for 2-3 days following the exam. This is normal. Gargling with warm salt water (1/2 tsp salt to 1 glass warm water) or using throat lozenges will help.  - If you experience any sharp pain in your neck, abdomen or chest, vomiting of blood, oral temperature over 100 degrees Fahrenheit, light-headedness or dizziness, or any other problems, contact your doctor.    **If unable to reach your doctor, please go to the [facility] Emergency Room**    - Your referring physician will receive a full report of your examination.  - If you do not hear from your doctor's office within two weeks of your biopsy, please call them for your results.    You may be able to see your laboratory results in QA on Request between 4 and 7 business days.  In some cases, your physician may not have viewed the results before they are released to QA on Request.  If you have questions regarding your results contact the physician who ordered the test/exam by phone or via QA on Request by choosing \"Ask a Medical Question.\"

## 2024-09-23 NOTE — OPERATIVE REPORT
EGD Operative Report  Patient Name: Carlos Howell  YOB: 1940  MRN: [id number]  Procedure: Esophagogastroduodenoscopy (EGD)  with biopsies   Pre-operative Diagnosis & Indication:   Esophageal variceal surveillance   Cirrhosis   Post-operative Diagnosis:  Gastritis, s/p biopsies   Hiatal hernia  Attending Endoscopist: Lion Aguilera M.D.  Informed Consent: The planned procedure(s), the explanation of the procedure, its expected benefits, the potential complications and risks and possible alternatives and their benefits and risks were discussed with the patient or the patient's surrogate. The discussion of risks, not limited to but including bleeding, infection, perforation, adverse effects from anesthesia, need for emergency surgery/prolonged hospitalization,  cardiac arrhythmias,  and aspiration were discussed with patient.  Pt and/or surrogate understood the proposed procedure(s), its risks, benefits and alternatives and wish to proceed with procedure(s). All questions answered in full.  After all questions were answered to their satisfaction, a signed, informed, and witnessed consent was obtained.  Physical Exam: Heart: regular rate and rhythm. No rubs, murmurs, or gallops. Lungs: Clear to auscultation bilaterally. Abdomen: Soft, non-tender, non distended. No rebound tenderness, no guarding.   A TIME OUT WAS COMPLETED prior to the procedure to confirm the patient, procedure(s) and complete endoscopy safety procedure.  Sedation: Monitored Anesthesia Care; ASA class per anesthesiology team   Monitoring: Pulsoximetry, pulse, respirations, and blood pressure , vitals were monitored throughout the entire procedure under monitored anesthesia care.   Procedure: The patient was then brought to the endoscopy suite where his/her pulse, pulse oximetry and blood pressure were monitored. The patient was placed in the left lateral decubitus position and deep sedation was administered. Once adequate sedation  was achieved, a bite block was placed and a lubricated tip of an Olympus video upper endoscope was inserted through the oropharynx and gently manipulated through the esophagus into the stomach and the second portion of the duodenum. Upon withdrawal of the endoscope, careful visualization of the mucosa was performed. The endoscope was then withdrawn into the gastric antrum and placed in a retroflexed position.  The endoscope was then righted, and air was suctioned from the stomach.  The endoscope was then withdrawn from the patient, with careful visual inspection of the mucosa. The patient left the procedure room in stable condition for recovery. Findings and endotherapy as listed below  Toleration: Patient tolerated procedure well   Complications: No immediate complications   Technical Difficulty:  The procedure was not technically difficult   Estimated Blood Loss: Minimal, less than 5mL of estimated blood loss.   Findings and Therapeutics:  Esophagus:   The mucosa was normal. No esophageal varices.   There were no strictures or stenosis. GEJ junction traversed with endoscope without resistance.  The Z-line was irregular, appreciated at 33 cm from the incisors.  Diaphragmatic impression noted at 38cm from the incisors. 5cm hiatal hernia.   Stomach:    Mild gastric erythema in the body of the stomach. Biopsies with cold forceps were obtained, rule out H pylori vs. PHG.   Endoscope was placed in a retroflexion view in the stomach. There was  evidence of a hiatal hernia. No gastric varices.   Duodenum:   The entire examined duodenum was normal.    Recommendations:  Post EGD precautions, watch for bleeding, infection, perforation, adverse drug reactions   Follow-up biopsies  Repeat EGD in 1 year for surveillance  F/u in GI OV in 6 months     Lion Aguilera MD  9/23/2024  9:35 AM

## 2024-09-23 NOTE — ANESTHESIA POSTPROCEDURE EVALUATION
[facility]    Carlos Howell Patient Status:  Hospital Outpatient Surgery   Age/Gender 84 year old male MRN [id number]   Location [facility] Attending Lion Aguilera MD   Hosp Day # 0 PCP Simba Nunez MD       Anesthesia Post-op Note    ESOPHAGOGASTRODUODENOSCOPY (EGD) WITH BIOPSIES    Procedure Summary       Date: 09/23/24 Room / Location:  ENDOSCOPY 02 /  ENDOSCOPY    Anesthesia Start: 0924 Anesthesia Stop:     Procedure: ESOPHAGOGASTRODUODENOSCOPY (EGD) WITH BIOPSIES Diagnosis:       Esophageal varices without bleeding, unspecified esophageal varices type (HCC)      (HIATAL HERNIA, GASTRITIS)    Surgeons: Lion Aguilera MD Anesthesiologist: Sammy Rivas MD    Anesthesia Type: MAC ASA Status: 3            Anesthesia Type: MAC    Vitals Value Taken Time   BP 98/56 09/23/24 0937   Temp  09/23/24 0938   Pulse 66 09/23/24 0937   Resp 18 09/23/24 0937   SpO2 97 % 09/23/24 0937       Patient Location: Endoscopy    Anesthesia Type: MAC    Airway Patency: patent    Postop Pain Control: adequate    Mental Status: mildly sedated but able to meaningfully participate in the post-anesthesia evaluation    Nausea/Vomiting: none    Cardiopulmonary/Hydration status: stable euvolemic    Complications: no apparent anesthesia related complications    Postop vital signs: stable    Dental Exam: Unchanged from Preop    Patient to be discharged home when criteria met.

## 2024-09-23 NOTE — ANESTHESIA PREPROCEDURE EVALUATION
PRE-OP EVALUATION    Patient Name: Carlos Howell    Admit Diagnosis: Esophageal varices without bleeding, unspecified esophageal varices type (HCC) [I85.00]    Pre-op Diagnosis: Esophageal varices without bleeding, unspecified esophageal varices type (HCC) [I85.00]    ESOPHAGOGASTRODUODENOSCOPY (EGD)    Anesthesia Procedure: ESOPHAGOGASTRODUODENOSCOPY (EGD)    Surgeons and Role:     * Lion Aguilera MD - Primary    Pre-op vitals reviewed.        Body mass index is 25.51 kg/m².    Current medications reviewed.  Hospital Medications:  No current facility-administered medications on file as of 9/23/2024.       Outpatient Medications:     Facility-Administered Medications Prior to Admission   Medication Dose Route Frequency Provider Last Rate Last Admin    [COMPLETED] methylPREDNISolone acetate (DEPO-medrol) 80 MG/ML injection 80 mg  80 mg Intramuscular Once Heidi Valles MD   80 mg at 07/26/24 1529     Medications Prior to Admission   Medication Sig Dispense Refill Last Dose    fluticasone furoate-vilanterol (BREO ELLIPTA) 100-25 MCG/ACT Inhalation Aerosol Powder, Breath Activated Inhale 1 puff into the lungs daily. 1 each 5     carvedilol 3.125 MG Oral Tab Take 1 tablet (3.125 mg total) by mouth 2 (two) times daily.       hydroxychloroquine 200 MG Oral Tab Take 2 tablets (400 mg total) by mouth daily. 180 tablet 3     rosuvastatin 5 MG Oral Tab Take 1 tablet (5 mg total) by mouth nightly. 90 tablet 1     albuterol 108 (90 Base) MCG/ACT Inhalation Aero Soln Inhale 2 puffs into the lungs every 6 (six) hours as needed for Wheezing. 1 each 0     Cholecalciferol (VITAMIN D) 50 MCG (2000 UT) Oral Tab Take 2,000 Units by mouth daily.          Allergies: Azithromycin      Anesthesia Evaluation    Patient summary reviewed.    Anesthetic Complications  (+) history of anesthetic complications         GI/Hepatic/Renal  Comment: Esophageal varices    (+) GERD          (+) liver disease                  Cardiovascular      ECG reviewed.            (+) hypertension   (+) hyperlipidemia  (+) CAD                                Endo/Other                           (+) arthritis       Pulmonary                           Neuro/Psych      (+) depression                                Past Surgical History:   Procedure Laterality Date    Appendectomy      Bypass surgery      Cabg      x4 vessels    Repair ing hernia,6+y/o,reducibl       Social History     Socioeconomic History    Marital status:    Tobacco Use    Smoking status: Former     Current packs/day: 0.00     Average packs/day: 1 pack/day for 50.0 years (50.0 ttl pk-yrs)     Types: Cigarettes     Start date: 1956     Quit date: 3/26/2005     Years since quittin.5     Passive exposure: Past    Smokeless tobacco: Never    Tobacco comments:     quit smoking about 6 yrs ago   Vaping Use    Vaping status: Never Used   Substance and Sexual Activity    Alcohol use: Not Currently    Drug use: Never   Other Topics Concern    Caffeine Concern No    Exercise Yes    Seat Belt Yes    Special Diet No    Stress Concern No    Weight Concern No     History   Drug Use Unknown     Available pre-op labs reviewed.  Lab Results   Component Value Date    WBC 4.8 2024    RBC 5.02 2024    HGB 13.6 2024    HCT 42.7 2024    MCV 85.1 2024    MCH 27.1 2024    MCHC 31.9 2024    RDW 16.1 2024    .0 (L) 2024     Lab Results   Component Value Date     2024    K 4.3 2024     2024    CO2 26.0 2024    BUN 20 2024    CREATSERUM 0.79 2024     (H) 2024    CA 9.0 2024            Airway      Mallampati: II  Mouth opening: >3 FB  TM distance: > 6 cm  Neck ROM: full Cardiovascular    Cardiovascular exam normal.  Rhythm: regular  Rate: normal     Dental    Dentition appears grossly intact         Pulmonary    Pulmonary exam normal.  Breath sounds clear to  auscultation bilaterally.               Other findings              ASA: 3   Plan: MAC  NPO status verified and patient meets guidelines.    Post-procedure pain management plan discussed with surgeon and patient.      Plan/risks discussed with: patient  Use of blood product(s) discussed with: patient              Present on Admission:  **None**

## 2024-09-23 NOTE — TELEPHONE ENCOUNTER
From: Carlos Howell  To: Marion Botello  Sent: 9/22/2024 7:48 PM CDT  Subject: PFT - Lung Fibrosis    Good Evening Dr. Botello,    I did receive your voicemail a bit ago, and I apologize as I'm just getting around to sending you a message. 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 You had mentioned that depending on the results, there would be a medication we can put him on in order to prolong the fibrosis from getting worse. Please let me know your thoughts and if there is a medication you can place him on, what would that be and would you be able to send in a prescription since you just recent saw him in your office, pending the PFT that he has now completed.    Thanks,  Mona Howell  [phone]

## 2024-09-30 NOTE — PROGRESS NOTES
Mu Gonzalez,    You recently had an upper endoscopy (EGD) procedure completed with biopsies of the stomach.     The biopsies of the stomach are NORMAL.     Please call the office if you have any questions or concerns about these results.     Sincerely,    Lion Aguilera MD  [facility]  [phone]

## 2024-10-02 ENCOUNTER — OFFICE VISIT (OUTPATIENT)
Dept: FAMILY MEDICINE CLINIC | Facility: CLINIC | Age: 84
End: 2024-10-02
Payer: MEDICARE

## 2024-10-02 VITALS
HEIGHT: 61 IN | OXYGEN SATURATION: 98 % | RESPIRATION RATE: 16 BRPM | WEIGHT: 133 LBS | SYSTOLIC BLOOD PRESSURE: 98 MMHG | HEART RATE: 68 BPM | DIASTOLIC BLOOD PRESSURE: 62 MMHG | BODY MASS INDEX: 25.11 KG/M2

## 2024-10-02 DIAGNOSIS — K74.60 CIRRHOSIS OF LIVER WITHOUT ASCITES, UNSPECIFIED HEPATIC CIRRHOSIS TYPE (HCC): ICD-10-CM

## 2024-10-02 DIAGNOSIS — I10 ESSENTIAL HYPERTENSION: ICD-10-CM

## 2024-10-02 DIAGNOSIS — I25.10 CORONARY ARTERY DISEASE INVOLVING NATIVE CORONARY ARTERY OF NATIVE HEART WITHOUT ANGINA PECTORIS: ICD-10-CM

## 2024-10-02 DIAGNOSIS — M06.9 RHEUMATOID ARTHRITIS INVOLVING MULTIPLE SITES, UNSPECIFIED WHETHER RHEUMATOID FACTOR PRESENT (HCC): ICD-10-CM

## 2024-10-02 DIAGNOSIS — J84.9 INTERSTITIAL LUNG DISEASE (HCC): ICD-10-CM

## 2024-10-02 DIAGNOSIS — E78.2 MIXED HYPERLIPIDEMIA: ICD-10-CM

## 2024-10-02 DIAGNOSIS — M80.00XA AGE-RELATED OSTEOPOROSIS WITH CURRENT PATHOLOGICAL FRACTURE, INITIAL ENCOUNTER: ICD-10-CM

## 2024-10-02 DIAGNOSIS — Z00.00 ENCOUNTER FOR ANNUAL HEALTH EXAMINATION: Primary | ICD-10-CM

## 2024-10-02 DIAGNOSIS — M54.50 ACUTE RIGHT-SIDED LOW BACK PAIN WITHOUT SCIATICA: ICD-10-CM

## 2024-10-02 DIAGNOSIS — C44.92 SQUAMOUS CELL SKIN CANCER: ICD-10-CM

## 2024-10-02 PROCEDURE — G0439 PPPS, SUBSEQ VISIT: HCPCS | Performed by: FAMILY MEDICINE

## 2024-10-02 RX ORDER — HYDROXYCHLOROQUINE SULFATE 200 MG/1
400 TABLET, FILM COATED ORAL DAILY
Qty: 180 TABLET | Refills: 3 | Status: SHIPPED | OUTPATIENT
Start: 2024-10-02

## 2024-10-02 RX ORDER — CHOLECALCIFEROL (VITAMIN D3) 50 MCG
2000 TABLET ORAL DAILY
Qty: 90 TABLET | Refills: 3 | Status: SHIPPED | OUTPATIENT
Start: 2024-10-02

## 2024-10-02 RX ORDER — ALBUTEROL SULFATE 90 UG/1
2 INHALANT RESPIRATORY (INHALATION) EVERY 6 HOURS PRN
Qty: 3 EACH | Refills: 3 | Status: SHIPPED | OUTPATIENT
Start: 2024-10-02

## 2024-10-02 RX ORDER — CARVEDILOL 3.12 MG/1
3.12 TABLET ORAL 2 TIMES DAILY
Qty: 180 TABLET | Refills: 3 | Status: SHIPPED | OUTPATIENT
Start: 2024-10-02

## 2024-10-02 RX ORDER — ROSUVASTATIN CALCIUM 5 MG/1
5 TABLET, COATED ORAL NIGHTLY
Qty: 90 TABLET | Refills: 3 | Status: SHIPPED | OUTPATIENT
Start: 2024-10-02

## 2024-10-02 RX ORDER — FLUTICASONE FUROATE AND VILANTEROL 100; 25 UG/1; UG/1
1 POWDER RESPIRATORY (INHALATION) DAILY
Qty: 3 EACH | Refills: 3 | Status: SHIPPED | OUTPATIENT
Start: 2024-10-02

## 2024-10-02 NOTE — PROGRESS NOTES
Subjective:   Carlos Howell is a 84 year old male who presents for a Medicare Subsequent Annual Wellness visit (Pt already had Initial Annual Wellness) and scheduled follow up of multiple significant but stable problems.       History/Other:   Fall Risk Assessment:   He has been screened for Falls and is High Risk. Fall Prevention information provided to patient in After Visit Summary.    Do you feel unsteady when standing or walking?: Yes  Do you worry about falling?: Yes  Have you fallen in the past year?: No     Cognitive Assessment:   He had a completely normal cognitive assessment - see flowsheet entries       Functional Ability/Status:   Carlos Howell has some abnormal functions as listed below:  He has Dressing and/or Bathing issues based on screening of functional status.  Difficulty dressing or bathing?: Yes  Bathing or Showering: Cannot do without help  Dressing: Need some help  He has Toileting difficulties based on screening of functional status.He has Eating difficulties based on screening of functional status.He has Driving difficulties based on screening of functional status. He has Meal Preparation difficulties based on screening of functional status.He has difficulties Managing Money/Bills based on screening of functional status.He has difficulties Shopping for Groceries based on screening of functional status. He has difficulties Taking Meds as Rx'd based on screening of functional status. He has Hearing problems based on screening of functional status.He has Walking problems based on screening of functional status. He has problems with Daily Activities based on screening of functional status.       Depression Screening (PHQ):  PHQ-2 SCORE: 0  , done 10/2/2024     Advanced Directives:   He does NOT have a Living Will. [Do you have a living will?: No]  He does NOT have a Power of  for Health Care. [Do you have a healthcare power of ?: No]  Discussed Advance Care  Planning with patient (and family/surrogate if present). Standard forms made available to patient in After Visit Summary.      Patient Active Problem List   Diagnosis    Rheumatoid arthritis (HCC)    Gastroesophageal reflux disease with esophagitis without hemorrhage    Coronary artery disease involving native coronary artery of native heart without angina pectoris    Essential hypertension    Falling episodes    Pneumonia of both lungs due to infectious organism    Interstitial lung disease (HCC)    Weakness generalized    Esophageal varices without bleeding (HCC)    Bilateral primary osteoarthritis of knee    Bilateral carpal tunnel syndrome    Senile osteoporosis    Rheumatoid lung (HCC)     Allergies:  He is allergic to azithromycin.    Current Medications:  Outpatient Medications Marked as Taking for the 10/2/24 encounter (Office Visit) with Simba Nunez MD   Medication Sig    hydroxychloroquine 200 MG Oral Tab Take 2 tablets (400 mg total) by mouth daily.    carvedilol 3.125 MG Oral Tab Take 1 tablet (3.125 mg total) by mouth 2 (two) times daily.    Cholecalciferol (VITAMIN D) 50 MCG (2000 UT) Oral Tab Take 2,000 Units by mouth daily.    fluticasone furoate-vilanterol (BREO ELLIPTA) 100-25 MCG/ACT Inhalation Aerosol Powder, Breath Activated Inhale 1 puff into the lungs daily.    rosuvastatin 5 MG Oral Tab Take 1 tablet (5 mg total) by mouth nightly.    albuterol 108 (90 Base) MCG/ACT Inhalation Aero Soln Inhale 2 puffs into the lungs every 6 (six) hours as needed for Wheezing.       Medical History:  He  has a past medical history of Anesthesia complication, Arthritis, CAD (coronary artery disease), Cancer (HCC), Current episode of major depressive disorder without prior episode (02/19/2021), Disorder of liver, Esophageal reflux, Hearing impairment, High blood pressure, High cholesterol, History of 2019 novel coronavirus disease (COVID-19) (12/15/2020), HTN (hypertension), Osteoarthritis, Rheumatoid  arthritis (HCC), and Visual impairment.  Surgical History:  He  has a past surgical history that includes bypass surgery; appendectomy; repair ing hernia,6+y/o,reducibl; and cabg.   Family History:  His family history includes Other in his father.  Social History:  He  reports that he quit smoking about 19 years ago. His smoking use included cigarettes. He started smoking about 68 years ago. He has a 50 pack-year smoking history. He has been exposed to tobacco smoke. He has never used smokeless tobacco. He reports that he does not currently use alcohol. He reports that he does not use drugs.    Tobacco:  He smoked tobacco in the past but quit greater than 12 months ago.  Social History     Tobacco Use   Smoking Status Former    Current packs/day: 0.00    Average packs/day: 1 pack/day for 50.0 years (50.0 ttl pk-yrs)    Types: Cigarettes    Start date: 1956    Quit date: 3/26/2005    Years since quittin.5    Passive exposure: Past   Smokeless Tobacco Never   Tobacco Comments    quit smoking about 6 yrs ago          CAGE Alcohol Screen:   CAGE screening score of 0 on 10/2/2024, showing low risk of alcohol abuse.      Patient Care Team:  Simba Nunez MD as PCP - General (Family Medicine)  Marion Botello MD (PULMONARY DISEASES)    Review of Systems  Constitutional: negative  Eyes: negative  Ears, nose, mouth, throat, and face: negative  Respiratory: positive for cough  Cardiovascular: negative  Gastrointestinal: negative  Musculoskeletal:positive for arthralgias and back pain  Neurological: negative  Behavioral/Psych: negative    Objective:   Physical Exam  Vitals and nursing note reviewed.   Constitutional:       Appearance: Normal appearance.   HENT:      Head: Normocephalic and atraumatic.   Eyes:      Pupils: Pupils are equal, round, and reactive to light.   Cardiovascular:      Rate and Rhythm: Normal rate and regular rhythm.      Pulses: Normal pulses.      Heart sounds: Normal heart sounds.  No murmur heard.  Pulmonary:      Effort: Pulmonary effort is normal. No respiratory distress.      Breath sounds: Normal breath sounds. No wheezing or rhonchi.   Abdominal:      General: Abdomen is flat. Bowel sounds are normal. There is no distension.      Palpations: Abdomen is soft. There is no mass.      Tenderness: There is no abdominal tenderness.      Hernia: No hernia is present.   Musculoskeletal:      Right lower leg: No edema.      Left lower leg: No edema.   Skin:     Findings: No rash.   Neurological:      General: No focal deficit present.      Mental Status: He is alert and oriented to person, place, and time.           BP 98/62   Pulse 68   Resp 16   Ht 5' 1\" (1.549 m)   Wt 133 lb (60.3 kg)   SpO2 98%   BMI 25.13 kg/m²  Estimated body mass index is 25.13 kg/m² as calculated from the following:    Height as of this encounter: 5' 1\" (1.549 m).    Weight as of this encounter: 133 lb (60.3 kg).    Medicare Hearing Assessment:   Hearing Screening    Screening Method: Whisper Test  Whisper Test Result: Pass         Visual Acuity:   Right Eye Visual Acuity: Uncorrected Right Eye Chart Acuity: 20/25   Left Eye Visual Acuity: Uncorrected Left Eye Chart Acuity: 20/25   Both Eyes Visual Acuity: Uncorrected Both Eyes Chart Acuity: 20/25   Able To Tolerate Visual Acuity: Yes        Assessment & Plan:   Carlos Howell is a 84 year old male who presents for a Medicare Assessment.     1. Encounter for annual health examination (Primary)  -Immunizations: Recommend shingrex   -Metabolic: BMI 25. BP wnl. Due for annual labs   -Cancer screening: UTD  -Communicable disease: low risk   -Mental health: no concerns   -Other preventative: follow with dentistry and optometry.   -Lifestyle: Follow a well balanced healthy diet with emphasis on fruits, vegetables, whole grains, lean meats. Limit processed and junk foods. Aim for at least 150 minutes of moderate intensity exercise weekly. Make sure you are staying  adequately hydrated. Aim to get 7-9 hours of sleep nightly.     2. Rheumatoid arthritis involving multiple sites, unspecified whether rheumatoid factor present (HCC)  Following w/ rheumatology  Needs new DME order for wheelchair.   -     Hydroxychloroquine Sulfate; Take 2 tablets (400 mg total) by mouth daily.  Dispense: 180 tablet; Refill: 3  -     Wheelchair - Lightweight    3. Interstitial lung disease (HCC)  Following with pulmonology. Plans for possible rituxan.   -     Fluticasone Furoate-Vilanterol; Inhale 1 puff into the lungs daily.  Dispense: 3 each; Refill: 3  -     Albuterol Sulfate HFA; Inhale 2 puffs into the lungs every 6 (six) hours as needed for Wheezing.  Dispense: 3 each; Refill: 3    4. 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, initial encounter  Stable, CPM  -     Vitamin D; Take 2,000 Units by mouth daily.  Dispense: 90 tablet; Refill: 3    5. Mixed hyperlipidemia  Stable, CPM.   -     Rosuvastatin Calcium; Take 1 tablet (5 mg total) by mouth nightly.  Dispense: 90 tablet; Refill: 3    6. Coronary artery disease involving native coronary artery of native heart without angina pectoris  Stable, no anginal sx. CPM.   -     Carvedilol; Take 1 tablet (3.125 mg total) by mouth 2 (two) times daily.  Dispense: 180 tablet; Refill: 3  7. Essential hypertension  Stable ,CPM    8. Cirrhosis of liver without ascites, unspecified hepatic cirrhosis type (HCC)  Following with hepatology. Plans on repeat MRI of abdomen in 02/2025    9. Squamous cell skin cancer  Following with dermatology    10. Acute right-sided low back pain without sciatica  Has been experiencing R lower back pain w/o radicular sx. No trauma or injury. Will refer for home PT. Continue supportive care measures. No red flag sx. F/u 1mo PRN if no improvement or worsening of sx.  -     RESIDENTIAL HOME HEALTH REFERRAL    The patient indicates understanding of these issues and agrees to the plan.  Reinforced healthy diet,  lifestyle, and exercise.      Return in about 6 months (around 4/2/2025).     Simba Nunez MD, 10/2/2024     Supplementary Documentation:   General Health:  In the past six months, have you lost more than 10 pounds without trying?: 1 - Yes  Has your appetite been poor?: Yes  Type of Diet: Balanced;Other  How does the patient maintain a good energy level?: Other (NONE)  How would you describe your daily physical activity?: None  How would you describe your current health state?: Fair  How do you maintain positive mental well-being?: Social Interaction  On a scale of 0 to 10, with 0 being no pain and 10 being severe pain, what is your pain level?: 8 - (Severe)  In the past six months, have you experienced urine leakage?: 0-No  At any time do you feel concerned for the safety/well-being of yourself and/or your children, in your home or elsewhere?: No  Have you had any immunizations at another office such as Influenza, Hepatitis B, Tetanus, or Pneumococcal?: No    Health Maintenance   Topic Date Due    Zoster Vaccines (1 of 2) Never done    Annual Physical  04/03/2024    COVID-19 Vaccine (4 - 2023-24 season) 09/01/2024    Influenza Vaccine (1) 10/01/2024    Annual Depression Screening  Completed    Fall Risk Screening (Annual)  Completed    Pneumococcal Vaccine: 65+ Years  Completed

## 2024-10-29 ENCOUNTER — HOME HEALTH CHARGES (OUTPATIENT)
Dept: FAMILY MEDICINE CLINIC | Facility: CLINIC | Age: 84
End: 2024-10-29

## 2024-10-29 ENCOUNTER — MED REC SCAN ONLY (OUTPATIENT)
Dept: FAMILY MEDICINE CLINIC | Facility: CLINIC | Age: 84
End: 2024-10-29

## 2024-10-29 DIAGNOSIS — M06.89 OTHER SPECIFIED RHEUMATOID ARTHRITIS, MULTIPLE SITES (HCC): Primary | ICD-10-CM

## 2024-11-15 ENCOUNTER — TELEPHONE (OUTPATIENT)
Dept: FAMILY MEDICINE CLINIC | Facility: CLINIC | Age: 84
End: 2024-11-15

## 2024-11-15 NOTE — TELEPHONE ENCOUNTER
Justin from [facility] called, they need the ofc visit notes from 10/02 signed by Dr seth for insurance. Fax number is [phone]. Phone number is [phone]

## 2024-11-18 ENCOUNTER — TELEPHONE (OUTPATIENT)
Dept: FAMILY MEDICINE CLINIC | Facility: CLINIC | Age: 84
End: 2024-11-18

## 2024-11-18 NOTE — TELEPHONE ENCOUNTER
Patients daughter called and asked what is going on with this? They stated they returned the chair about a month ago now. They have not heard anything form them again. I inquired about needing the information of the vendor to submit a new order, and she stated that she did not have that information. Asking for us to contact them to get it taken care of since patient has been waiting for 5 months for this.

## 2024-11-19 ENCOUNTER — MED REC SCAN ONLY (OUTPATIENT)
Dept: FAMILY MEDICINE CLINIC | Facility: CLINIC | Age: 84
End: 2024-11-19

## 2024-11-19 NOTE — TELEPHONE ENCOUNTER
This prior diagnosis on 02/19/21 order:    Weakness (R53.1)  Gait instability (R26.81)    Macrina fall, CMA   DY    11/19/24 12:04 PM  Note     What is the diagnosis?        November 18, 2024         Simba Nunez MD to [facility] Clinical Staff       11/18/24  1:17 PM  Note     Ok for order

## 2024-11-19 NOTE — TELEPHONE ENCOUNTER
Order placed on parachute    In this order (1)    Toilet Safety Rail [$]    Toilet Safety Rail Adjustable Length. -     Quantity  1  Supplier  [facility] [employer]

## 2024-11-20 NOTE — TELEPHONE ENCOUNTER
Called and left message for daughter, Mona. Advised her that we just submitted an order for safety toilet rail. There is no new order being processed for lightweight wheelchair. Asked her to clarify how they left it with the vendor and if they need a new order.  Provided number for her to call back.

## 2024-11-25 NOTE — TELEPHONE ENCOUNTER
Called daughter Mona regarding lightweight wheelchair order. She states previous wheelchair was returned. Advised calling insurance to confirm new wheelchair order will be covered. She will call office back once she speaks with insurance.

## 2024-12-21 ENCOUNTER — HOSPITAL ENCOUNTER (EMERGENCY)
Age: 84
Discharge: HOME | End: 2024-12-21
Attending: STUDENT IN AN ORGANIZED HEALTH CARE EDUCATION/TRAINING PROGRAM
Payer: MEDICARE

## 2024-12-21 ENCOUNTER — APPOINTMENT (OUTPATIENT)
Dept: GENERAL RADIOLOGY | Age: 84
End: 2024-12-21
Attending: STUDENT IN AN ORGANIZED HEALTH CARE EDUCATION/TRAINING PROGRAM
Payer: MEDICARE

## 2024-12-21 VITALS
SYSTOLIC BLOOD PRESSURE: 122 MMHG | BODY MASS INDEX: 25.52 KG/M2 | OXYGEN SATURATION: 96 % | TEMPERATURE: 99 F | HEIGHT: 60 IN | DIASTOLIC BLOOD PRESSURE: 60 MMHG | WEIGHT: 130 LBS | HEART RATE: 79 BPM | RESPIRATION RATE: 21 BRPM

## 2024-12-21 DIAGNOSIS — U07.1 COVID-19 VIRUS INFECTION: Primary | ICD-10-CM

## 2024-12-21 LAB
ALBUMIN SERPL-MCNC: 3.5 G/DL (ref 3.2–4.8)
ALBUMIN/GLOB SERPL: 0.7 {RATIO} (ref 1–2)
ALP LIVER SERPL-CCNC: 91 U/L
ALT SERPL-CCNC: 31 U/L
ANION GAP SERPL CALC-SCNC: 7 MMOL/L (ref 0–18)
AST SERPL-CCNC: 50 U/L (ref ?–34)
BASOPHILS # BLD AUTO: 0.04 X10(3) UL (ref 0–0.2)
BASOPHILS NFR BLD AUTO: 0.6 %
BILIRUB SERPL-MCNC: 0.9 MG/DL (ref 0.2–1.1)
BUN BLD-MCNC: 24 MG/DL (ref 9–23)
CALCIUM BLD-MCNC: 9.2 MG/DL (ref 8.7–10.4)
CHLORIDE SERPL-SCNC: 101 MMOL/L (ref 98–112)
CO2 SERPL-SCNC: 25 MMOL/L (ref 21–32)
CREAT BLD-MCNC: 0.95 MG/DL
EGFRCR SERPLBLD CKD-EPI 2021: 79 ML/MIN/1.73M2 (ref 60–?)
EOSINOPHIL # BLD AUTO: 0.02 X10(3) UL (ref 0–0.7)
EOSINOPHIL NFR BLD AUTO: 0.3 %
ERYTHROCYTE [DISTWIDTH] IN BLOOD BY AUTOMATED COUNT: 16 %
GLOBULIN PLAS-MCNC: 4.8 G/DL (ref 2–3.5)
GLUCOSE BLD-MCNC: 82 MG/DL (ref 70–99)
HCT VFR BLD AUTO: 42.5 %
HGB BLD-MCNC: 13.8 G/DL
IMM GRANULOCYTES # BLD AUTO: 0.02 X10(3) UL (ref 0–1)
IMM GRANULOCYTES NFR BLD: 0.3 %
LYMPHOCYTES # BLD AUTO: 0.99 X10(3) UL (ref 1–4)
LYMPHOCYTES NFR BLD AUTO: 15.9 %
MCH RBC QN AUTO: 27.3 PG (ref 26–34)
MCHC RBC AUTO-ENTMCNC: 32.5 G/DL (ref 31–37)
MCV RBC AUTO: 84 FL
MONOCYTES # BLD AUTO: 0.78 X10(3) UL (ref 0.1–1)
MONOCYTES NFR BLD AUTO: 12.6 %
NEUTROPHILS # BLD AUTO: 4.36 X10 (3) UL (ref 1.5–7.7)
NEUTROPHILS # BLD AUTO: 4.36 X10(3) UL (ref 1.5–7.7)
NEUTROPHILS NFR BLD AUTO: 70.3 %
OSMOLALITY SERPL CALC.SUM OF ELEC: 279 MOSM/KG (ref 275–295)
PLATELET # BLD AUTO: 111 10(3)UL (ref 150–450)
PLATELETS.RETICULATED NFR BLD AUTO: 5.5 % (ref 0–7)
POCT INFLUENZA A: NEGATIVE
POCT INFLUENZA B: NEGATIVE
POTASSIUM SERPL-SCNC: 3.8 MMOL/L (ref 3.5–5.1)
PROT SERPL-MCNC: 8.3 G/DL (ref 5.7–8.2)
RBC # BLD AUTO: 5.06 X10(6)UL
SARS-COV-2 RNA RESP QL NAA+PROBE: DETECTED
SODIUM SERPL-SCNC: 133 MMOL/L (ref 136–145)
WBC # BLD AUTO: 6.2 X10(3) UL (ref 4–11)

## 2024-12-21 PROCEDURE — 71045 X-RAY EXAM CHEST 1 VIEW: CPT | Performed by: STUDENT IN AN ORGANIZED HEALTH CARE EDUCATION/TRAINING PROGRAM

## 2024-12-21 PROCEDURE — 99285 EMERGENCY DEPT VISIT HI MDM: CPT

## 2024-12-21 PROCEDURE — 96361 HYDRATE IV INFUSION ADD-ON: CPT

## 2024-12-21 PROCEDURE — 99284 EMERGENCY DEPT VISIT MOD MDM: CPT

## 2024-12-21 PROCEDURE — 80053 COMPREHEN METABOLIC PANEL: CPT | Performed by: STUDENT IN AN ORGANIZED HEALTH CARE EDUCATION/TRAINING PROGRAM

## 2024-12-21 PROCEDURE — 87502 INFLUENZA DNA AMP PROBE: CPT | Performed by: STUDENT IN AN ORGANIZED HEALTH CARE EDUCATION/TRAINING PROGRAM

## 2024-12-21 PROCEDURE — 85025 COMPLETE CBC W/AUTO DIFF WBC: CPT | Performed by: STUDENT IN AN ORGANIZED HEALTH CARE EDUCATION/TRAINING PROGRAM

## 2024-12-21 PROCEDURE — 96374 THER/PROPH/DIAG INJ IV PUSH: CPT

## 2024-12-21 RX ORDER — MECLIZINE HYDROCHLORIDE 25 MG/1
25 TABLET ORAL ONCE
Status: DISCONTINUED | OUTPATIENT
Start: 2024-12-21 | End: 2024-12-21

## 2024-12-21 RX ORDER — ONDANSETRON 2 MG/ML
4 INJECTION INTRAMUSCULAR; INTRAVENOUS ONCE
Status: COMPLETED | OUTPATIENT
Start: 2024-12-21 | End: 2024-12-21

## 2024-12-21 NOTE — ED PROVIDER NOTES
Patient Seen in: [facility] [place]      History     Chief Complaint   Patient presents with    Nausea/Vomiting/Diarrhea     Stated Complaint: vomiting- fever yesterday    Subjective:   HPI    The patient is a 85 y/o M presenting to the ER with complaints of fever, bodyaches decreased appetite. Pt lives with wife, daughter and daughter's family. Pt's wife had a fever a few days ago. Pt had a fever of 102 yesterday per his daughter. She also reports dark stool.  He has a history of cirrhosis in the past and reported variceal banding.  He has not had any hematemesis.        Objective:     Past Medical History:    Anesthesia complication    post-op hiccups    Arthritis    CAD (coronary artery disease)    s/p CABG    Cancer (HCC)    skin    Current episode of major depressive disorder without prior episode    Disorder of liver    cirrhosis    Esophageal reflux    Hearing impairment    no hearing aids    High blood pressure    High cholesterol    History of 2019 novel coronavirus disease (COVID-19)    HTN (hypertension)    Osteoarthritis    RA in all joints    Rheumatoid arthritis (HCC)    Visual impairment              Past Surgical History:   Procedure Laterality Date    Appendectomy      Bypass surgery      Cabg      x4 vessels    Repair ing hernia,6+y/o,reducibl                  Social History     Socioeconomic History    Marital status:    Tobacco Use    Smoking status: Former     Current packs/day: 0.00     Average packs/day: 1 pack/day for 50.0 years (50.0 ttl pk-yrs)     Types: Cigarettes     Start date: 1956     Quit date: 3/26/2005     Years since quittin.7     Passive exposure: Past    Smokeless tobacco: Never    Tobacco comments:     quit smoking about 6 yrs ago   Vaping Use    Vaping status: Never Used   Substance and Sexual Activity    Alcohol use: Not Currently    Drug use: Never   Other Topics Concern    Caffeine Concern No    Exercise Yes    Seat Belt Yes     Special Diet No    Stress Concern No    Weight Concern No     Social Drivers of Health     Food Insecurity: No Food Insecurity (3/25/2024)    Food Insecurity     Food Insecurity: Never true   Transportation Needs: No Transportation Needs (3/25/2024)    Transportation Needs     Lack of Transportation: No   Housing Stability: Low Risk  (3/25/2024)    Housing Stability     Housing Instability: No                  Physical Exam     ED Triage Vitals [12/21/24 0852]   /64   Pulse 82   Resp 12   Temp 98.2 °F (36.8 °C)   Temp src Oral   SpO2 97 %   O2 Device None (Room air)       Current Vitals:   Vital Signs  BP: 122/60  Pulse: 79  Resp: 21  Temp: 98.5 °F (36.9 °C)  Temp src: Tympanic    Oxygen Therapy  SpO2: 96 %  O2 Device: None (Room air)        Physical Exam  Vitals and nursing note reviewed.   Constitutional:       General: He is not in acute distress.     Appearance: Normal appearance.   HENT:      Head: Normocephalic.      Nose: Nose normal.      Mouth/Throat:      Comments: Dry mmm  Eyes:      Extraocular Movements: Extraocular movements intact.      Pupils: Pupils are equal, round, and reactive to light.   Cardiovascular:      Rate and Rhythm: Normal rate and regular rhythm.      Pulses: Normal pulses.   Pulmonary:      Effort: Pulmonary effort is normal.   Abdominal:      General: Abdomen is flat. Bowel sounds are normal. There is no distension.      Palpations: Abdomen is soft.      Tenderness: There is no abdominal tenderness. There is no right CVA tenderness, left CVA tenderness, guarding or rebound.      Hernia: No hernia is present.   Musculoskeletal:         General: No swelling or tenderness. Normal range of motion.      Cervical back: Normal range of motion.   Skin:     General: Skin is warm and dry.   Neurological:      Mental Status: He is alert and oriented to person, place, and time. Mental status is at baseline.   Psychiatric:         Mood and Affect: Mood normal.             ED Course     Labs  Reviewed   CBC WITH DIFFERENTIAL WITH PLATELET - Abnormal; Notable for the following components:       Result Value    .0 (*)     Lymphocyte Absolute 0.99 (*)     All other components within normal limits   COMP METABOLIC PANEL (14) - Abnormal; Notable for the following components:    Sodium 133 (*)     BUN 24 (*)     Total Protein 8.3 (*)     Globulin  4.8 (*)     A/G Ratio 0.7 (*)     All other components within normal limits   REDRAW AST (SGOT) (P) - Abnormal; Notable for the following components:    AST 50 (*)     All other components within normal limits   RAPID SARS-COV-2 BY PCR - Abnormal; Notable for the following components:    Rapid SARS-CoV-2 by PCR Detected (*)     All other components within normal limits   REDRAW POTASSIUM (P) - Normal   POCT FLU TEST - Normal    Narrative:     This assay is a rapid molecular in vitro test utilizing nucleic acid amplification of influenza A and B viral RNA.          XR CHEST AP PORTABLE  (CPT=71045)    Result Date: 12/21/2024  CONCLUSION:  Grossly stable bibasilar atelectasis/scarring, correlate clinically for superimposed infection/inflammation.   LOCATION:  [facility]      Dictated by (CST): Enrique Dow MD on 12/21/2024 at 11:05 AM     Finalized by (CST): Enrique Dow MD on 12/21/2024 at 11:06 AM               [facility]        Medical Decision Making  The differential includes the following  Viral illness, metabolic derangement     Pertinent comorbidities include  As listed above     Pertinent social history includes  As listed above     Labs  Sodium is 133, BUN 24, creatinine normal.  White blood cell count normal hemoglobin is also normal.    Imaging studies  I reviewed the images and my independent interpreation after review is no pneumothorax. Additionaly, I reviewd the radiology report that states the following bibasilar atelectasis/scarring.    External data reviewed      Discussion of management with external providers    ER course  The patient is afebrile  here and hemodynamically stable with no focal findings on physical exam.  His metabolic panel is actually normal appearing and his CBC is only notable for thrombocytopenia of 111K.  Platelets in July were 131K.  The patient is receiving IV fluids.    Pt has tested positive for covid-19. Discussed with patient and daughter. Pt and daughter feel comfortable with discharge home. They have been given strict return precautions.     Disposition and Plan     Clinical Impression:  1. COVID-19 virus infection         Disposition:  Discharge  12/21/2024 11:58 am    Follow-up:  Simba Nunez MD  [id number]  [facility] [id number]  [phone]    Follow up  return to the ER for worse symptoms          Medications Prescribed:  Discharge Medication List as of 12/21/2024 12:27 PM              Supplementary Documentation:

## 2024-12-21 NOTE — ED INITIAL ASSESSMENT (HPI)
Per family, uri sx have been going thru home.  Pt with vomiting and diarrhea for past four days.  Pt with generalized vody aches

## 2024-12-27 ENCOUNTER — OFFICE VISIT (OUTPATIENT)
Age: 84
End: 2024-12-27
Attending: INTERNAL MEDICINE
Payer: MEDICARE

## 2024-12-27 VITALS
WEIGHT: 131 LBS | DIASTOLIC BLOOD PRESSURE: 65 MMHG | BODY MASS INDEX: 26 KG/M2 | RESPIRATION RATE: 18 BRPM | TEMPERATURE: 98 F | OXYGEN SATURATION: 96 % | SYSTOLIC BLOOD PRESSURE: 111 MMHG | HEART RATE: 70 BPM

## 2024-12-27 DIAGNOSIS — M81.0 SENILE OSTEOPOROSIS: Primary | ICD-10-CM

## 2024-12-27 LAB
ALBUMIN SERPL-MCNC: 3.2 G/DL (ref 3.2–4.8)
CALCIUM BLD-MCNC: 9 MG/DL (ref 8.7–10.4)
CREAT BLD-MCNC: 0.82 MG/DL
EGFRCR SERPLBLD CKD-EPI 2021: 87 ML/MIN/1.73M2 (ref 60–?)
MAGNESIUM SERPL-MCNC: 1.6 MG/DL (ref 1.6–2.6)
PHOSPHATE SERPL-MCNC: 2.3 MG/DL (ref 2.4–5.1)

## 2024-12-27 NOTE — PROGRESS NOTES
Pt here for prolia inj . Pt denies any issues or concerns.      Ordering Provider: Hai Betancourt Exp: 0 orders remaining      Pt tolerated infusion without difficulty or complaint. Reviewed next apt date/time: N/A      Education Record  Learner:  Patient an daughter    Disease / Diagnosis: osteoporosis  Barriers / Limitations:  None  Method:  Brief focused  General Topics:  Medication and Plan of care reviewed  Outcome:  Shows understanding       
Bleeding that does not stop/Swelling that gets worse/Pain not relieved by Medications/Fever greater than (need to indicate Fahrenheit or Celsius)/Wound/Surgical Site with redness, or foul smelling discharge or pus/Numbness, tingling, color or temperature change to extremity

## 2024-12-28 ENCOUNTER — TELEPHONE (OUTPATIENT)
Dept: RHEUMATOLOGY | Facility: CLINIC | Age: 84
End: 2024-12-28

## 2024-12-28 DIAGNOSIS — M89.9 DISORDER OF BONE, UNSPECIFIED: ICD-10-CM

## 2024-12-28 DIAGNOSIS — M80.00XA AGE-RELATED OSTEOPOROSIS WITH CURRENT PATHOLOGICAL FRACTURE, INITIAL ENCOUNTER: Primary | ICD-10-CM

## 2025-04-02 ENCOUNTER — LAB ENCOUNTER (OUTPATIENT)
Dept: LAB | Age: 85
End: 2025-04-02
Attending: INTERNAL MEDICINE
Payer: MEDICARE

## 2025-04-02 ENCOUNTER — OFFICE VISIT (OUTPATIENT)
Dept: FAMILY MEDICINE CLINIC | Facility: CLINIC | Age: 85
End: 2025-04-02
Payer: MEDICARE

## 2025-04-02 VITALS
HEIGHT: 60 IN | HEART RATE: 78 BPM | SYSTOLIC BLOOD PRESSURE: 100 MMHG | DIASTOLIC BLOOD PRESSURE: 66 MMHG | WEIGHT: 126 LBS | BODY MASS INDEX: 24.74 KG/M2 | OXYGEN SATURATION: 97 %

## 2025-04-02 DIAGNOSIS — Z00.00 LABORATORY EXAM ORDERED AS PART OF ROUTINE GENERAL MEDICAL EXAMINATION: ICD-10-CM

## 2025-04-02 DIAGNOSIS — J84.9 INTERSTITIAL LUNG DISEASE (HCC): ICD-10-CM

## 2025-04-02 DIAGNOSIS — E78.2 MIXED HYPERLIPIDEMIA: ICD-10-CM

## 2025-04-02 DIAGNOSIS — I25.10 CORONARY ARTERY DISEASE INVOLVING NATIVE CORONARY ARTERY OF NATIVE HEART WITHOUT ANGINA PECTORIS: Primary | ICD-10-CM

## 2025-04-02 DIAGNOSIS — I85.00 ESOPHAGEAL VARICES WITHOUT BLEEDING, UNSPECIFIED ESOPHAGEAL VARICES TYPE (HCC): ICD-10-CM

## 2025-04-02 DIAGNOSIS — M80.00XA AGE-RELATED OSTEOPOROSIS WITH CURRENT PATHOLOGICAL FRACTURE, INITIAL ENCOUNTER: ICD-10-CM

## 2025-04-02 DIAGNOSIS — M06.9 RHEUMATOID ARTHRITIS INVOLVING MULTIPLE SITES, UNSPECIFIED WHETHER RHEUMATOID FACTOR PRESENT (HCC): ICD-10-CM

## 2025-04-02 DIAGNOSIS — M05.10 RHEUMATOID LUNG (HCC): ICD-10-CM

## 2025-04-02 DIAGNOSIS — M25.552 LEFT HIP PAIN: ICD-10-CM

## 2025-04-02 DIAGNOSIS — K74.60 CIRRHOSIS OF LIVER WITHOUT ASCITES, UNSPECIFIED HEPATIC CIRRHOSIS TYPE (HCC): ICD-10-CM

## 2025-04-02 LAB
ALBUMIN SERPL-MCNC: 3.4 G/DL (ref 3.2–4.8)
ALBUMIN/GLOB SERPL: 0.7 {RATIO} (ref 1–2)
ALP LIVER SERPL-CCNC: 113 U/L
ALT SERPL-CCNC: 29 U/L
ANION GAP SERPL CALC-SCNC: 6 MMOL/L (ref 0–18)
AST SERPL-CCNC: 44 U/L (ref ?–34)
BASOPHILS # BLD AUTO: 0.05 X10(3) UL (ref 0–0.2)
BASOPHILS NFR BLD AUTO: 1.2 %
BILIRUB SERPL-MCNC: 0.5 MG/DL (ref 0.2–1.1)
BUN BLD-MCNC: 14 MG/DL (ref 9–23)
CALCIUM BLD-MCNC: 9.5 MG/DL (ref 8.7–10.6)
CHLORIDE SERPL-SCNC: 103 MMOL/L (ref 98–112)
CHOLEST SERPL-MCNC: 100 MG/DL (ref ?–200)
CO2 SERPL-SCNC: 27 MMOL/L (ref 21–32)
CREAT BLD-MCNC: 0.87 MG/DL
EGFRCR SERPLBLD CKD-EPI 2021: 85 ML/MIN/1.73M2 (ref 60–?)
EOSINOPHIL # BLD AUTO: 0.21 X10(3) UL (ref 0–0.7)
EOSINOPHIL NFR BLD AUTO: 5.1 %
ERYTHROCYTE [DISTWIDTH] IN BLOOD BY AUTOMATED COUNT: 15.8 %
FASTING PATIENT LIPID ANSWER: YES
FASTING STATUS PATIENT QL REPORTED: YES
GLOBULIN PLAS-MCNC: 4.6 G/DL (ref 2–3.5)
GLUCOSE BLD-MCNC: 114 MG/DL (ref 70–99)
HCT VFR BLD AUTO: 41.3 %
HDLC SERPL-MCNC: 40 MG/DL (ref 40–59)
HGB BLD-MCNC: 13.1 G/DL
IMM GRANULOCYTES # BLD AUTO: 0.01 X10(3) UL (ref 0–1)
IMM GRANULOCYTES NFR BLD: 0.2 %
LDLC SERPL CALC-MCNC: 46 MG/DL (ref ?–100)
LYMPHOCYTES # BLD AUTO: 1.15 X10(3) UL (ref 1–4)
LYMPHOCYTES NFR BLD AUTO: 27.7 %
MCH RBC QN AUTO: 27.5 PG (ref 26–34)
MCHC RBC AUTO-ENTMCNC: 31.7 G/DL (ref 31–37)
MCV RBC AUTO: 86.6 FL
MONOCYTES # BLD AUTO: 0.58 X10(3) UL (ref 0.1–1)
MONOCYTES NFR BLD AUTO: 14 %
NEUTROPHILS # BLD AUTO: 2.15 X10 (3) UL (ref 1.5–7.7)
NEUTROPHILS # BLD AUTO: 2.15 X10(3) UL (ref 1.5–7.7)
NEUTROPHILS NFR BLD AUTO: 51.8 %
NONHDLC SERPL-MCNC: 60 MG/DL (ref ?–130)
OSMOLALITY SERPL CALC.SUM OF ELEC: 283 MOSM/KG (ref 275–295)
PLATELET # BLD AUTO: 124 10(3)UL (ref 150–450)
POTASSIUM SERPL-SCNC: 4.2 MMOL/L (ref 3.5–5.1)
PROT SERPL-MCNC: 8 G/DL (ref 5.7–8.2)
RBC # BLD AUTO: 4.77 X10(6)UL
SODIUM SERPL-SCNC: 136 MMOL/L (ref 136–145)
T4 FREE SERPL-MCNC: 1.1 NG/DL (ref 0.8–1.7)
TRIGL SERPL-MCNC: 64 MG/DL (ref 30–149)
TSI SER-ACNC: 8.16 UIU/ML (ref 0.55–4.78)
VLDLC SERPL CALC-MCNC: 9 MG/DL (ref 0–30)
WBC # BLD AUTO: 4.2 X10(3) UL (ref 4–11)

## 2025-04-02 PROCEDURE — G2211 COMPLEX E/M VISIT ADD ON: HCPCS | Performed by: FAMILY MEDICINE

## 2025-04-02 PROCEDURE — 99214 OFFICE O/P EST MOD 30 MIN: CPT | Performed by: FAMILY MEDICINE

## 2025-04-02 PROCEDURE — 85025 COMPLETE CBC W/AUTO DIFF WBC: CPT

## 2025-04-02 PROCEDURE — 80053 COMPREHEN METABOLIC PANEL: CPT

## 2025-04-02 PROCEDURE — 80061 LIPID PANEL: CPT

## 2025-04-02 PROCEDURE — 84439 ASSAY OF FREE THYROXINE: CPT

## 2025-04-02 PROCEDURE — 36415 COLL VENOUS BLD VENIPUNCTURE: CPT

## 2025-04-02 PROCEDURE — 84443 ASSAY THYROID STIM HORMONE: CPT

## 2025-04-02 RX ORDER — ROSUVASTATIN CALCIUM 5 MG/1
5 TABLET, COATED ORAL NIGHTLY
Qty: 90 TABLET | Refills: 3 | Status: SHIPPED | OUTPATIENT
Start: 2025-04-02

## 2025-04-02 RX ORDER — HYDROXYCHLOROQUINE SULFATE 200 MG/1
400 TABLET, FILM COATED ORAL DAILY
Qty: 180 TABLET | Refills: 3 | Status: SHIPPED | OUTPATIENT
Start: 2025-04-02

## 2025-04-02 RX ORDER — CARVEDILOL 3.12 MG/1
3.12 TABLET ORAL 2 TIMES DAILY
Qty: 180 TABLET | Refills: 3 | Status: SHIPPED | OUTPATIENT
Start: 2025-04-02

## 2025-04-02 RX ORDER — CHOLECALCIFEROL (VITAMIN D3) 50 MCG
2000 TABLET ORAL DAILY
Qty: 90 TABLET | Refills: 3 | Status: SHIPPED | OUTPATIENT
Start: 2025-04-02

## 2025-04-02 NOTE — PROGRESS NOTES
The following individual(s) verbally consented to be recorded using ambient AI listening technology and understand that they can each withdraw their consent to this listening technology at any point by asking the clinician to turn off or pause the recording:    Patient name: Carlos Howell  Additional names:

## 2025-04-07 DIAGNOSIS — E03.8 SUBCLINICAL HYPOTHYROIDISM: Primary | ICD-10-CM

## 2025-04-17 ENCOUNTER — TELEPHONE (OUTPATIENT)
Dept: FAMILY MEDICINE CLINIC | Facility: CLINIC | Age: 85
End: 2025-04-17

## 2025-04-17 PROBLEM — M80.00XA AGE-RELATED OSTEOPOROSIS WITH CURRENT PATHOLOGICAL FRACTURE: Status: ACTIVE | Noted: 2025-04-17

## 2025-04-17 PROBLEM — M81.0 SENILE OSTEOPOROSIS: Status: RESOLVED | Noted: 2017-11-30 | Resolved: 2025-04-17

## 2025-04-17 PROBLEM — I10 ESSENTIAL HYPERTENSION: Status: RESOLVED | Noted: 2020-12-15 | Resolved: 2025-04-17

## 2025-04-17 PROBLEM — K74.60 CIRRHOSIS OF LIVER WITHOUT ASCITES (HCC): Status: ACTIVE | Noted: 2025-04-17

## 2025-04-17 PROBLEM — J18.9 PNEUMONIA OF BOTH LUNGS DUE TO INFECTIOUS ORGANISM: Status: RESOLVED | Noted: 2022-05-21 | Resolved: 2025-04-17

## 2025-04-17 NOTE — TELEPHONE ENCOUNTER
Called daughter Mona (ok per HIPAA). She was unsure if left hip pain was getting worse. She is going to call patient now and will call us back with update.

## 2025-04-17 NOTE — TELEPHONE ENCOUNTER
Can we get an update on Carlos's left hip pain? Has it been progressing?     If needed can order XR for further evaluation if needed.

## 2025-04-17 NOTE — PROGRESS NOTES
[facility] Office Note  Chief Complaint:   Chief Complaint   Patient presents with    Follow - Up     6 month follow up   Also is having hip pain on his left side it consistent and steady for about a month now   Daughter would also like to talk about changing his appointment to yearly        HPI:   This is a 85 year old male coming in for chronic condition follow up.     Since his last OV in 10/2025 he was seen in the ER on 12/21/24 for COVID19 infection. He was treated w/ conservative measures. Symptoms resolved without any complications.     CAD - stable, following with cardiology. No anginal symptoms.     Osteoporosis - receiving prolia through rheumatology.     RA, RA-ILD - following with rheumatology, pulmonology. Considering possible ritxumiab. Currently on hydroxychloroquine. Reports hip pain in L worsening. No recent falls, trauma.     Hyperlipidemia - compliant with rosuvastatin. Denies any concerns.     Cirrhosis - following with GI/hepatology. H/o esophageal varices. Recent EGD in 09/2024. Findings showed mild gastritis, hiatal hernia without any varices. Biopsies were normal. Plan for repeat EGD in 09/2025.     Past Medical History[1]  Past Surgical History[2]  Social History:  Short Social Hx on File[3]  Family History:  Family History[4]  Allergies:  Allergies[5]  Current Meds:  Current Medications[6]   Counseling given: Not Answered  Tobacco comments: quit smoking about 6 yrs ago       REVIEW OF SYSTEMS:   Review of Systems   Constitutional: Negative.    Respiratory: Negative.     Cardiovascular: Negative.    Gastrointestinal: Negative.    Musculoskeletal:  Positive for arthralgias.   Neurological: Negative.         EXAM:   /66   Pulse 78   Ht 5' (1.524 m)   Wt 126 lb (57.2 kg)   SpO2 97%   BMI 24.61 kg/m²  Estimated body mass index is 24.61 kg/m² as calculated from the following:    Height as of this encounter: 5' (1.524 m).    Weight as of this encounter: 126 lb  (57.2 kg).   Vital signs reviewed.Appears stated age, well groomed.  Physical Exam  Vitals and nursing note reviewed.   Constitutional:       Appearance: Normal appearance.   HENT:      Head: Normocephalic and atraumatic.   Cardiovascular:      Rate and Rhythm: Normal rate and regular rhythm.      Pulses: Normal pulses.      Heart sounds: Normal heart sounds. No murmur heard.  Pulmonary:      Effort: Pulmonary effort is normal. No respiratory distress.      Breath sounds: Normal breath sounds. No stridor. No wheezing or rhonchi.   Musculoskeletal:      Right hip: Decreased range of motion.      Left hip: Decreased range of motion.      Right lower leg: No edema.      Left lower leg: No edema.   Neurological:      Mental Status: He is alert and oriented to person, place, and time.   Psychiatric:         Mood and Affect: Mood normal.          ASSESSMENT AND PLAN:   1. Coronary artery disease involving native coronary artery of native heart without angina pectoris  Stable, no anginal symptoms. Continue present mgmt. Has not seen cardiology recently.   - carvedilol 3.125 MG Oral Tab; Take 1 tablet (3.125 mg total) by mouth 2 (two) times daily.  Dispense: 180 tablet; Refill: 3    2. 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, initial encounter  Stable, no recent fractures. He is on prolia. Continue vitamin D/calcium supplementation, weight bearing exercise as tolerated.   - Cholecalciferol (VITAMIN D) 50 MCG (2000 UT) Oral Tab; Take 2,000 Units by mouth daily.  Dispense: 90 tablet; Refill: 3    3. Rheumatoid arthritis involving multiple sites, unspecified whether rheumatoid factor present (HCC)  Stable, following with rheumatology. CPM  - hydroxychloroquine 200 MG Oral Tab; Take 2 tablets (400 mg total) by mouth daily.  Dispense: 180 tablet; Refill: 3    4. Mixed hyperlipidemia  Stable, CPM. Due for lipids.   - rosuvastatin 5 MG Oral Tab; Take 1 tablet (5 mg total) by mouth nightly.  Dispense: 90 tablet;  Refill: 3    5. Laboratory exam ordered as part of routine general medical examination  - CBC With Differential With Platelet; Future  - Comp Metabolic Panel (14); Future  - Lipid Panel; Future  - TSH W Reflex To Free T4; Future    6. Interstitial lung disease (HCC)  Stable, following with pulmonology.     7. Cirrhosis of liver without ascites, unspecified hepatic cirrhosis type (HCC)  Stable, following with GI/hepatology.     8. Rheumatoid lung (HCC)  See #6.     9. Esophageal varices without bleeding, unspecified esophageal varices type (HCC)  No recent issues. Following with GI/hepatology.     10. Left hip pain  Suspect OA. Recommended conservative mgmt including heat/ice, stretching/conditioning exercises, otc analgesics prn. Consider imaging and/or PT referral if no improvement in symptoms.       Meds & Refills for this Visit:  Requested Prescriptions     Signed Prescriptions Disp Refills    carvedilol 3.125 MG Oral Tab 180 tablet 3     Sig: Take 1 tablet (3.125 mg total) by mouth 2 (two) times daily.    Cholecalciferol (VITAMIN D) 50 MCG (2000 UT) Oral Tab 90 tablet 3     Sig: Take 2,000 Units by mouth daily.    hydroxychloroquine 200 MG Oral Tab 180 tablet 3     Sig: Take 2 tablets (400 mg total) by mouth daily.    rosuvastatin 5 MG Oral Tab 90 tablet 3     Sig: Take 1 tablet (5 mg total) by mouth nightly.       Health Maintenance:  Health Maintenance Due   Topic Date Due    Zoster Vaccines (1 of 2) Never done    COVID-19 Vaccine (4 - 2024-25 season) 09/01/2024    Fall Risk Screening (Annual)  01/01/2025       Patient/Caregiver Education: Patient/Caregiver Education: There are no barriers to learning. Medical education done.   Outcome: Patient verbalizes understanding. Patient is notified to call with any questions, complications, allergies, or worsening or changing symptoms.  Patient is to call with any side effects or complications from the treatments as a result of today.     Problem List:  Problem  List[7]         [1]   Past Medical History:   Anesthesia complication    post-op hiccups    Arthritis    CAD (coronary artery disease)    s/p CABG    Cancer (HCC)    skin    Current episode of major depressive disorder without prior episode    Disorder of liver    cirrhosis    Esophageal reflux    Hearing impairment    no hearing aids    High blood pressure    High cholesterol    History of 2019 novel coronavirus disease (COVID-19)    HTN (hypertension)    Osteoarthritis    RA in all joints    Rheumatoid arthritis (HCC)    Visual impairment   [2]   Past Surgical History:  Procedure Laterality Date    Appendectomy      Bypass surgery      Cabg      x4 vessels    Repair ing hernia,5+y/o,reducibl     [3]   Social History  Socioeconomic History    Marital status:    Tobacco Use    Smoking status: Former     Current packs/day: 0.00     Average packs/day: 1 pack/day for 50.0 years (50.0 ttl pk-yrs)     Types: Cigarettes     Start date: 1956     Quit date: 3/26/2005     Years since quittin.0     Passive exposure: Past    Smokeless tobacco: Never    Tobacco comments:     quit smoking about 6 yrs ago   Vaping Use    Vaping status: Never Used   Substance and Sexual Activity    Alcohol use: Not Currently    Drug use: Never   Other Topics Concern    Caffeine Concern No    Exercise Yes    Seat Belt Yes    Special Diet No    Stress Concern No    Weight Concern No     Social Drivers of Health     Food Insecurity: No Food Insecurity (2025)    NCSS - Food Insecurity     Worried About Running Out of Food in the Last Year: No     Ran Out of Food in the Last Year: No   Transportation Needs: No Transportation Needs (2025)    NCSS - Transportation     Lack of Transportation: No   Housing Stability: Not At Risk (2025)    NCSS - Housing/Utilities     Has Housing: Yes     Worried About Losing Housing: No     Unable to Get Utilities: No   [4]   Family History  Problem Relation Age of Onset    Other (Other) Father     [5]   Allergies  Allergen Reactions    Azithromycin RASH     Face flushing   [6]   Current Outpatient Medications   Medication Sig Dispense Refill    carvedilol 3.125 MG Oral Tab Take 1 tablet (3.125 mg total) by mouth 2 (two) times daily. 180 tablet 3    Cholecalciferol (VITAMIN D) 50 MCG (2000 UT) Oral Tab Take 2,000 Units by mouth daily. 90 tablet 3    hydroxychloroquine 200 MG Oral Tab Take 2 tablets (400 mg total) by mouth daily. 180 tablet 3    rosuvastatin 5 MG Oral Tab Take 1 tablet (5 mg total) by mouth nightly. 90 tablet 3    fluticasone furoate-vilanterol (BREO ELLIPTA) 100-25 MCG/ACT Inhalation Aerosol Powder, Breath Activated Inhale 1 puff into the lungs daily. (Patient not taking: Reported on 4/2/2025) 3 each 3    albuterol 108 (90 Base) MCG/ACT Inhalation Aero Soln Inhale 2 puffs into the lungs every 6 (six) hours as needed for Wheezing. (Patient not taking: Reported on 4/2/2025) 3 each 3   [7]   Patient Active Problem List  Diagnosis    Rheumatoid arthritis (HCC)    Gastroesophageal reflux disease with esophagitis without hemorrhage    Coronary artery disease involving native coronary artery of native heart without angina pectoris    Falling episodes    Interstitial lung disease (HCC)    Weakness generalized    Esophageal varices without bleeding (HCC)    Bilateral primary osteoarthritis of knee    Bilateral carpal tunnel syndrome    Rheumatoid lung (HCC)    Cirrhosis of liver without ascites (HCC)    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

## 2025-04-18 NOTE — TELEPHONE ENCOUNTER
Attempted to call home phone. Phone number was pt's daughter Henna (on [employer]IP[employer]). Henna did not have any concerns with his hip pain. Stated he has arthritis and has not complained of worsening pain. Stated he lives with her and stated ok to let Dr. Nunez know he is doing okay. Stated she will ask again when she gets home and call us back if any changes. Stated if we don't receive call back then he is okay. Advised to call with any changes. Appreciative of call and follow up.     ELAN Nunez

## 2025-06-16 ENCOUNTER — APPOINTMENT (OUTPATIENT)
Dept: GENERAL RADIOLOGY | Age: 85
End: 2025-06-16
Attending: EMERGENCY MEDICINE
Payer: MEDICARE

## 2025-06-16 ENCOUNTER — HOSPITAL ENCOUNTER (INPATIENT)
Facility: HOSPITAL | Age: 85
LOS: 2 days | Discharge: HOME HEALTH | End: 2025-06-18
Attending: EMERGENCY MEDICINE | Admitting: INTERNAL MEDICINE
Payer: MEDICARE

## 2025-06-16 ENCOUNTER — APPOINTMENT (OUTPATIENT)
Dept: ULTRASOUND IMAGING | Age: 85
End: 2025-06-16
Attending: EMERGENCY MEDICINE
Payer: MEDICARE

## 2025-06-16 DIAGNOSIS — I50.33 ACUTE ON CHRONIC HEART FAILURE WITH PRESERVED EJECTION FRACTION (HCC): ICD-10-CM

## 2025-06-16 DIAGNOSIS — M79.89 LEG SWELLING: Primary | ICD-10-CM

## 2025-06-16 DIAGNOSIS — E87.70 HYPERVOLEMIA, UNSPECIFIED HYPERVOLEMIA TYPE: ICD-10-CM

## 2025-06-16 PROBLEM — R60.0 LOWER EXTREMITY EDEMA: Status: ACTIVE | Noted: 2025-06-16

## 2025-06-16 LAB
ALBUMIN SERPL-MCNC: 3.3 G/DL (ref 3.2–4.8)
ALBUMIN/GLOB SERPL: 0.7 {RATIO} (ref 1–2)
ALP LIVER SERPL-CCNC: 136 U/L (ref 45–117)
ALT SERPL-CCNC: 31 U/L (ref 10–49)
ANION GAP SERPL CALC-SCNC: 3 MMOL/L (ref 0–18)
APTT PPP: 29.2 SECONDS (ref 23–36)
AST SERPL-CCNC: 52 U/L (ref ?–34)
BASOPHILS # BLD AUTO: 0.05 X10(3) UL (ref 0–0.2)
BASOPHILS NFR BLD AUTO: 1.1 %
BILIRUB SERPL-MCNC: 0.5 MG/DL (ref 0.2–1.1)
BUN BLD-MCNC: 20 MG/DL (ref 9–23)
CALCIUM BLD-MCNC: 8.9 MG/DL (ref 8.7–10.6)
CHLORIDE SERPL-SCNC: 102 MMOL/L (ref 98–112)
CO2 SERPL-SCNC: 29 MMOL/L (ref 21–32)
CREAT BLD-MCNC: 0.97 MG/DL (ref 0.7–1.3)
EGFRCR SERPLBLD CKD-EPI 2021: 77 ML/MIN/1.73M2 (ref 60–?)
EOSINOPHIL # BLD AUTO: 0.19 X10(3) UL (ref 0–0.7)
EOSINOPHIL NFR BLD AUTO: 4.2 %
ERYTHROCYTE [DISTWIDTH] IN BLOOD BY AUTOMATED COUNT: 15.7 %
GLOBULIN PLAS-MCNC: 4.5 G/DL (ref 2–3.5)
GLUCOSE BLD-MCNC: 100 MG/DL (ref 70–99)
HCT VFR BLD AUTO: 37.5 % (ref 39–53)
HGB BLD-MCNC: 12.1 G/DL (ref 13–17.5)
IMM GRANULOCYTES # BLD AUTO: 0.02 X10(3) UL (ref 0–1)
IMM GRANULOCYTES NFR BLD: 0.4 %
INR BLD: 1.22 (ref 0.8–1.2)
LYMPHOCYTES # BLD AUTO: 1.35 X10(3) UL (ref 1–4)
LYMPHOCYTES NFR BLD AUTO: 29.9 %
MCH RBC QN AUTO: 28.2 PG (ref 26–34)
MCHC RBC AUTO-ENTMCNC: 32.3 G/DL (ref 31–37)
MCV RBC AUTO: 87.4 FL (ref 80–100)
MONOCYTES # BLD AUTO: 0.71 X10(3) UL (ref 0.1–1)
MONOCYTES NFR BLD AUTO: 15.7 %
NEUTROPHILS # BLD AUTO: 2.19 X10 (3) UL (ref 1.5–7.7)
NEUTROPHILS # BLD AUTO: 2.19 X10(3) UL (ref 1.5–7.7)
NEUTROPHILS NFR BLD AUTO: 48.7 %
NT-PROBNP SERPL-MCNC: 513 PG/ML (ref ?–450)
OSMOLALITY SERPL CALC.SUM OF ELEC: 281 MOSM/KG (ref 275–295)
PLATELET # BLD AUTO: 151 10(3)UL (ref 150–450)
POTASSIUM SERPL-SCNC: 4 MMOL/L (ref 3.5–5.1)
PROT SERPL-MCNC: 7.8 G/DL (ref 5.7–8.2)
PROTHROMBIN TIME: 15.2 SECONDS (ref 11.6–14.8)
RBC # BLD AUTO: 4.29 X10(6)UL (ref 3.8–5.8)
SODIUM SERPL-SCNC: 134 MMOL/L (ref 136–145)
TROPONIN I SERPL HS-MCNC: 17 NG/L (ref ?–53)
WBC # BLD AUTO: 4.5 X10(3) UL (ref 4–11)

## 2025-06-16 PROCEDURE — 71045 X-RAY EXAM CHEST 1 VIEW: CPT | Performed by: EMERGENCY MEDICINE

## 2025-06-16 PROCEDURE — 93970 EXTREMITY STUDY: CPT | Performed by: EMERGENCY MEDICINE

## 2025-06-16 PROCEDURE — 99223 1ST HOSP IP/OBS HIGH 75: CPT | Performed by: INTERNAL MEDICINE

## 2025-06-16 RX ORDER — ONDANSETRON 2 MG/ML
4 INJECTION INTRAMUSCULAR; INTRAVENOUS EVERY 6 HOURS PRN
Status: DISCONTINUED | OUTPATIENT
Start: 2025-06-16 | End: 2025-06-18

## 2025-06-16 RX ORDER — ROSUVASTATIN CALCIUM 5 MG/1
5 TABLET, COATED ORAL NIGHTLY
Status: DISCONTINUED | OUTPATIENT
Start: 2025-06-16 | End: 2025-06-18

## 2025-06-16 RX ORDER — FLUTICASONE PROPIONATE AND SALMETEROL 250; 50 UG/1; UG/1
1 POWDER RESPIRATORY (INHALATION) 2 TIMES DAILY
Status: DISCONTINUED | OUTPATIENT
Start: 2025-06-16 | End: 2025-06-18

## 2025-06-16 RX ORDER — FUROSEMIDE 10 MG/ML
20 INJECTION INTRAMUSCULAR; INTRAVENOUS DAILY
Status: DISCONTINUED | OUTPATIENT
Start: 2025-06-17 | End: 2025-06-16

## 2025-06-16 RX ORDER — ALBUTEROL SULFATE 90 UG/1
2 INHALANT RESPIRATORY (INHALATION) EVERY 6 HOURS PRN
Status: DISCONTINUED | OUTPATIENT
Start: 2025-06-16 | End: 2025-06-18

## 2025-06-16 RX ORDER — FUROSEMIDE 10 MG/ML
20 INJECTION INTRAMUSCULAR; INTRAVENOUS
Status: DISCONTINUED | OUTPATIENT
Start: 2025-06-17 | End: 2025-06-18

## 2025-06-16 RX ORDER — ENOXAPARIN SODIUM 100 MG/ML
40 INJECTION SUBCUTANEOUS DAILY
Status: DISCONTINUED | OUTPATIENT
Start: 2025-06-16 | End: 2025-06-18

## 2025-06-16 RX ORDER — HYDROXYCHLOROQUINE SULFATE 200 MG/1
200 TABLET, FILM COATED ORAL 2 TIMES DAILY
Status: DISCONTINUED | OUTPATIENT
Start: 2025-06-16 | End: 2025-06-18

## 2025-06-16 RX ORDER — FUROSEMIDE 10 MG/ML
20 INJECTION INTRAMUSCULAR; INTRAVENOUS ONCE
Status: COMPLETED | OUTPATIENT
Start: 2025-06-16 | End: 2025-06-16

## 2025-06-16 RX ORDER — CARVEDILOL 3.12 MG/1
3.12 TABLET ORAL 2 TIMES DAILY WITH MEALS
Status: DISCONTINUED | OUTPATIENT
Start: 2025-06-16 | End: 2025-06-18

## 2025-06-16 RX ORDER — ACETAMINOPHEN 500 MG
500 TABLET ORAL EVERY 4 HOURS PRN
Status: DISCONTINUED | OUTPATIENT
Start: 2025-06-16 | End: 2025-06-18

## 2025-06-16 NOTE — DISCHARGE INSTRUCTIONS
Going Home Instructions  In this section you will find the tools which will guide you through the first few days after you leave the hospital. Continued use of these tools will help you develop the skills necessary to keep your heart failure under control.     Home Care Instructions Following Heart Failure - the most important things to do every day include:   Weigh yourself and review the “Self-Check Plan” sheet every morning.   Call your cardiologist office if you are in the “Pay Attention-Use Caution” (yellow zone) or “Medical Alert-Warning!” (red zone) as outlined in the Self-Check Plan sheet.  Take your medicines as prescribed.  Limit your sodium (salt) intake.  Know when to call your cardiologist, primary doctor, or nurse.  Know when to seek emergency care.      Things for You to Remember:   1. See your doctor or healthcare provider as written on your discharge instructions.  It is important that you attend this appointment to make sure your symptoms are under control.     2. Your recommended sodium intake is [id number] mg daily.    3.  Weigh yourself every day.    4. Some exercise and activity is important to help keep your heart functioning and strong. Unless instructed not to exercise, you may walk at a slow to moderate pace for 10-15 minutes 2-3 days per week to start. Pace your activity to prevent shortness of breath or fatigue. Stop exercising if you develop chest pain, lightheadedness, or significant shortness of breath.       Call Your Cardiologist If:   You gain 2-3 pounds in one day or 5 pounds in one week.  You have more difficulty breathing.  You are getting more tired with normal activity.  You are more short of breath lying down, or awaken at night short of breath.  You have swelling of your feet or legs.  You urinate less often during the day and more often at night.  You have cramps in your legs.  You have blurred vision or see yellowish-green halos around objects of lights.    Go to the  Emergency Room If:   You have pain or tightness in your chest  You are extremely short of breath  You are coughing up pink-frothy mucus  You are traveling and develop symptoms of worsening heart failure      ** Please follow up with your cardiologist or Advanced Practice Provider as written on your discharge instructions. If you are not provided with an appointment, let your nurse know so you can get an appointment**  Wound Care instructions     Wound Cleaning and Dressings:  Wound cleansing:  Cleanse with saline  Wound product: Honey gel and Bordered foam  Dressing change frequency:  Change dressing daily and/or PRN    [facility] Phone: [phone] Fax: [phone]    Sometimes managing your health at home requires assistance.  The [facility] team has recognized your preference to use [facility].  They can be reached by phone at [phone].  The fax number for your reference is [phone].  A representative from the home health agency will contact you or your family to schedule your first visit.      Consider adding Ensure Plus 2x per day at home or like protein shake to increase protein intakes for wound healing.

## 2025-06-16 NOTE — ED PROVIDER NOTES
Patient Seen in: [facility] [place]        History  Chief Complaint   Patient presents with    Swelling Edema     Stated Complaint: bilat leg swelling for 2 weeks, no SOB    Subjective:   HPI            Patient is a 86 yo M with a history of non alcoholic liver cirrhosis, RA, CAD s/p CABG, CHF (grade 1 diastolic dysfunction, EF 40-45% in 2022), ILD who presents to ED for evaluation  of b/l LE swelling over past 1-2 weeks progressing from his ankles to his knees. Pt is accompanied by his daughters and wife who assist with history. Pt did not tell them of the LE until today which is what prompted them to bring him to ED. Pt has never had this before. He reports associated LE pain, worse in R calf. Pt c/o SOB which is near his baseline. He occasionally has L sided chest pain which he attributes to indigestion. He also has chronic cough. He denies any fevers, vomiting, abd pain, orthopnea. No hx of blood clots. Pt is not on any AC. Pt's family states pt had recent surgery on his nose to remove skin cancer a few weeks ago and he has not been moving around as much. Pt ambulates with walker at baseline.        Objective:     Past Medical History:    Anesthesia complication    post-op hiccups    Arthritis    CAD (coronary artery disease)    s/p CABG    Cancer (HCC)    skin    Current episode of major depressive disorder without prior episode    Disorder of liver    cirrhosis    Esophageal reflux    Hearing impairment    no hearing aids    High blood pressure    High cholesterol    History of 2019 novel coronavirus disease (COVID-19)    HTN (hypertension)    Osteoarthritis    RA in all joints    Rheumatoid arthritis (HCC)    Visual impairment              Past Surgical History:   Procedure Laterality Date    Appendectomy      Bypass surgery      Cabg      x4 vessels    Repair ing hernia,5+y/o,reducibl      Skin tissue procedure unlisted                  Social History     Socioeconomic History     Marital status:    Tobacco Use    Smoking status: Former     Current packs/day: 0.00     Average packs/day: 1 pack/day for 50.0 years (50.0 ttl pk-yrs)     Types: Cigarettes     Start date: 1956     Quit date: 3/26/2005     Years since quittin.2     Passive exposure: Past    Smokeless tobacco: Never    Tobacco comments:     quit smoking about 6 yrs ago   Vaping Use    Vaping status: Never Used   Substance and Sexual Activity    Alcohol use: Not Currently    Drug use: Never   Other Topics Concern    Caffeine Concern No    Exercise Yes    Seat Belt Yes    Special Diet No    Stress Concern No    Weight Concern No     Social Drivers of Health     Food Insecurity: No Food Insecurity (2025)    NCSS - Food Insecurity     Worried About Running Out of Food in the Last Year: No     Ran Out of Food in the Last Year: No   Transportation Needs: No Transportation Needs (2025)    NCSS - Transportation     Lack of Transportation: No   Housing Stability: Not At Risk (2025)    NCSS - Housing/Utilities     Has Housing: Yes     Worried About Losing Housing: No     Unable to Get Utilities: No                                Physical Exam    ED Triage Vitals [25 1032]   BP 93/41   Pulse 72   Resp 18   Temp 98.8 °F (37.1 °C)   Temp src Temporal   SpO2 95 %   O2 Device None (Room air)       Current Vitals:   Vital Signs  BP: 104/52  Pulse: 76  Resp: 18  Temp: 98.8 °F (37.1 °C)  Temp src: Temporal    Oxygen Therapy  SpO2: 98 %  O2 Device: None (Room air)            Physical Exam  Vitals and nursing note reviewed.   Constitutional:       General: He is not in acute distress.  HENT:      Head: Normocephalic and atraumatic.      Comments: Well healing surgical incision on nose with skin graft     Nose: Nose normal.      Mouth/Throat:      Mouth: Mucous membranes are moist.   Eyes:      Extraocular Movements: Extraocular movements intact.      Pupils: Pupils are equal, round, and reactive to light.    Cardiovascular:      Rate and Rhythm: Normal rate and regular rhythm.      Pulses: Normal pulses.   Pulmonary:      Effort: Pulmonary effort is normal. No respiratory distress.      Breath sounds: No wheezing.   Abdominal:      General: There is no distension.      Palpations: Abdomen is soft.   Musculoskeletal:         General: No swelling. Normal range of motion.      Cervical back: Normal range of motion.      Right lower leg: Edema present.      Left lower leg: Edema present.      Comments: B/l pitting edema, RLE >LLE   B/l calf TTP  Compartments soft  No pain out of proportion  Distally NVI   Skin:     General: Skin is warm and dry.      Capillary Refill: Capillary refill takes less than 2 seconds.   Neurological:      General: No focal deficit present.      Mental Status: He is alert.   Psychiatric:         Mood and Affect: Mood normal.                 ED Course  Labs Reviewed   CBC WITH DIFFERENTIAL WITH PLATELET - Abnormal; Notable for the following components:       Result Value    HGB 12.1 (*)     HCT 37.5 (*)     All other components within normal limits   COMP METABOLIC PANEL (14) - Abnormal; Notable for the following components:    Glucose 100 (*)     Sodium 134 (*)     AST 52 (*)     Alkaline Phosphatase 136 (*)     Globulin  4.5 (*)     A/G Ratio 0.7 (*)     All other components within normal limits   PRO BETA NATRIURETIC PEPTIDE - Abnormal; Notable for the following components:    Pro-Beta Natriuretic Peptide 513 (*)     All other components within normal limits   PROTHROMBIN TIME (PT) - Abnormal; Notable for the following components:    PT 15.2 (*)     INR 1.22 (*)     All other components within normal limits   TROPONIN I HIGH SENSITIVITY - Normal   PTT, ACTIVATED - Normal   SCAN SLIDE   RAINBOW DRAW BLUE     EKG    Rate, intervals and axes as noted on EKG Report.  Rate: 65  Rhythm: Sinus Rhythm  Reading: NSR with ventricular rate of 65, 1st degree AV block, no acute st elevations or depressions,  LAD, similar to prior.               MDM     Patient is a 86 yo M with a history of non alcoholic liver cirrhosis, RA, CAD s/p CABG, CHF (grade 1 diastolic dysfunction, EF 40-45% in 2022) who presents to ED for evaluation  of b/l LE swelling over past 1-2 weeks progressing from his ankles to his knees (R>L). VSS. Pt has bilateral pitting LE edema on exam, no resp distress. Differential diagnosis includes but not limited to DVT, chronic venous stasis, fluid overload, renal insufficiency. Plan for labs, CXR, EKG, b/l LE US.      Admission disposition: 6/16/2025  2:19 PM       ED Course as of 06/16/25 1519  ------------------------------------------------------------  Time: 06/16 1333  Comment: CONCLUSION:  Bilateral Baker's cyst but no sign of DVT.         ------------------------------------------------------------  Time: 06/16 1333  Comment: CBC shows no leukocytosis, hgb 12.1. CMP with sodium 134, , AST 52. LFTs similar to prior. Troponin negative. , slightly increased from prior. CXR shows bilateral opacities which are stable but could be from ILD.   ------------------------------------------------------------  Time: 06/16 1419  Comment: Discussed with [facility]. Will give IV lasix 20 mg and admit.          Medical Decision Making  Amount and/or Complexity of Data Reviewed  External Data Reviewed: radiology, ECG and notes.  Labs: ordered. Decision-making details documented in ED Course.  Radiology: ordered and independent interpretation performed. Decision-making details documented in ED Course.  ECG/medicine tests: ordered and independent interpretation performed. Decision-making details documented in ED Course.  Discussion of management or test interpretation with external provider(s): [facility]    Risk  Decision regarding hospitalization.        Disposition and Plan     Clinical Impression:  1. Leg swelling    2. Hypervolemia, unspecified hypervolemia type         Disposition:  Admit  6/16/2025  2:19  pm    Follow-up:  No follow-up provider specified.        Medications Prescribed:  Current Discharge Medication List                Supplementary Documentation:         Hospital Problems       Present on Admission  Date Reviewed: 4/18/2025          ICD-10-CM Noted POA    Lower extremity edema R60.0 6/16/2025 Unknown

## 2025-06-16 NOTE — H&P
[facility]                                                               History & Physical         Carlos Howell Patient Status:  Inpatient    3/26/1940 MRN [id number]   Location [facility] 8NE-A Attending Bridgette Willett MD   Hosp Day # 0 PCP Simba Nunez MD     Chief Complaint: Bilateral leg swelling and edema    History of Present Illness:  Carlos Howell is a 85 year old male admitted with bilateral leg swelling and edema.  Started 2 weeks back and been gradually progressing according to patient.  Denies any chest pain.  Denies any shortness of breath.  Denies any abdominal pain.  Denies any nausea vomiting or diarrhea.  Denies any constipation.  Denies any fever or chills.  Patient states he has had skin cancer on his nose and had recent surgery for this few weeks back and still has swelling on the right nostril from the same.  Patient states he ambulates with a walker at baseline.      History:  Past Medical History:    Anesthesia complication    post-op hiccups    Arthritis    CAD (coronary artery disease)    s/p CABG    Cancer (HCC)    skin    Current episode of major depressive disorder without prior episode    Disorder of liver    cirrhosis    Esophageal reflux    Hearing impairment    no hearing aids    High blood pressure    High cholesterol    History of 2019 novel coronavirus disease (COVID-19)    HTN (hypertension)    Osteoarthritis    RA in all joints    Rheumatoid arthritis (HCC)    Visual impairment       Past Surgical History:   Procedure Laterality Date    Appendectomy      Bypass surgery      Cabg      x4 vessels    Repair ing hernia,5+y/o,reducibl      Skin tissue procedure unlisted         Family history:  Family History   Problem Relation Age of Onset    Other (Other) Father       Reviewed    Social history:   reports that he quit smoking about 20 years ago. His smoking use included cigarettes. He started smoking about 69 years ago. He has a 50  pack-year smoking history. He has been exposed to tobacco smoke. He has never used smokeless tobacco. He reports that he does not currently use alcohol. He reports that he does not use drugs.    Allergies:  Allergies   Allergen Reactions    Azithromycin RASH     Face flushing       Home Medications:  Medications Prior to Admission   Medication Sig Dispense Refill Last Dose/Taking    fluticasone furoate-vilanterol (BREO ELLIPTA) 100-25 MCG/ACT Inhalation Aerosol Powder, Breath Activated Inhale 1 puff into the lungs daily. 3 each 3 Taking    albuterol 108 (90 Base) MCG/ACT Inhalation Aero Soln Inhale 2 puffs into the lungs every 6 (six) hours as needed for Wheezing. 3 each 3 Taking As Needed    carvedilol 3.125 MG Oral Tab Take 1 tablet (3.125 mg total) by mouth 2 (two) times daily. 180 tablet 3     Cholecalciferol (VITAMIN D) 50 MCG (2000 UT) Oral Tab Take 2,000 Units by mouth daily. 90 tablet 3     hydroxychloroquine 200 MG Oral Tab Take 2 tablets (400 mg total) by mouth daily. 180 tablet 3     rosuvastatin 5 MG Oral Tab Take 1 tablet (5 mg total) by mouth nightly. 90 tablet 3        Review of Systems:  A comprehensive 14 point review of systems was completed.  Pertinent positives and negatives noted in the the HPI.    Physical Exam:     Vital signs: Blood pressure 136/73, pulse 85, temperature 97.3 °F (36.3 °C), temperature source Oral, resp. rate 20, height 5' (1.524 m), weight 125 lb 14.1 oz (57.1 kg), SpO2 97%.  General: No acute distress.   HEENT: Moist mucous membranes.  Swelling of the right side of the nose with well-healing surgical incision from recent skin cancer removal surgery according to patient  Respiratory: Clear to auscultation bilaterally.  No wheezes. No rhonchi.  Cardiovascular: S1, S2.  Regular rate and rhythm.    Abdomen: Soft, nontender, nondistended.  Positive bowel sounds.   Neurologic: Awake alert, no focal neurological deficits.  Musculoskeletal: Full range of motion of all extremities.   Bilateral lower extremity pitting pedal edema  Psychiatric: Appropriate mood and affect.      Diagnostic Data:      Laboratory Data:   Lab Results   Component Value Date    WBC 4.5 06/16/2025    HGB 12.1 06/16/2025    HCT 37.5 06/16/2025    .0 06/16/2025    CREATSERUM 0.97 06/16/2025    BUN 20 06/16/2025     06/16/2025    K 4.0 06/16/2025     06/16/2025    CO2 29.0 06/16/2025     06/16/2025    CA 8.9 06/16/2025    ALB 3.3 06/16/2025    ALKPHO 136 06/16/2025    BILT 0.5 06/16/2025    TP 7.8 06/16/2025    AST 52 06/16/2025    ALT 31 06/16/2025    PTT 29.2 06/16/2025    INR 1.22 06/16/2025    PTP 15.2 06/16/2025       Recent Labs   Lab 06/16/25  1157   PTP 15.2*   INR 1.22*     Recent Labs   Lab 06/16/25  1157   TROPHS 17       Imaging:  Imaging data reviewed in Epic.  Chest x-ray done in ER on 6/16/2025 results as below   Bibasilar coarse interstitial opacities are redemonstrated, which may reflect patient's underlying fibrotic lung disease.  No new focal consolidation.  No pleural effusion.  Calcified left basilar and right basilar pleural plaques.  No   pneumothorax. Cardiomediastinal silhouette is stable.  Median sternotomy changes.  Vertebral augmentation changes.       Bilateral lower extremity venous duplex done in ER on 6/16/2025 showed bilateral Baker's cyst, but no signs of DVT.    ASSESSMENT / PLAN:     #Bilateral lower extremity pedal edema, mild elevated proBNP, rule out CHF  CHF protocol  IV Lasix  Daily weight  I/O chart  Check 2D echo  Albumin level normal at 3.3    #Hypertension  Continue home Coreg  Follow blood pressure    #CAD with prior CABG  Monitor on telemetry    #Hyperlipidemia  Continue home statin    #History of skin cancer with prior recent surgery on the face on the right nose    #Rheumatoid arthritis  On chart review, patient follows up with rheumatology Dr. Shai Valles MD as outpatient for this  Patient takes hydrochlorothiazide as outpatient for  this    #Interstitial lung disease possibly due to rheumatoid arthritis related interstitial lung disease according to rheumatologist note, also patient follows up with pulmonary Dr. Botello as outpatient per chart review    #Cirrhosis-patient follows up with hepatology Dr. Huffman as outpatient per chart review    # Incidental bilateral Baker's cyst seen in ultrasound done in ER-follow up with regular outpatient primary care physician or Ortho as outpatient for follow-up on this    # Mild hyponatremia-follow BMP in a.m.    # Mild elevated liver function tests AST and alkaline phosphatase-follow CMP    Quality:  DVT Prophylaxis: DVT Mechanical Prophylaxis:   SCDs,    DVT Pharmacologic Prophylaxis   Medication    enoxaparin (Lovenox) 40 MG/0.4ML SUBQ injection 40 mg                Melo: External urinary catheter in place      Plan of care discussed with patient, RN      Discussed with my colleague who received signout from [facility].  My colleague will follow from morning tomorrow    **Certification      PHYSICIAN Certification of Need for Inpatient Hospitalization - Initial Certification    Patient will require inpatient services that will reasonably be expected to span two midnight's based on the clinical documentation in H+P.   Based on patients current state of illness, I anticipate that, after discharge, patient will require TBD.      Bridgette Willett MD  6/16/2025  4:28 PM

## 2025-06-16 NOTE — HISTORICAL OFFICE NOTE
[facility]  [address] [address], [address], [address], [place] [address]  [phone]      Carlos Howell  Progress Note  Demographics:  Name: Carlos Howell YOB: 1940  Age: 82, Male Medical Record No: [id number]  Visited Date/Time: 09/08/2022 11:00 AM    Chief Complaints  Follow up CAD, hypercholesterolemia, carotid artery stenosis    History of Present Illness    Mr. Howell is a pleasant 82-year-old gentleman with a history of coronary disease as well as bypass surgery.  He has had a history of hypertension as well as dyslipidemia and does have rheumatoid arthritis.  Today he is mildly hypotensive and we will decrease his metoprolol 25 to 12.5 mg/day.  We will also have him hydrate with not just water but low calorie Gatorade.  Family states that he eats well.  He previously had Covid pneumonia but he has since recovered, although he does have a chronic cough.  He follows with pulmonary.  He currently denies any angina or heart failure symptoms.  No palpitations, syncope, orthopnea, PND or edema.  No recent history of bleeding.   Him and family want to hold off on stress testing for now but to continue a course of conservative medical management and supportive care.  Echocardiogram has minimally depressed left ventricular function and carotid ultrasound has mild disease.  He is asymptomatic.  Cardiac risk factors Dyslipidemia and Former smoker    Past Medical History  1.Carotid artery stenosis, bilateral  2.S/P CABG x 4  3.CAD (coronary artery disease)  4.Pure hypercholesterolemia  5.Palpitations    Past Surgical History  1.S/P CABG x 4  Family History  No significant family history noted.  Social History  Smoking status Former smoker  Tobacco usage - No (Current non-smoker (finding))  Review of systems  Cardiovascular No history of Chest pain, CLINTON, Palpitations, Syncope, PND, Orthopnea, Edema and Claudication  Musculoskeletal Arthralgias  and Muscle weakness  Respiratory Cough  Physical Examination  Vitals Left Arm Sitting BP 96 / 50 mmHg, Pulse rate 85 bpm, Height in 5' 1\", BMI: 28.3, Weight in 150 lbs (or) 68 kgs and BSA : 1.73 cc/m²  General Appearance No Acute Distress and Appropriate  Head/Eyes/Ears/Nose/Mouth/Throat Conjunctiva pink, Sclera Clear, Mucous membranes Moist and No pallor  Neck No carotid bruits and No JVD  Respiratory Lungs clear with normal breath sounds  Cardiovascular Intact distal pulses and Regular rhythm. Normal rate present. Normal and normal S1 and S2    Gastrointestinal Abdomen soft and Non-tender  Musculoskeletal Normal spine  Gait Normal gait  Strength and tone Normal muscle strength and Normal muscle tone  Upper Extremities No edema  Lower Extremities Pulses 2+ and equal bilaterally and No edema  Skin Warm and dry  Neurologic / Psychiatric Alert and Oriented  Speech Normal speech  Allergies  No medication allergies noted.  Medications (Info obtained by: Verbal)  1.aspirin 81 MG tablet, 1 daily  2.atorvastatin (LIPITOR) 20 MG tablet, TAKE 1 TABLET BY MOUTH AT BEDTIME  3.celecoxib (CELEBREX) 200 MG capsule, 1 daily  4.Cholecalciferol (VITAMIN D) 2000 units capsule, 1 daily  5.esomeprazole (NEXIUM) 20 MG capsule, Take 20 mg by mouth daily (before breakfast).  6.hydroxychloroquine (PLAQUENIL) 200 MG tablet, 1 daily  7.leflunomide 20 mg tablet, Take 1 tablet orally once a day.  8.metoPROLOL succinate (TOPROL-XL) 25 MG 24 hr tablet, 1 daily  Impression  1.H/O hypotension  2.Chronic cough  3.Carotid artery stenosis, bilateral  4.S/P CABG x 4  5.CAD (coronary artery disease)  6.Pure hypercholesterolemia  Assessment & Plan  Mr. Anamaria BECERRA is a pleasant 81-year-old gentleman with history of coronary disease and bypass.  Blood pressure is slightly low today and will be placing.  We want to respect him and family's wishes.  To 12.5 mg/day.  He should give us a call in the next couple weeks to see how his blood pressures are doing.   I would also recommend hydration with not just water but low calorie Gatorade.  He does have chronic cough and follows up with a minimally.  He currently denies any angina or heart failure symptoms.  Echo has mildly depressed left ventricular function.  Blood pressure is limiting guideline directed medical therapy but we are concentrating on conservative medical management and supportive care.  Family does not want to pursue any further stress testing.  They also going to be talking about possible DNR status.  They will let us know if there is any make a decision.  We want to respect him and family's wishes.  His next blood work will be in 6 months (CBC, CMP, fasting lipid panel).  We will continue to recommend a low-fat low-cholesterol diet as well as a regular exercise program as he is able to tolerate and will follow this very nice gentleman in office.  Return to clinic to see me in 6 months.  Labs and Diagnostics ordered  1.Lipid panel - Fasting (6 Months)  2.CBC (Complete Blood Count) (6 Months)  3.CMP (comprehensive metabolic panel) (6 Months)  Nurses documentation  Upcoming surgeries: none  Use of assistive devices(s): none  Triage & medication list reviewed by:   Refills completed: Reviewed with patient     Patient instructions  To 12.5 mg/day.  He should give us a call in the next couple weeks to see how his blood pressures are doing.  I would also recommend hydration with not just water but low calorie Gatorade.  He does have chronic cough and follows up with a minimally.  He currently denies any angina or heart failure symptoms.  Echo has mildly depressed left ventricular function.  Blood pressure is limiting guideline directed medical therapy but we are concentrating on conservative medical management and supportive care.  Family does not want to pursue any further stress testing.  They also going to be talking about possible DNR status.  They will let us know if there is any make a decision.  We want  to respect him and family's wishes.  His next blood work will be in 6 months (CBC, CMP, fasting lipid panel).  We will continue to recommend a low-fat low-cholesterol diet as well as a regular exercise program as he is able to tolerate and will follow this very nice gentleman in office.  Return to clinic to see me in 6 months.    CPOE Orders carried out by: Mar Poole CMA  Care Providers: Wilma Marcano MD and Mar Poole CMA  Electronically Authenticated by  Wilma Marcano MD  09/08/2022 11:51:53 AM  Disclaimer: Components of this note were documented using voice recognition system and are subject to errors not corrected at proofreading. Contact the author of this note for any clarifications.

## 2025-06-16 NOTE — PLAN OF CARE
ADMISSION NOTE    Patient admitted via EMS from [place] ED. Aox4, forgetful at times, currently denies pain. Sinus rhythm with 1st degree on tele, occasional PVCs. Lung sounds clear bilaterally. Pt ambulating with walker and steady gait at home per daughter. Oriented to room. Safety precautions initiated, explained importance of utilizing call light before getting out of bed, pt verbalized understanding. Two person skin check completed with Teresa PCT. Incision present to nose with stitches, recent skin cancer excision per daughter. Also sacrum and BL buttocks moist and fragile, open area, cleansed and mepilex applied.    Plan: IV lasix. Cardiology to see. Echo.     Call light in reach. Patient and daughter updated on plan of care and verbalized understanding. Fall precautions in place.    Problem: Patient/Family Goals  Goal: Patient/Family Long Term Goal  Description: Patient's Long Term Goal: Stay out of the hospital    Interventions:  - Take medications as ordered  - Attend follow up appointments  - See additional Care Plan goals for specific interventions  Outcome: Progressing  Goal: Patient/Family Short Term Goal  Description: Patient's Short Term Goal: Go home    Interventions:   - Labs, tele, consults  - Medications as ordered   - See additional Care Plan goals for specific interventions  Outcome: Progressing

## 2025-06-16 NOTE — ED INITIAL ASSESSMENT (HPI)
2 weeks ago started with swelling in both ankles.  Swelling has now advanced up to knees.  Denies sob

## 2025-06-16 NOTE — ED QUICK NOTES
Rounding Completed    Plan of Care reviewed. Waiting for bed assignment.   Elimination needs assessed.  Provided urinal for I & O's.    Bed is locked and in lowest position. Call light within reach.

## 2025-06-17 ENCOUNTER — APPOINTMENT (OUTPATIENT)
Dept: CV DIAGNOSTICS | Facility: HOSPITAL | Age: 85
End: 2025-06-17
Attending: INTERNAL MEDICINE
Payer: MEDICARE

## 2025-06-17 LAB
ALBUMIN SERPL-MCNC: 3 G/DL (ref 3.2–4.8)
ALBUMIN/GLOB SERPL: 0.7 {RATIO} (ref 1–2)
ALP LIVER SERPL-CCNC: 109 U/L (ref 45–117)
ALT SERPL-CCNC: 25 U/L (ref 10–49)
ANION GAP SERPL CALC-SCNC: 7 MMOL/L (ref 0–18)
AST SERPL-CCNC: 43 U/L (ref ?–34)
ATRIAL RATE: 65 BPM
BASOPHILS # BLD AUTO: 0.06 X10(3) UL (ref 0–0.2)
BASOPHILS NFR BLD AUTO: 1.8 %
BILIRUB SERPL-MCNC: 0.6 MG/DL (ref 0.2–1.1)
BUN BLD-MCNC: 17 MG/DL (ref 9–23)
CALCIUM BLD-MCNC: 8.9 MG/DL (ref 8.7–10.6)
CHLORIDE SERPL-SCNC: 100 MMOL/L (ref 98–112)
CO2 SERPL-SCNC: 30 MMOL/L (ref 21–32)
CREAT BLD-MCNC: 0.85 MG/DL (ref 0.7–1.3)
EGFRCR SERPLBLD CKD-EPI 2021: 86 ML/MIN/1.73M2 (ref 60–?)
EOSINOPHIL # BLD AUTO: 0.2 X10(3) UL (ref 0–0.7)
EOSINOPHIL NFR BLD AUTO: 5.9 %
ERYTHROCYTE [DISTWIDTH] IN BLOOD BY AUTOMATED COUNT: 15.5 %
GLOBULIN PLAS-MCNC: 4.2 G/DL (ref 2–3.5)
GLUCOSE BLD-MCNC: 88 MG/DL (ref 70–99)
HCT VFR BLD AUTO: 34.3 % (ref 39–53)
HGB BLD-MCNC: 11.1 G/DL (ref 13–17.5)
IMM GRANULOCYTES # BLD AUTO: 0.01 X10(3) UL (ref 0–1)
IMM GRANULOCYTES NFR BLD: 0.3 %
LYMPHOCYTES # BLD AUTO: 0.91 X10(3) UL (ref 1–4)
LYMPHOCYTES NFR BLD AUTO: 26.8 %
MAGNESIUM SERPL-MCNC: 1.6 MG/DL (ref 1.6–2.6)
MCH RBC QN AUTO: 27.4 PG (ref 26–34)
MCHC RBC AUTO-ENTMCNC: 32.4 G/DL (ref 31–37)
MCV RBC AUTO: 84.7 FL (ref 80–100)
MONOCYTES # BLD AUTO: 0.56 X10(3) UL (ref 0.1–1)
MONOCYTES NFR BLD AUTO: 16.5 %
NEUTROPHILS # BLD AUTO: 1.65 X10 (3) UL (ref 1.5–7.7)
NEUTROPHILS # BLD AUTO: 1.65 X10(3) UL (ref 1.5–7.7)
NEUTROPHILS NFR BLD AUTO: 48.7 %
OSMOLALITY SERPL CALC.SUM OF ELEC: 285 MOSM/KG (ref 275–295)
P AXIS: -4 DEGREES
P-R INTERVAL: 270 MS
PLATELET # BLD AUTO: 126 10(3)UL (ref 150–450)
PLATELETS.RETICULATED NFR BLD AUTO: 2 % (ref 0–7)
POTASSIUM SERPL-SCNC: 3.7 MMOL/L (ref 3.5–5.1)
PROT SERPL-MCNC: 7.2 G/DL (ref 5.7–8.2)
Q-T INTERVAL: 446 MS
QRS DURATION: 140 MS
QTC CALCULATION (BEZET): 463 MS
R AXIS: -34 DEGREES
RBC # BLD AUTO: 4.05 X10(6)UL (ref 3.8–5.8)
SODIUM SERPL-SCNC: 137 MMOL/L (ref 136–145)
T AXIS: 55 DEGREES
VENTRICULAR RATE: 65 BPM
WBC # BLD AUTO: 3.4 X10(3) UL (ref 4–11)

## 2025-06-17 PROCEDURE — 93306 TTE W/DOPPLER COMPLETE: CPT | Performed by: INTERNAL MEDICINE

## 2025-06-17 PROCEDURE — 99232 SBSQ HOSP IP/OBS MODERATE 35: CPT | Performed by: HOSPITALIST

## 2025-06-17 RX ORDER — MAGNESIUM OXIDE 400 MG/1
400 TABLET ORAL ONCE
Status: COMPLETED | OUTPATIENT
Start: 2025-06-17 | End: 2025-06-17

## 2025-06-17 RX ORDER — POTASSIUM CHLORIDE 1.5 G/1.58G
40 POWDER, FOR SOLUTION ORAL ONCE
Status: COMPLETED | OUTPATIENT
Start: 2025-06-17 | End: 2025-06-17

## 2025-06-17 RX ORDER — KETOROLAC TROMETHAMINE 15 MG/ML
15 INJECTION, SOLUTION INTRAMUSCULAR; INTRAVENOUS EVERY 6 HOURS
Status: DISCONTINUED | OUTPATIENT
Start: 2025-06-17 | End: 2025-06-18

## 2025-06-17 NOTE — OCCUPATIONAL THERAPY NOTE
OCCUPATIONAL THERAPY EVALUATION - INPATIENT     Room Number: 8601/8601-A  Evaluation Date: 6/17/2025  Type of Evaluation: Initial  Presenting Problem: leg swelling    Physician Order: IP Consult to Occupational Therapy  Reason for Therapy: ADL/IADL Dysfunction and Discharge Planning    OCCUPATIONAL THERAPY ASSESSMENT   Patient is currently functioning below baseline with toileting, bathing, lower body dressing, transfers, static standing balance, dynamic standing balance, and energy conservation strategies. Prior to admission, patient's baseline is uses RW, walks short distances, family assists with showers, tub transfers, and dressing.  Patient is requiring min to max assist as a result of the following impairments: decreased functional reach, decreased endurance, impaired standing balance, decreased muscular endurance, and chronic joint stiffness. Occupational Therapy will continue to follow for duration of hospitalization.    Patient will benefit from continued skilled OT Services at discharge to promote prior level of function.  Anticipate patient will return home with home health OT    History Related to Current Admission: Patient is a 85 year old male admitted on 6/16/2025 with Presenting Problem: leg swelling. Co-Morbidities : CAD, CABG, alcoholic cirrhosis, RA, CHF    WEIGHT BEARING RESTRICTION       Recommendations for nursing staff:   Transfers: 1 person, RW  Toileting location: bathroom     EVALUATION SESSION:  Patient Start of Session: supine    FUNCTIONAL TRANSFER ASSESSMENT  Sit to Stand: Edge of Bed  Edge of Bed: Minimal Assist    BED MOBILITY  Supine to Sit : Moderate Assist    BALANCE ASSESSMENT     FUNCTIONAL ADL ASSESSMENT  LB Dressing Seated: Maximum Assist    ACTIVITY TOLERANCE: denied shortness of breath or dizziness                         O2 SATURATIONS  Oxygen Therapy  SPO2% on Room Air at Rest: 93    COGNITION  Arousal/Alertness:  appropriate responses to stimuli  Attention Span:  appears  intact  Orientation Level:  oriented to place, oriented to time, and oriented to person  Following Commands:  follows one step commands with increased time  Initiation: appears intact  Awareness of Deficits:  fully aware of deficits    Upper Extremity   ROM: intact for BADLs  Strength: within functional limits   Coordination  Gross motor: wfl  Fine motor: wfl  Sensation: no deficits noted or reported    EDUCATION PROVIDED  Patient Education : Role of Occupational Therapy; Plan of Care; Functional Transfer Techniques  Patient's Response to Education: Verbalized Understanding    Equipment used: rw, gait belt  Demonstrates functional use, Would benefit from additional trial yes     Therapist comments: Patient assisted with MAX A for trunk and MOD A for LEs for supine to sit. Min A to stand at RW. Patient reported that he cannot walk long distances at baseline. He completed functional mobility using RW for simulated bathroom distances at home with CGA and moderate reliance on RW. Patient assisted back in to bed with MOD A for LEs. DEP A to boost up in bed. Patient left with alarm on and needs met. RN updated.     Patient End of Session: Hospital anti-slip socks, All patient questions and concerns addressed, RN aware of session/findings, Call light within reach, Needs met, In bed, Alarm set    OCCUPATIONAL PROFILE    HOME SITUATION  Type of Home: House  Home Layout: Able to live on main level  Lives With: Spouse, Daughter    Toilet and Equipment: Standard height toilet  Shower/Tub and Equipment: Tub-shower combo       Occupation/Status: retired     Drives: No  Patient Regularly Uses: Rolling walker, Wheelchair    Prior Level of Function: Lives with spouse and daughter, uses RW for short distances. Family assists with tub transfers, bathing, and dressing.    SUBJECTIVE   \"I have a beautiful daughter.\"    PAIN ASSESSMENT  Ratin          OBJECTIVE  Precautions: Bed/chair alarm, Hard of hearing  Fall Risk: High fall  risk    ASSESSMENTS    AM-PAC ‘6-Clicks’ Inpatient Daily Activity Short Form  -   Putting on and taking off regular lower body clothing?: A Lot  -   Bathing (including washing, rinsing, drying)?: A Lot  -   Toileting, which includes using toilet, bedpan or urinal? : A Lot  -   Putting on and taking off regular upper body clothing?: A Little  -   Taking care of personal grooming such as brushing teeth?: None  -   Eating meals?: None    AM-PAC Score:  Score: 17  Approx Degree of Impairment: 50.11%  Standardized Score (AM-PAC Scale): 37.26    ADDITIONAL TESTS     NEUROLOGICAL FINDINGS      COGNITION ASSESSMENTS     PLAN  OT Device Recommendations: Grab bars, Transfer tub bench  OT Treatment Plan: Endurance training, Functional transfer training, Energy conservation/work simplification techniques, ADL training, Patient/Family training, Equipment eval/education, Patient/Family education, Compensatory technique education, Continued evaluation  Rehab Potential : Fair  Frequency: 3-5x/week  Number of Visits to Meet Established Goals: 5    ADL Goals   Patient will perform grooming: with stand by assist and while standing at sink  Patient will perform toileting: with stand by assist    Functional Transfer Goals  Patient will transfer from sit to supine:  with min assist  Patient will transfer from supine to sit:  with min assist  Patient will transfer from sit to stand:  with stand by assist  Patient will transfer to toilet:  with supervision    Patient Evaluation Complexity Level:   Occupational Profile/Medical History LOW - Brief history including review of medical or therapy records    Specific performance deficits impacting engagement in ADL/IADL LOW  1 - 3 performance deficits    Client Assessment/Performance Deficits MODERATE - Comorbidities and min to mod modifications of tasks    Clinical Decision Making MODERATE - Analysis of occupational profile, detailed assessments, several treatment options    Overall Complexity  LOW     OT Session Time: 15 minutes    Therapeutic Activity: 12 minutes

## 2025-06-17 NOTE — CONSULTS
Heart Failure Nurse  Progress Note    Patient was evaluated by the Heart Failure Nurse  for understanding, verbalization, demonstration, and recall of education related to heart failure, overall adherence to the behaviors necessary to maintain a compensated status, and risk for readmission.     Patient assessment:Heart failure (HF) education was provided to Mona, the daughter of a patient residing in [place]. Mona lives in [place] and manages all medical matters for her parents. Her father is under the care of Dr. Marcano and resides with her sisters in [place]. Comprehensive HF education was delivered to Mona to support her in her caregiving role.    Patient is able to verbalize signs/symptoms fluid overload/impending HF exacerbation and who to contact with problems                                          _x__ yes  ___ no      Patient is following a 2000 mg sodium diet                                             ___ yes  __x_ no    If no, barriers to 2000 mg sodium diet: new education    Patient informed of 2-Part dietician classes that is free if sign up within 30 days of discharge or $40  __x_ yes  ___ no      Patient is adherent to medication regimen                                              _x__ yes  ___ no    If no, barriers to medication regimen:    Patient has sufficient funds to purchase medication                      _x__ yes  ___ no      Patient has a scale in the home              x___ yes  ___ no      Patient is adherent to daily weight monitoring                                        _x__ yes   ___ no    If no, barriers to daily weight monitoring:    Symptom Tracker Worksheet reviewed with patient  __x_ yes   ___ no      Patient verbalizes understanding of stoplight/heart failure zones          _x__ yes   ___ no      Patient understands the importance of 7-day follow-up appointment      _x__ yes  ___ no    Appointment Date: requested          Patient has adequate  transportation to attend follow-up appointments    _x__ yes  ___ no    If no, was referral to Social Work made  ___yes  __x_ no      Family/Friend present during education: daughter, Mona (on the phone)     Additional consultations required: None    Cori MOREN, RN, PCCN     [id number]

## 2025-06-17 NOTE — CONSULTS
[facility]  Report of Inpatient Wound Care Consultation    Carlos Howell Patient Status:  Inpatient    3/26/1940 MRN [id number]   Location [facility] 8NE-A Attending Abdullahi Severino MD   Hosp Day # 1 PCP Simba Nunez MD     Reason for Consultation:    Admit with 2 year old sacral/ buttock wound    History of Present Illness:  Carlos Howell is a a(n) 85 year old male.  Patient seen at bedside with a fellow wound care staff.Sacrum intact at this time but open area noted more on the left buttocks.Denies pain at this time.Patient presents with Patient with multiple comorbidities.          History:  Past Medical History[1]  Past Surgical History[2]   reports that he quit smoking about 20 years ago. His smoking use included cigarettes. He started smoking about 69 years ago. He has a 50 pack-year smoking history. He has been exposed to tobacco smoke. He has never used smokeless tobacco. He reports that he does not currently use alcohol. He reports that he does not use drugs.      Allergies:  @ALLERGY    Laboratory Data:    Recent Labs   Lab 25  1157 25  0606   WBC 4.5 3.4*   HGB 12.1* 11.1*   HCT 37.5* 34.3*   .0 126.0*   CREATSERUM 0.97 0.85   BUN 20 17   * 88   CA 8.9 8.9   ALB 3.3 3.0*   TP 7.8 7.2   PTT 29.2  --    INR 1.22*  --          ASSESSMENT:  Wound 24 Buttocks Left (Active)   Date First Assessed/Time First Assessed: 24 1832   Present on Original Admission: Yes  Primary Wound Type: Pressure Injury  Location: Buttocks  Wound Location Orientation: Left      Assessments 2025  9:36 AM   Wound Image      Drainage Amount None   Wound Length (cm) 0.5 cm   Wound Width (cm) 0.5 cm   Wound Surface Area (cm^2) 0.2 cm^2   Wound Depth (cm) 0.1 cm   Wound Volume (cm^3) 0.013 cm^3   Margins Well-defined edges   Non-staged Wound Description Full thickness   Ashley-wound Assessment Intact   Wound Granulation Tissue Pink;Pale Carlos   Wound Bed  Granulation (%) 75 %   Wound Bed Slough (%) 25 %   Wound Odor None   Pressure Injury Stage Unstageable            Wound Cleaning and Dressings:  Wound cleansing:  Cleanse with saline  Wound product: Honey gel and Bordered foam  Dressing change frequency:  Change dressing daily and/or PRN        Miscellaneous/Additional Orders:  Offloading: Turn every 2 hours and as needed- as tolerated,low air loss air mattress, waffle cushion for the chair, off load heels while in bed to prevent further skin breakdown    To maintain pressure off loading, keep the head of the bed in at least 30 degree angle or less  except when  eating meals ,if  patient is having shortness of breath or difficulty of breathing. or any other pre/post  surgical procedure that may require a certain position.    May utilize pillows or wedges  for repositioning      Miscellaneous orders: Increase dietary protein intake.Dietitian or Attending physician may need to evaluate amount of protein intake/supplements depending on the kidney functions and other medical conditions     Care Summary:  Care Summary: Discussed Plan of Care at beside with patient. Patient verbally acknowledges understanding of all instructions and all questions were answered.      Recommendations:  May need to follow up with home health care discharged to  home      Thank you for this consultation and for allowing me to participate in the care of your patient.      Time Spent 30 Minutes.    Thank you,  Keli Howard RN BSN [facility] Wound Care Team  6/17/2025  9:52 AM         [1]   Past Medical History:   Anesthesia complication    post-op hiccups    Arthritis    CAD (coronary artery disease)    s/p CABG    Cancer (HCC)    skin    Current episode of major depressive disorder without prior episode    Disorder of liver    cirrhosis    Esophageal reflux    Hearing impairment    no hearing aids    High blood pressure    High cholesterol    History of 2019 novel  coronavirus disease (COVID-19)    HTN (hypertension)    Osteoarthritis    RA in all joints    Rheumatoid arthritis (HCC)    Visual impairment   [2]   Past Surgical History:  Procedure Laterality Date    Appendectomy      Bypass surgery      Cabg      x4 vessels    Repair ing hernia,5+y/o,reducibl      Skin tissue procedure unlisted

## 2025-06-17 NOTE — PHYSICAL THERAPY NOTE
PHYSICAL THERAPY EVALUATION - INPATIENT     Room Number: 8601/8601-A  Evaluation Date: 6/17/2025  Type of Evaluation: Initial  Physician Order: PT Eval and Treat    Presenting Problem: LE edema > CHF  Co-Morbidities : CAD, CABG, alcoholic cirrhosis, RA, CHF  Reason for Therapy: Mobility Dysfunction and Discharge Planning    PHYSICAL THERAPY ASSESSMENT   Patient is a 85 year old male admitted 6/16/2025 for LE edema.  Prior to admission, patient's baseline is mod I for short distances with RW.  Patient is currently functioning near baseline with bed mobility, transfers, and gait.  Patient is requiring contact guard assist and minimal assist as a result of the following impairments: decreased functional strength, decreased endurance/aerobic capacity, decreased muscular endurance, and medical status.  Physical Therapy will continue to follow for duration of hospitalization.    Patient will benefit from continued skilled PT Services at discharge to promote prior level of function.  Anticipate patient will return home with home health PT.    PLAN DURING HOSPITALIZATION  Nursing Mobility Recommendation : 1 Assist  PT Device Recommendation: Rolling walker  PT Treatment Plan: Bed mobility, Body mechanics, Coordination, Endurance, Energy conservation, Patient education, Family education, Gait training, Range of motion, Neuromuscular re-educate, Strengthening, Transfer training, Balance training  Rehab Potential : Good  Frequency (Obs): 3-5x/week     CURRENT GOALS    Goal #1 Patient is able to demonstrate supine - sit EOB @ level: supervision     Goal #2 Patient is able to demonstrate transfers EOB to/from Chair/Wheelchair at assistance level: supervision     Goal #3 Patient is able to ambulate 50 feet with assist device: walker - rolling at assistance level: supervision     Goal #4    Goal #5    Goal #6    Goal Comments: Goals established on 6/17/2025      PHYSICAL THERAPY MEDICAL/SOCIAL HISTORY  History related to current  admission: Patient is a 85 year old male admitted on 2025: Presented with BLE edema (US w/ bakers cyst, (-) DVT). Dx CHF    HOME SITUATION  Type of Home: House  Home Layout: Able to live on main level  Stairs to Enter : 1   Railing: No    Stairs to Bedroom: 0         Lives With: Spouse, Daughter    Drives: No   Patient Regularly Uses: Rolling walker, Wheelchair      Prior Level of [place]:   Ambulates short distances <> bathroom with RW, uses w/c for longer distances, denies any falls in the past 6 months, spouses assists with ADLs and occasionally bed mobility, a family member is usually with him when he is walking     SUBJECTIVE    \" I have a beautiful dtr\"     OBJECTIVE  Precautions: Bed/chair alarm  Fall Risk: High fall risk    WEIGHT BEARING RESTRICTION     PAIN ASSESSMENT  Ratin  Location: pt did not c.o pain       COGNITION  Overall Cognitive Status:  WFL - within functional limits    RANGE OF MOTION AND STRENGTH ASSESSMENT  Upper extremity ROM and strength are within functional limits     Lower extremity ROM is within functional limits     Lower extremity strength is within functional limits     BALANCE  Static Sitting: Good  Dynamic Sitting: Good  Static Standing: Fair -  Dynamic Standing: Fair -    ADDITIONAL TESTS                                    ACTIVITY TOLERANCE                         O2 WALK       NEUROLOGICAL FINDINGS                        AM-PAC '6-Clicks' INPATIENT SHORT FORM - BASIC MOBILITY  How much difficulty does the patient currently have...  Patient Difficulty: Turning over in bed (including adjusting bedclothes, sheets and blankets)?: None   Patient Difficulty: Sitting down on and standing up from a chair with arms (e.g., wheelchair, bedside commode, etc.): A Little   Patient Difficulty: Moving from lying on back to sitting on the side of the bed?: None   How much help from another person does the patient currently need...   Help from Another: Moving to and from a bed to a  chair (including a wheelchair)?: A Little   Help from Another: Need to walk in hospital room?: A Little   Help from Another: Climbing 3-5 steps with a railing?: A Lot     AM-PAC Score:  Raw Score: 19   Approx Degree of Impairment: 41.77%   Standardized Score (AM-PAC Scale): 45.44   CMS Modifier (G-Code): CK    FUNCTIONAL ABILITY STATUS  Gait Assessment   Functional Mobility/Gait Assessment  Gait Assistance: Contact guard assist  Distance (ft): 25  Assistive Device: Rolling walker  Pattern: Shuffle    Skilled Therapy Provided     Bed Mobility:  Rolling: min A   Supine to sit: min A  - vc for log roll technique   Sit to supine: min A      Transfer Mobility:  Sit to stand: CGA   Stand to sit: CGA  Gait = CGA    Therapist's Comments: Discussed case with RN prior to session initiation. Pt agreeable to participation in therapy. Gait belt donned for out of bed mobility. pt educated on energy conservation techniques including slower gait speed, standing/seated rest breaks, pacing activities, and continued use of assistive device. Encouraged ambulating <> bathroom and short distances in [place] with staff as appropriate TID.         Exercise/Education Provided:  Bed mobility  Body mechanics  Energy conservation  Functional activity tolerated  Gait training  Transfer training    Patient End of Session: Up in chair, Needs met, Call light within reach, RN aware of session/findings, All patient questions and concerns addressed, Hospital anti-slip socks, Alarm set      Patient Evaluation Complexity Level:  History Low - no personal factors and/or co-morbidities   Examination of body systems Low -  addressing 1-2 elements   Clinical Presentation Low- Stable   Clinical Decision Making Low Complexity       PT Session Time: 20 minutes  Gait Training: 10 minutes  Therapeutic Activity:  minutes  Neuromuscular Re-education:  minutes  Therapeutic Exercise:  minutes

## 2025-06-17 NOTE — PLAN OF CARE
Assumed care of patient @ 0730 patient resting in bed, AOX3-4, reports no pain. Lung sounds diminished bilaterally, O2 saturation adequate on room air. No complaints of shortness of breath or chest pain at this time. Sinus rhythm 1st degree on tele on tele, S1-S2 present, no adventitious sounds noted. Bowel sounds present and active in all quadrants, last bowel movement today. Patient voiding with increased frequency. Pt up to chair today with two assist and walker. Sutures to right side of nose, cleansed and Vaseline applied. Right and left buttocks with unstageable pressure injuries per wound care. +1 pitting edema to BLE.     Plan of Care: IV lasix. Daily weights. I/O. PT/OT to see. Possible transition to PO lasix tomorrow.     Discussed plan of care with patient, daughter Henna via telephone verbalized understanding. Call light within reach.     Problem: Patient/Family Goals  Goal: Patient/Family Long Term Goal  Description: Patient's Long Term Goal: Stay out of the hospital    Interventions:  - Take medications as ordered  - Attend follow up appointments  - See additional Care Plan goals for specific interventions  Outcome: Progressing  Goal: Patient/Family Short Term Goal  Description: Patient's Short Term Goal: Go home    Interventions:   - Labs, tele, consults  - Medications as ordered   - See additional Care Plan goals for specific interventions  Outcome: Progressing

## 2025-06-17 NOTE — PLAN OF CARE
Assumed care of patient @ 11:00. Pt AOx3, bed and chair alarm in place for safety. O2 sats maintained on RA, denies SOB. NSR with 1st AVB. Pt c/o RA pain bilaterally, IV toradol with relief. Echo completed. Plan for diuresis. Pt updated on plan of care.     Problem: Patient/Family Goals  Goal: Patient/Family Long Term Goal  Description: Patient's Long Term Goal: Stay out of the hospital    Interventions:  - Take medications as ordered  - Attend follow up appointments  - See additional Care Plan goals for specific interventions  Outcome: Progressing  Goal: Patient/Family Short Term Goal  Description: Patient's Short Term Goal: Go home    Interventions:   - Labs, tele, consults  - Medications as ordered   - See additional Care Plan goals for specific interventions  Outcome: Progressing

## 2025-06-17 NOTE — PROGRESS NOTES
[facility]   part of [facility]     Hospitalist Progress Note     Carlos Howell Patient Status:  Inpatient    3/26/1940 MRN [id number]   Location [facility] 8NE-A Attending Abdullahi Severino MD   Hosp Day # 1 PCP Simba Nunez MD     Subjective:   Diffuse pain with his RA not feeling well. Still swollen but better     Objective:    Review of Systems:   A comprehensive review of systems was completed; pertinent positive and negatives stated in subjective.  Vital signs:  Temp:  [97.3 °F (36.3 °C)-98.8 °F (37.1 °C)] 97.6 °F (36.4 °C)  Pulse:  [69-88] 69  Resp:  [16-20] 18  BP: ()/(41-73) 100/55  SpO2:  [90 %-100 %] 95 %  Physical Exam:    General: No acute distress    Respiratory: no wheezes, no rhonchi  Cardiovascular: S1, S2, RRR  Abdomen: Soft, NT/ND, +BS  Extremities: + edema, limited mobility 2/2 RA pain     Diagnostic Data:    Labs:  Recent Labs   Lab 25  1157 25  0606   WBC 4.5 3.4*   HGB 12.1* 11.1*   MCV 87.4 84.7   .0 126.0*   INR 1.22*  --      Recent Labs   Lab 25  1157 25  0606   * 88   BUN 20 17   CREATSERUM 0.97 0.85   CA 8.9 8.9   ALB 3.3 3.0*   * 137   K 4.0 3.7    100   CO2 29.0 30.0   ALKPHO 136* 109   AST 52* 43*   ALT 31 25   BILT 0.5 0.6   TP 7.8 7.2     Estimated Creatinine Clearance: 45.8 mL/min (based on SCr of 0.85 mg/dL).  Recent Labs   Lab 25  1157   PTP 15.2*   INR 1.22*        Microbiology  No results found for this visit on 25.  Imaging: Reviewed in Epic.  Medications: Scheduled Medications[1]    Assessment & Plan:    #Suspected acute CHF, LE swelling, BNP elevation  -diurese  -I/Os, weights - losing >6lbs already   -ECHO  -Cardio consulted    #CAD s/p CABG- low suspicion for ACS  #Cirrhosis with elevated LFTs- trend improving as possible congestion from above  #Pancytopenia suspect 2/2 cirrhosis- monitor   #Hyponatremia- improved with diuresis    #HTN  #DL  #RA- in pain - add toradol for  the next 24 hours to see if feels better, on plaquenil   #ILD  #Incidental baker's cyst- outpt f/u          Supplementary Documentation:   Quality:  DVT Mechanical Prophylaxis:   SCDs,    DVT Pharmacologic Prophylaxis   Medication    enoxaparin (Lovenox) 40 MG/0.4ML SUBQ injection 40 mg                Code Status: Not on file  Melo: External urinary catheter in place  Melo Duration (in days):   Central line:    ROSA:   At this point Mr. Howell is expected to be discharge to: d     The 21st Century Cures Act makes medical notes like these available to patients in the interest of transparency. Please be advised this is a medical document. Medical documents are intended to carry relevant information, facts as evident, and the clinical opinion of the practitioner. The medical note is intended as peer to peer communication and may appear blunt or direct. It is written in medical language and may contain abbreviations or verbiage that are unfamiliar.                [1]    enoxaparin  40 mg Subcutaneous Daily    furosemide  20 mg Intravenous BID (Diuretic)    rosuvastatin  5 mg Oral Nightly    hydroxychloroquine  200 mg Oral BID    fluticasone-salmeterol  1 puff Inhalation BID    carvedilol  3.125 mg Oral BID with meals

## 2025-06-17 NOTE — CONSULTS
[facility]  Consultation    Carlos Howell Patient Status:  Inpatient    3/26/1940 MRN [id number]   Location [facility] 8NE-A Attending Bridgette Willett MD   Hosp Day # 0 PCP Simba Nunez MD       Reason for consultation;  CHF     HPI:  This is an 85 year old male patient with PMH of CAD s/p CABG, HTN, DLP, RA who presented with LE edema. He felt his ankles were getting more and more swollen at least the past week. He denies any other symptoms. No CLINTON, orthopnea or CP.     MDM / Diagnostics reviewed & interpreted:  ECG w/ NSR, first degree AVB, IVCD/atypical LBBB, NSST  Trp negative  Pro-    Assessment:    CHF  CAD s/p CABG  HTN  DLP    Plan:  -LE edema without signs of central congestion. Given Hx, suspect CHF. Increase lasix to 20mg IV BID  -TTE  -continue home cardiac regimen    Discussed with patient.     Please call with questions. Thank you for your consult.    Level of care: C4      Jacob Siddiqi MD  Interventional Cardiology  [facility]    --------------------------------------------------------------------------------------------------------------------------------  ROS 10 systems reviewed, pertinent findings above.    History:  Past Medical History[1]  Past Surgical History[2]  Family History[3]   reports that he quit smoking about 20 years ago. His smoking use included cigarettes. He started smoking about 69 years ago. He has a 50 pack-year smoking history. He has been exposed to tobacco smoke. He has never used smokeless tobacco. He reports that he does not currently use alcohol. He reports that he does not use drugs.    Objective:   Temp: 98.2 °F (36.8 °C)  Pulse: 83  Resp: 18  BP: 109/56    Intake/Output:     Intake/Output Summary (Last 24 hours) at 2025  Last data filed at 2025 193  Gross per 24 hour   Intake 120 ml   Output 2100 ml   Net -1980 ml       Physical Exam:     General: NAD. Conversant.   HEENT: Normocephalic,  atraumatic.  Neck: Supple.  Cardiac: RRR. Normal S1, S2 . No murmur.  Lungs: Diminished, no wheezing.  GI: Soft, non-distended  Extremities: Rt radial pulses 2+. LE edema 2+.  Skin: Warm and dry.      Jacob Siddiqi MD  6/16/2025  9:00 PM           [1]   Past Medical History:   Anesthesia complication    post-op hiccups    Arthritis    CAD (coronary artery disease)    s/p CABG    Cancer (HCC)    skin    Current episode of major depressive disorder without prior episode    Disorder of liver    cirrhosis    Esophageal reflux    Hearing impairment    no hearing aids    High blood pressure    High cholesterol    History of 2019 novel coronavirus disease (COVID-19)    HTN (hypertension)    Osteoarthritis    RA in all joints    Rheumatoid arthritis (HCC)    Visual impairment   [2]   Past Surgical History:  Procedure Laterality Date    Appendectomy      Bypass surgery      Cabg      x4 vessels    Repair ing hernia,5+y/o,reducibl      Skin tissue procedure unlisted     [3]   Family History  Problem Relation Age of Onset    Other (Other) Father

## 2025-06-17 NOTE — PROGRESS NOTES
Progress Note  Carlos Howell Patient Status:  Inpatient    3/26/1940 MRN [id number]   Location [facility] 8NE-A Attending Abdullahi Severino MD   Hosp Day # 1 PCP Simba Nunez MD     Subjective:  No cardiac events over night. Resting in bed. [place].     Objective:  /58 (BP Location: Left arm)   Pulse 75   Temp 97.8 °F (36.6 °C) (Oral)   Resp 18   Ht 5' (1.524 m)   Wt 119 lb 12.8 oz (54.3 kg)   SpO2 95%   BMI 23.40 kg/m²     Telemetry: SR 75 BPM.      Intake/Output:    Intake/Output Summary (Last 24 hours) at 2025 0836  Last data filed at 2025 0443  Gross per 24 hour   Intake 170 ml   Output 2600 ml   Net -2430 ml       Last 3 Weights   25 0443 119 lb 12.8 oz (54.3 kg)   25 1613 125 lb 14.1 oz (57.1 kg)   25 1032 130 lb (59 kg)   25 0940 126 lb (57.2 kg)   24 1031 131 lb (59.4 kg)       Labs:  Recent Labs   Lab 25  1157 25  0606   * 88   BUN 20 17   CREATSERUM 0.97 0.85   EGFRCR 77 86   CA 8.9 8.9   * 137   K 4.0 3.7    100   CO2 29.0 30.0     Recent Labs   Lab 25  1157 25  0606   RBC 4.29 4.05   HGB 12.1* 11.1*   HCT 37.5* 34.3*   MCV 87.4 84.7   MCH 28.2 27.4   MCHC 32.3 32.4   RDW 15.7 15.5   NEPRELIM 2.19 1.65   WBC 4.5 3.4*   .0 126.0*         Recent Labs   Lab 25  1157   TROPHS 17   25:  EKG:  SR LBBB HR: 65 BPM, QRS:  140 ms, QTc:  463 ms, NV: 270 ms.   25:  Echo:  Pending.     Review of Systems:   Constitutional: No fevers, chills, fatigue or night sweats.  ENT: No mouth pain, neck pain, running nose, headaches or swollen glands.  Skin: No rashes, pruritus or skin changes,  Respiratory: Denies cough, wheezing or shortness of breath.  CV: Denies chest pain, palpitations, orthopnea, PND or dizziness.  Musculoskeletal: No joint pain, stiffness or swelling.  GI: No nausea, vomiting or diarrhea. No blood in stools.  Neurologic: No seizures, tremors, weakness or numbness.      Physical Exam:  Gen: alert, oriented x 3, NAD  Heent: pupils equal, reactive. Mucous membranes moist.   Neck: no jvd  Cardiac: regular rate and rhythm, normal S1,S2, no murmur, gallop or rub.   Lungs: CTA  Abd: soft, NT/ND +bs  Ext:+1-LE edema  Skin: Warm, dry  Neuro: No focal deficits    Medications:    Scheduled Medications[1]  Medication Infusions[2]    Assessment:  Acute on chronic HFmEF:   - LVEF: 40-45 %.   Adm BNP: 513.   Wt. Down overall at lest 8 Lbs, Total net ne.4 liters.   Lasix 20 mg IV Bid, Coreg 3.125 mg Bid  CAD:  Hx CABG.  NO anginal symptoms.   Chronic LBBB.   HTN: Controlled.   Dyslipidemia: Crestor 5 mg every day> HDL 40  LDL 46.   RA: Plaquenil: Possible ILD. Sees Dr. Ivan.   Cirrhosis    Plan:  Recent HFmEF- Good IV diuresis. Change to po tomorrow.   Echo pending.   Renal function normal.     Plan of care discussed with patient, RN.    NEELIMA Olivares  2025  8:36 AM       Note reviewed and labs reviewed.  Agree to the assessment and plan with the following additions/changes.  Entire medical decision making & plan performed by myself.      A/P:  Acute HFmrEF, predominantly LE edema, responding to diuresis  Continue IV lasix, reassess clinically tomorrow    LOC L2    Jacob Siddiqi MD  2025  Interventional Cardiology  [facility]  =======================================================           [1]    magnesium oxide  400 mg Oral Once    potassium chloride  40 mEq Oral Once    enoxaparin  40 mg Subcutaneous Daily    furosemide  20 mg Intravenous BID (Diuretic)    rosuvastatin  5 mg Oral Nightly    hydroxychloroquine  200 mg Oral BID    fluticasone-salmeterol  1 puff Inhalation BID    carvedilol  3.125 mg Oral BID with meals   [2]

## 2025-06-18 VITALS
BODY MASS INDEX: 23.16 KG/M2 | OXYGEN SATURATION: 96 % | RESPIRATION RATE: 18 BRPM | TEMPERATURE: 98 F | HEIGHT: 60 IN | DIASTOLIC BLOOD PRESSURE: 84 MMHG | SYSTOLIC BLOOD PRESSURE: 113 MMHG | HEART RATE: 67 BPM | WEIGHT: 118 LBS

## 2025-06-18 LAB
ANION GAP SERPL CALC-SCNC: 8 MMOL/L (ref 0–18)
BUN BLD-MCNC: 23 MG/DL (ref 9–23)
CALCIUM BLD-MCNC: 9 MG/DL (ref 8.7–10.6)
CHLORIDE SERPL-SCNC: 98 MMOL/L (ref 98–112)
CO2 SERPL-SCNC: 30 MMOL/L (ref 21–32)
CREAT BLD-MCNC: 1.07 MG/DL (ref 0.7–1.3)
EGFRCR SERPLBLD CKD-EPI 2021: 68 ML/MIN/1.73M2 (ref 60–?)
GLUCOSE BLD-MCNC: 83 MG/DL (ref 70–99)
MAGNESIUM SERPL-MCNC: 1.8 MG/DL (ref 1.6–2.6)
NT-PROBNP SERPL-MCNC: 506 PG/ML (ref ?–450)
OSMOLALITY SERPL CALC.SUM OF ELEC: 285 MOSM/KG (ref 275–295)
POTASSIUM SERPL-SCNC: 4.3 MMOL/L (ref 3.5–5.1)
POTASSIUM SERPL-SCNC: 4.3 MMOL/L (ref 3.5–5.1)
SODIUM SERPL-SCNC: 136 MMOL/L (ref 136–145)

## 2025-06-18 PROCEDURE — 99239 HOSP IP/OBS DSCHRG MGMT >30: CPT | Performed by: HOSPITALIST

## 2025-06-18 RX ORDER — CARVEDILOL 3.12 MG/1
3.12 TABLET ORAL 2 TIMES DAILY WITH MEALS
Status: DISCONTINUED | OUTPATIENT
Start: 2025-06-18 | End: 2025-06-18

## 2025-06-18 RX ORDER — MAGNESIUM OXIDE 400 MG/1
400 TABLET ORAL ONCE
Status: COMPLETED | OUTPATIENT
Start: 2025-06-18 | End: 2025-06-18

## 2025-06-18 RX ORDER — FUROSEMIDE 20 MG/1
20 TABLET ORAL DAILY
Qty: 30 TABLET | Refills: 1 | Status: SHIPPED | OUTPATIENT
Start: 2025-06-18

## 2025-06-18 RX ORDER — FUROSEMIDE 20 MG/1
20 TABLET ORAL DAILY
Status: DISCONTINUED | OUTPATIENT
Start: 2025-06-18 | End: 2025-06-18

## 2025-06-18 NOTE — PROGRESS NOTES
Progress Note  Carlos Howell Patient Status:  Inpatient    3/26/1940 MRN [id number]   Location [facility] 8NE-A Attending Abdullahi Sevreino MD   Hosp Day # 2 PCP Simba Nunez MD     Subjective:  He is resting comfortably in bed eating breakfast. Leg swelling has resolved. He denies chest pain or shortness of breath.    Objective:  /59 (BP Location: Left arm)   Pulse 63   Temp 98.3 °F (36.8 °C) (Oral)   Resp 18   Ht 5' (1.524 m)   Wt 118 lb (53.5 kg)   SpO2 96%   BMI 23.05 kg/m²     Intake/Output:    Intake/Output Summary (Last 24 hours) at 2025 0819  Last data filed at 2025 0800  Gross per 24 hour   Intake 490 ml   Output 1650 ml   Net -1160 ml       Last 3 Weights   25 0527 118 lb (53.5 kg)   25 0443 119 lb 12.8 oz (54.3 kg)   25 1613 125 lb 14.1 oz (57.1 kg)   25 1032 130 lb (59 kg)   25 0940 126 lb (57.2 kg)   24 1031 131 lb (59.4 kg)       Labs:  Recent Labs   Lab 25  1157 25  0606 25  0537   * 88 83   BUN 20 17 23   CREATSERUM 0.97 0.85 1.07   EGFRCR 77 86 68   CA 8.9 8.9 9.0   * 137 136   K 4.0 3.7 4.3  4.3    100 98   CO2 29.0 30.0 30.0     Recent Labs   Lab 25  1157 25  0606   RBC 4.29 4.05   HGB 12.1* 11.1*   HCT 37.5* 34.3*   MCV 87.4 84.7   MCH 28.2 27.4   MCHC 32.3 32.4   RDW 15.7 15.5   NEPRELIM 2.19 1.65   WBC 4.5 3.4*   .0 126.0*         Recent Labs   Lab 25  1157   TROPHS 17       Diagnostics:   Telemetry: NSR. While sleeping, two alarms with nonconductive PACs    TTE: 25   1. Left ventricle: The cavity size was normal. Wall thickness was normal.      Systolic function was at the lower limits of normal. The estimated      ejection fraction was 50-55%, by visual assessment. Hypokinesis of the      basal-midinferior wall. Left ventricular diastolic function parameters      were normal for the patient's age.   2. Right ventricle: The cavity size was  normal. Systolic function was      normal. Systolic pressure was mildly increased.   3. Ventricular septum: Septal motion was dyssynergic.   4. Left atrium: The atrium was mildly to moderately dilated.   5. Aortic valve: The valve was trileaflet. The leaflets were mildly      thickened and mildly calcified. Transvalvular velocity was within the      normal range. There was no evidence for stenosis. There was no      significant regurgitation.   6. Mitral valve: The annulus was mildly calcified. There was mild to      moderate regurgitation.   7. Tricuspid valve: There was mild-moderate regurgitation.   8. Pulmonary arteries: Systolic pressure was mildly increased, estimated to      be 41mm Hg. Estimated pulmonary artery diastolic pressure was 13mm Hg.     EKG 6/16 with NSR, first degree AVB, LBBB    Review of Systems   Cardiovascular:  Negative for chest pain.   Respiratory:  Negative for shortness of breath.        Physical Exam:  General: Alert and oriented in no apparent distress. Elderly.  HEENT: Pupils equal. Mucous membranes moist.   Neck: No JVD  Cardiac:  Normal S1 S2, Regular. No murmur  Lungs: Clear without wheezes or crackles    Abdomen: Soft, non-tender, ND  Extremities: No LE edema.   Neurologic: No focal deficits. Normal affect.  Skin: Warm and dry,    Medications:  Scheduled Medications[1]  Medication Infusions[2]    Assessment:    Acute on chronic HF improved LVEF, history of mildly reduced EF, ICM  Pro- on admission with mild hypervolemia  TTE 6/17 with LVEF 50-55%, hypokinesis basal-mid inferior wall (seen on prior echo), mild-mod MR, mild-mod TR, mild pulmonary HTN. Historically EF was mildly reduced 40-45% back in 2022.   GDMT: Coreg 3.125 mg BID. Relative hypotension limits ARB/ARNI/MRA at this time.  Appears euvolemic after IV diuresis down 3.6 L this admission. Transition to PO lasix 20 mg daily.  CAD s/p CABG x 4 - Denies angina. Inferior wall motion abnormality is not new,  family/patient has historically not wanted to pursue further ischemic evaluation. Medical management. BB, statin  Hypertension - Controlled  Hyperlipidemia- Crestor, LDL at goal 46  RA - Plaquenil   Possible ILD- Sees Dr. Ivan.   Hypoalbuminemia - alb 3.0, encourage protein intake in diet, contributes to third spacing in legs on presentation. LE edema has resolved.    Plan:    Appears euvolemic after IV diuresis. Transition to PO lasix 20 mg daily. Not previously on scheduled loop diuretic, recommend BMP in 7 days. Renal function stable  TTE with LVEF 50-55% low normal, improved LVEF compared to prior mildly reduced EF on echo 3 years ago.  Ok to discharge this afternoon from CV standpoint. Will arrange one week close office follow-up appointment.     Plan of care discussed with patient, RN.    FABIENNE Espinal  6/18/2025  8:19 AM    Patient seen and examined independently.  Note reviewed and labs reviewed. Agree with above assessment and plan.  85-year-old male who presented with acute on chronic heart failure with improved LVEF.  He was diuresed with IV Lasix and transition to p.o. Lasix 20 mg daily today.  Stable from a volume standpoint.  Okay to discharge from cardiology perspective with outpatient follow-up.  GDMT limited by hypotension.  Tolerating Coreg 3.125 mg twice daily for now.  As an outpatient, can consider transitioning to metoprolol with addition of alternate class if need be.        Elpidio Nunez DO  Cardiologist  [facility]  6/18/2025 12:19 PM      Note to the patient: The 21st Century Cures Act makes medical notes like these available to patients in the interest of transparency. However, be advised that this is a medical document. It is intended as peer to peer communication. It is written in medical language and may contain abbreviations or verbiage that are unfamiliar. It may appear blunt or direct. Medical documents are intended to carry relevant information, facts as  evident, and clinical opinion of the practitioner.     Disclaimer: Components of this note were documented using voice recognition system and are subject to errors not corrected at proofreading. Contact the author of this note for any clarifications.            [1]    magnesium oxide  400 mg Oral Once    ketorolac  15 mg Intravenous Q6H    enoxaparin  40 mg Subcutaneous Daily    furosemide  20 mg Intravenous BID (Diuretic)    rosuvastatin  5 mg Oral Nightly    hydroxychloroquine  200 mg Oral BID    fluticasone-salmeterol  1 puff Inhalation BID    carvedilol  3.125 mg Oral BID with meals   [2]

## 2025-06-18 NOTE — CARDIAC REHAB
Consult to Cardiac rehab received. Clinical notes reviewed. Not a candidate for phase 2 outpatient program at this time.

## 2025-06-18 NOTE — PLAN OF CARE
A&Ox4. [place]. O2 sat WNL on RA. 2L needed with sleep. SR on tele. Lovenox. Primofit in place. IV toradol given for RA pain- per pt, pain improving. Up x1 and walker. On specialty bed ordered by wound care. Wound care daily/PRN to sacrum. Pt turns self, encouraging to turn q2.       Problem: Patient/Family Goals  Goal: Patient/Family Long Term Goal  Description: Patient's Long Term Goal: Stay out of the hospital    Interventions:  - Take medications as ordered  - Attend follow up appointments  - See additional Care Plan goals for specific interventions  Outcome: Progressing  Goal: Patient/Family Short Term Goal  Description: Patient's Short Term Goal: Go home    Interventions:   - Labs, tele, consults  - Medications as ordered   - See additional Care Plan goals for specific interventions  Outcome: Progressing

## 2025-06-18 NOTE — HOME CARE LIAISON
Received referral via Aidin for Home Health services. Spoke w/ patient Daughter Mona and is agreeable for all home health care services

## 2025-06-18 NOTE — DIETARY MALNUTRITION NOTE
[facility]   part of [facility]    NUTRITION ASSESSMENT    Pt meets moderate malnutrition criteria at this time.    CRITERIA FOR MALNUTRITION DIAGNOSIS:  Criteria for non-severe malnutrition diagnosis: chronic illness related to wt loss 10% in 6 months and moderate wasting    NUTRITION INTERVENTION:    RD nutrition Care Plan- See RD nutrition assessment for additional recommendations  Meal and Snacks - Monitor and encourage adequate PO intake.   Medical Food Supplements - Ensure Plus High Protein BID. Rationale/use for oral supplements discussed.  Nutrition Education - Dietitian consult received per [facility] protocol for diet education. Provided and reviewed handouts. Dicussed tips to reduce sodium, fluid guidelines, foods to avoid, seasoning without salt and dining out. Pt receptive and expect compliance. All questions answered and contact provided. RD available prn. Pt/family receptive to education, no barriers noted. Handouts provided.     PATIENT STATUS: 06/18/25 Pt is a 85 year old male admitted with leg swelling. RD screened due to consult for CHF ed. Education completed. Pt found ot have wt loss -10% x 6 months. Pt with moderate muscle and fat wasting present. Pt agreeable to ONS. Pt reports appetite is pretty good. Had a good breakfast this am per report. Discussed low na diet and provided handout. +sacral wound, encouraged high protein. Follow for po, ons, wts    ANTHROPOMETRICS:  Ht: 152.4 cm (5')  Wt: 53.5 kg (118 lb).   BMI: Body mass index is 23.05 kg/m².  IBW: 46 kg    WEIGHT HISTORY:   Wt Readings from Last 10 Encounters:   06/18/25 53.5 kg (118 lb)   04/02/25 57.2 kg (126 lb)   12/27/24 59.4 kg (131 lb)   12/21/24 59 kg (130 lb)   10/02/24 60.3 kg (133 lb)   09/23/24 61.2 kg (135 lb)   09/05/24 61.2 kg (135 lb)   08/23/24 52.2 kg (115 lb)   07/26/24 67.1 kg (148 lb)   07/24/24 68 kg (150 lb)      NUTRITION:  Diet:       Procedures    Cardiac diet Sodium Restriction: 2 GM NA; Fluid Restriction:  2000 ml; Is Patient on Accuchecks? No      Food Allergies: No  Cultural/Ethnic/Faith Preferences Addressed: Yes    Percent Meals Eaten (last 3 days)       Date/Time Percent Meals Eaten (%)    06/16/25 1823 60 %    06/17/25 1658 75 %    06/18/25 0900 100 %          GI system review: WNL Last BM Date: 06/17/25  Skin and wounds: Sacral wound    NUTRITION RELATED PHYSICAL FINDINGS:     1. Body Fat/Muscle Mass: moderate depletion body fat Buccal fat pad, Triceps, and Midaxillary line and moderate muscle depletion Clavicle region and Shoulder/Acromion process     2. Fluid Accumulation: lower extremity edema none    NUTRITION PRESCRIPTION: 54 kg Actual Body Weight  Calories: 9498-5206 calories/day (30-35 kcal/kg)  Protein: 65-81 grams protein/day (1.2-1.5 grams protein per kg)  Fluid: ~1 ml/kcal or per MD discretion    NUTRITION DIAGNOSIS/PROBLEM:  Malnutrition related to physiological causes as evidenced by documented/reported unintentional weight loss, diagnosis consistent with elevated nutritional needs, loss of fat mass, and loss of muscle mass    MONITOR AND EVALUATE/NUTRITION GOALS:  PO intake of 75% of meals TID - New  PO intake of 75% of oral nutrition supplement/s - New  Weight stable within 1 to 2 lbs during admission - New    MEDICATIONS:  Reviewed    LABS:  Reviewed    Pt is at Moderate nutrition risk    Jennifer Bernabe RD, LDN  Clinical Dietitian

## 2025-06-18 NOTE — PROGRESS NOTES
NURSING DISCHARGE NOTE    Discharged Home via Wheelchair.  Accompanied by RN and Support staff  Belongings Taken by patient/family.    Patient discharged to home. Discharge instructions given to and understood by patient's son Isaias at the bedside. Daughter Mona and spouse Michelle notified of POC for discharge. Appointments reviewed. All questions answered. Patient left unit with all belongings and in no signs or symptoms of distress.

## 2025-06-18 NOTE — PLAN OF CARE
Patient alert and oriented x 4, can be forgetful at times. Up with x1 assist using walker. On RA. NSR with HB on tele. Continent of bowel and incontinent of bladder. No complaints of pain, shortness of breath, or chest pain/discomfort. POC discussed with patient. Fall precautions in place. Call light within reach.     Problem: Patient/Family Goals  Goal: Patient/Family Long Term Goal  Description: Patient's Long Term Goal: Stay out of the hospital    Interventions:  - Take medications as ordered  - Attend follow up appointments  - See additional Care Plan goals for specific interventions  Outcome: Progressing  Goal: Patient/Family Short Term Goal  Description: Patient's Short Term Goal: Go home    Interventions:   - Labs, tele, consults  - Medications as ordered   - See additional Care Plan goals for specific interventions  Outcome: Progressing

## 2025-06-18 NOTE — CM/SW NOTE
06/18/25 1000   CM/SW Referral Data   Referral Source Social Work (self-referral)   Reason for Referral Discharge planning   Informant HCPOA/Surrogate;Daughter   Medical Hx   Does patient have an established PCP? Yes   Patient Info   Patient's Home Environment House   Discharge Needs   Anticipated D/C needs Home health care   Choice of Post-Acute Provider   Informed patient of right to choose their preferred provider Yes   List of appropriate post-acute services provided to patient/family with quality data No - Declined list   Patient/family choice [facility]     SW spoke with pt's daughter, Mona, over the phone for discharge planning. Pt resides at home. Pt has history of [facility]. SW discussed with daughter of HHC at discharge. Daughter agreeable, agreeable to using [facility] again. SW explained that HH will help support wound care dressing changes. Reiterated that visits will be 3x per week. SW requested RN to make sure that pt discharges with a a day or two worth of dressing supplies for home health care to use. Referral sent in Aidin to [facility]. Orders entered.     Daughter stated that Isaias, pt's son in law, will be picking up pt this afternoon.     Wound care directions on AVS also.      &  to remain available and supportive for discharge planning needs.    Tyesha CATES, UNRULYW  Discharge Planner

## 2025-06-18 NOTE — PROGRESS NOTES
[facility]   part of [facility]     Hospitalist Progress Note     Carlos Howell Patient Status:  Inpatient    3/26/1940 MRN [id number]   Location [facility] 8NE-A Attending Abdullahi Severino MD   Hosp Day # 2 PCP Simba Nunez MD     Subjective:   Dong better- less pain and SOB improving     Objective:    Review of Systems:   A comprehensive review of systems was completed; pertinent positive and negatives stated in subjective.  Vital signs:  Temp:  [97.1 °F (36.2 °C)-99.2 °F (37.3 °C)] 97.1 °F (36.2 °C)  Pulse:  [] 66  Resp:  [18-22] 20  BP: ()/(45-65) 110/64  SpO2:  [92 %-95 %] 95 %  Physical Exam:    General: No acute distress    Respiratory: no wheezes, no rhonchi  Cardiovascular: S1, S2, RRR  Abdomen: Soft, NT/ND, +BS  Extremities: + edema, limited mobility 2/2 RA pain - better     Diagnostic Data:    Labs:  Recent Labs   Lab 25  1157 25  0606   WBC 4.5 3.4*   HGB 12.1* 11.1*   MCV 87.4 84.7   .0 126.0*   INR 1.22*  --      Recent Labs   Lab 25  1157 25  0606 25  0537   * 88 83   BUN 20 17 23   CREATSERUM 0.97 0.85 1.07   CA 8.9 8.9 9.0   ALB 3.3 3.0*  --    * 137 136   K 4.0 3.7 4.3  4.3    100 98   CO2 29.0 30.0 30.0   ALKPHO 136* 109  --    AST 52* 43*  --    ALT 31 25  --    BILT 0.5 0.6  --    TP 7.8 7.2  --      Estimated Creatinine Clearance: 36.3 mL/min (based on SCr of 1.07 mg/dL).  Recent Labs   Lab 25  1157   PTP 15.2*   INR 1.22*        Microbiology  No results found for this visit on 25.  Imaging: Reviewed in Epic.  Medications: Scheduled Medications[1]    Assessment & Plan:    #Acute on chronic systolic CHF with improved EF   -diuresed nicely  -I/Os, weights - losing >6lbs already   -ECHO with EF 50-55% Diastolic dysfunction ???  Moderate MR/TR   -Cardio following     #CAD s/p CABG- low suspicion for ACS  #Cirrhosis with elevated LFTs- trend improving as possible congestion from  above  #Pancytopenia suspect 2/2 cirrhosis- monitor   #Hyponatremia- improved with diuresis    #HTN- ran low last night - monitor on diuretics   #DL  #RA- in pain - add toradol for the next 24 hours to see if feels better, on plaquenil   #ILD  #Incidental baker's cyst- outpt f/u     DC planning          Supplementary Documentation:   Quality:  DVT Mechanical Prophylaxis:   SCDs,    DVT Pharmacologic Prophylaxis   Medication    enoxaparin (Lovenox) 40 MG/0.4ML SUBQ injection 40 mg                Code Status: Not on file  Melo: External urinary catheter in place  Melo Duration (in days):   Central line:    ROSA:   At this point Mr. Howell is expected to be discharge to: Home     The 21st Century Cures Act makes medical notes like these available to patients in the interest of transparency. Please be advised this is a medical document. Medical documents are intended to carry relevant information, facts as evident, and the clinical opinion of the practitioner. The medical note is intended as peer to peer communication and may appear blunt or direct. It is written in medical language and may contain abbreviations or verbiage that are unfamiliar.                [1]    ketorolac  15 mg Intravenous Q6H    enoxaparin  40 mg Subcutaneous Daily    furosemide  20 mg Intravenous BID (Diuretic)    rosuvastatin  5 mg Oral Nightly    hydroxychloroquine  200 mg Oral BID    fluticasone-salmeterol  1 puff Inhalation BID    carvedilol  3.125 mg Oral BID with meals

## 2025-06-19 ENCOUNTER — PATIENT OUTREACH (OUTPATIENT)
Dept: CASE MANAGEMENT | Age: 85
End: 2025-06-19

## 2025-06-19 ENCOUNTER — TELEPHONE (OUTPATIENT)
Dept: FAMILY MEDICINE CLINIC | Facility: CLINIC | Age: 85
End: 2025-06-19

## 2025-06-19 ENCOUNTER — PATIENT OUTREACH (OUTPATIENT)
Age: 85
End: 2025-06-19

## 2025-06-19 NOTE — PROGRESS NOTES
Hospital follow-up appt / Discharged 6/18 [facility]        TCC Request :  [facility]  [address] [place] [address] [phone]  Schedule an appointment as soon as possible for a visit        OR        PCP Request :  Simba Nunez MD  [id number]  [facility] [address]  [phone]          Attempt #1:  Left message on voicemail for patient to call transitions specialist back to schedule follow up appointments. Provided Transitions specialist scheduling phone number [phone].

## 2025-06-19 NOTE — TELEPHONE ENCOUNTER
Yes will sign HH orders.   Continue hospital treatment plan. Can we get home wound care to see him as well?

## 2025-06-19 NOTE — DISCHARGE SUMMARY
[place] HOSPITALIST  DISCHARGE SUMMARY     Carlos Howell Patient Status:  Inpatient    3/26/1940 MRN [id number]   Location [facility] 8NE-A Attending No att. providers found   Hosp Day # 2 PCP Simba Nunez MD     Date of Admission: 2025  Date of Discharge: 2025    Discharge Disposition: Home Health Care Services    Admitting Diagnosis:   Leg swelling [M79.89]  Hypervolemia, unspecified hypervolemia type [E87.70]    Hospital Discharge Diagnoses:  #Acute on chronic systolic CHF with improved EF   -diuresed nicely  -I/Os, weights - losing >6lbs already   -ECHO with EF 50-55% Diastolic dysfunction ???  Moderate MR/TR   -Cardio following      #CAD s/p CABG- low suspicion for ACS  #Cirrhosis with elevated LFTs- trend improving as possible congestion from above  #Pancytopenia suspect 2/2 cirrhosis- monitor   #Hyponatremia- improved with diuresis    #HTN- ran low last night - monitor on diuretics   #DL  #RA- in pain - add toradol for the next 24 hours to see if feels better, on plaquenil   #ILD  #Incidental baker's cyst- outpt f/u   #Left Buttock unstageable pressure Ulcer present on admission  #Moderate Malnutrition- appreciate dietary eval and tx plan     Lace+ Score: 75  59-90 High Risk  29-58 Medium Risk  0-28   Low Risk.     Brief Synopsis: Patient evaluated by cardiology and improved faster than expected with diuresis.  Patient also was given Toradol for his rheumatoid arthritis with significant improvements in pain and mobility.  Patient was weaned on the oxygen therapy to baseline and patient was cleared by cardiology service.  Patient discharged in stable condition.    Procedures during hospitalization:   None     Lab/Test results pending at Discharge:   None     Consultants:  Cardiology     Discharge Medication List:     Discharge Medications        START taking these medications        Instructions Prescription details   furosemide 20 MG Tabs  Commonly known as: Lasix      Take  1 tablet (20 mg total) by mouth daily.   Quantity: 30 tablet  Refills: 1            CONTINUE taking these medications        Instructions Prescription details   albuterol 108 (90 Base) MCG/ACT Aers  Commonly known as: Ventolin HFA      Inhale 2 puffs into the lungs every 6 (six) hours as needed for Wheezing.   Quantity: 3 each  Refills: 3     carvedilol 3.125 MG Tabs  Commonly known as: Coreg      Take 1 tablet (3.125 mg total) by mouth 2 (two) times daily.   Quantity: 180 tablet  Refills: 3     fluticasone furoate-vilanterol 100-25 MCG/ACT Aepb  Commonly known as: Breo Ellipta      Inhale 1 puff into the lungs daily.   Quantity: 3 each  Refills: 3     hydroxychloroquine 200 MG Tabs  Commonly known as: Plaquenil      Take 2 tablets (400 mg total) by mouth daily.   Quantity: 180 tablet  Refills: 3     rosuvastatin 5 MG Tabs  Commonly known as: Crestor      Take 1 tablet (5 mg total) by mouth nightly.   Quantity: 90 tablet  Refills: 3     Vitamin D 50 MCG (2000 UT) Tabs      Take 2,000 Units by mouth daily.   Quantity: 90 tablet  Refills: 3               Where to Get Your Medications        These medications were sent to [id number] - [facility] [address] [address] [address] [phone], [phone]  [address] [place] [address]      Phone: [phone]   furosemide 20 MG Tabs         Follow-up appointment:   Simba Nunez MD  [address]  [facility] [id number] [phone]    Schedule an appointment as soon as possible for a visit in 1 week(s)      LABS - BMP    Follow up in 7 day(s)  per cardiology    [facility]  [address] [address] [phone]  Schedule an appointment as soon as possible for a visit      Kiki Reyes  [address]  [place]  [facility] [address]  [phone]    Go on 6/27/2025  3 pm    an appointment has been made for you to see cardiology Kiki LOVING 6/27 at 3pm.       **do not remove this appt, contact HF   [id number]**      -----------------------------------------------------------------------------------------------  PATIENT DISCHARGE INSTRUCTIONS: See electronic chart    Abdullahi Severino MD 6/18/2025    Time spent: 33 minutes

## 2025-06-19 NOTE — PROGRESS NOTES
Transitional Care Management   Discharge Date: 25  Contact Date: 2025    Assessment:  TCM Initial Assessment    General:  Assessment completed with: Children  Patient Subjective: Pt doing well since discharge.  Chief Complaint: Leg swelling  Verify patient name and  with patient/ caregiver: Yes    Hospital Stay/Discharge:  Prior to leaving the hospital were your Discharge Instructions reviewed with you?: Yes  Did you receive a copy of your written Discharge Instructions?: Yes  Do you feel better or worse since you left the hospital or emergency department?: Better    Follow - Up Appointment:  Do you have a follow-up appointment?: No  Are there any barriers to getting to your follow-up appointment?: No    Home Health/DME:  Prior to leaving the hospital was Home Health (HH) arranged for you?: No     Prior to leaving the hospital or emergency department was Durable Medical Equipment (DME), medical supplies, or infusions arranged for you?: No  Are DME/medical supply/infusions needs identified by staff during this assessment?: No     Medications/Diet:       Were you given a different diet per your Discharge Instructions?: No     Questions/Concerns:  Do you have any questions or concerns that have not been discussed?: No         Follow-up Appointments:      [facility]  Was TCC Ordered: Yes  Was TCC Scheduled: No, Explain scheduled with PCP.    Primary Care Provider (If no TCC appointment)  Does patient already have a PCP appointment scheduled? No  Care Manager Scheduled PCP office TCM appointment with patient      Specialist  Does the patient have any other follow-up appointment(s) that need to be scheduled? Yes   -If yes: Care Manager reviewed upcoming specialist appointments with patient: Yes   -Does the patient need assistance scheduling appointment(s): No      Book By Date: 25

## 2025-06-19 NOTE — TELEPHONE ENCOUNTER
Triage call transferred.   Spoke with LILLY Cochran with [facility] f/u with start of care plan. Pt will need nursing, PT and OT.  RN inquiring if PCP will sign for HH orders?    Also, post hospital visit, pt seen for stage 2 ulcer on buttocks. Tx plan: saline wash, apply honey, bordered foam dressing, and to clean daily.   RN inquiring if should continue with hospital tx plan or does PCP want to add or change?  Informed will relay to PCP update and for further recommendations.  RN verbalized understanding and agreed with POC.  Call back# [phone]

## 2025-06-20 NOTE — TELEPHONE ENCOUNTER
Sammie, nurse with [facility] called. Advised her of provider reply. She entered consult for wound care. They are recommending keeping area clean with soap and water, pat dry and apply barrier cream or aquaphor. She will update treatment plan.

## 2025-06-20 NOTE — PROGRESS NOTES
Hospital follow-up appt / Discharged [facility]           T[facility] Request :  [facility]  [address] [place] [address] [place] [id number] [phone]  Schedule an appointment as soon as possible for a visit  Wednesday 6/25 @12:40pm w/ Simba Nunez (existing PCP appt)          Closing encounter

## 2025-06-25 ENCOUNTER — OFFICE VISIT (OUTPATIENT)
Dept: FAMILY MEDICINE CLINIC | Facility: CLINIC | Age: 85
End: 2025-06-25
Payer: MEDICARE

## 2025-06-25 ENCOUNTER — LAB ENCOUNTER (OUTPATIENT)
Dept: LAB | Age: 85
End: 2025-06-25
Attending: FAMILY MEDICINE
Payer: MEDICARE

## 2025-06-25 VITALS
OXYGEN SATURATION: 97 % | WEIGHT: 124 LBS | HEIGHT: 60 IN | SYSTOLIC BLOOD PRESSURE: 90 MMHG | BODY MASS INDEX: 24.35 KG/M2 | DIASTOLIC BLOOD PRESSURE: 58 MMHG | HEART RATE: 62 BPM | RESPIRATION RATE: 17 BRPM

## 2025-06-25 DIAGNOSIS — M06.9 RHEUMATOID ARTHRITIS INVOLVING MULTIPLE SITES, UNSPECIFIED WHETHER RHEUMATOID FACTOR PRESENT (HCC): ICD-10-CM

## 2025-06-25 DIAGNOSIS — E03.8 SUBCLINICAL HYPOTHYROIDISM: ICD-10-CM

## 2025-06-25 DIAGNOSIS — I50.23 ACUTE ON CHRONIC SYSTOLIC HEART FAILURE (HCC): Primary | ICD-10-CM

## 2025-06-25 DIAGNOSIS — I50.33 ACUTE ON CHRONIC HEART FAILURE WITH PRESERVED EJECTION FRACTION (HCC): ICD-10-CM

## 2025-06-25 LAB
ANION GAP SERPL CALC-SCNC: 8 MMOL/L (ref 0–18)
BUN BLD-MCNC: 21 MG/DL (ref 9–23)
CALCIUM BLD-MCNC: 8.8 MG/DL (ref 8.7–10.6)
CHLORIDE SERPL-SCNC: 99 MMOL/L (ref 98–112)
CO2 SERPL-SCNC: 29 MMOL/L (ref 21–32)
CREAT BLD-MCNC: 1.04 MG/DL (ref 0.7–1.3)
EGFRCR SERPLBLD CKD-EPI 2021: 71 ML/MIN/1.73M2 (ref 60–?)
GLUCOSE BLD-MCNC: 98 MG/DL (ref 70–99)
OSMOLALITY SERPL CALC.SUM OF ELEC: 285 MOSM/KG (ref 275–295)
POTASSIUM SERPL-SCNC: 4.2 MMOL/L (ref 3.5–5.1)
SODIUM SERPL-SCNC: 136 MMOL/L (ref 136–145)
T4 FREE SERPL-MCNC: 1 NG/DL (ref 0.8–1.7)
TSI SER-ACNC: 8.71 UIU/ML (ref 0.55–4.78)

## 2025-06-25 PROCEDURE — 84439 ASSAY OF FREE THYROXINE: CPT

## 2025-06-25 PROCEDURE — 84443 ASSAY THYROID STIM HORMONE: CPT

## 2025-06-25 PROCEDURE — 80048 BASIC METABOLIC PNL TOTAL CA: CPT

## 2025-06-25 PROCEDURE — 99214 OFFICE O/P EST MOD 30 MIN: CPT | Performed by: FAMILY MEDICINE

## 2025-06-25 PROCEDURE — 36415 COLL VENOUS BLD VENIPUNCTURE: CPT

## 2025-06-25 NOTE — PROGRESS NOTES
The following individual(s) verbally consented to be recorded using ambient AI listening technology and understand that they can each withdraw their consent to this listening technology at any point by asking the clinician to turn off or pause the recording:    Patient name: Carlos Howell

## 2025-06-30 ENCOUNTER — HOME HEALTH CHARGES (OUTPATIENT)
Dept: FAMILY MEDICINE CLINIC | Facility: CLINIC | Age: 85
End: 2025-06-30

## 2025-06-30 ENCOUNTER — MED REC SCAN ONLY (OUTPATIENT)
Dept: FAMILY MEDICINE CLINIC | Facility: CLINIC | Age: 85
End: 2025-06-30

## 2025-06-30 DIAGNOSIS — L89.322 STAGE II PRESSURE ULCER OF LEFT BUTTOCK (HCC): Primary | ICD-10-CM

## 2025-07-08 ENCOUNTER — MED REC SCAN ONLY (OUTPATIENT)
Dept: FAMILY MEDICINE CLINIC | Facility: CLINIC | Age: 85
End: 2025-07-08

## 2025-07-21 ENCOUNTER — TELEMEDICINE (OUTPATIENT)
Dept: FAMILY MEDICINE CLINIC | Facility: CLINIC | Age: 85
End: 2025-07-21
Payer: MEDICARE

## 2025-07-21 ENCOUNTER — NURSE TRIAGE (OUTPATIENT)
Dept: FAMILY MEDICINE CLINIC | Facility: CLINIC | Age: 85
End: 2025-07-21

## 2025-07-21 DIAGNOSIS — H10.31 ACUTE BACTERIAL CONJUNCTIVITIS OF RIGHT EYE: Primary | ICD-10-CM

## 2025-07-21 PROCEDURE — 99214 OFFICE O/P EST MOD 30 MIN: CPT | Performed by: FAMILY MEDICINE

## 2025-07-21 RX ORDER — POLYMYXIN B SULFATE AND TRIMETHOPRIM 1; 10000 MG/ML; [USP'U]/ML
1 SOLUTION OPHTHALMIC EVERY 4 HOURS
Qty: 10 ML | Refills: 0 | Status: SHIPPED | OUTPATIENT
Start: 2025-07-21

## 2025-07-21 NOTE — TELEPHONE ENCOUNTER
Action Requested: Summary for Provider     []  Critical Lab, Recommendations Needed  [x] Need Additional Advice  []   FYI    []   Need Orders  [] Need Medications Sent to Pharmacy  []  Other     SUMMARY: requesting to do a VV for patients eye, is it ok to do a vv? Are you able to accommodate patient for this today? Please advise     Reason for call: Irritation and Eye Problem  Onset: 1 week     Mona called stating patients eye has been irritating him since his procedure.for about 1 week now    Patient has been rubbing his eye and now there is pus/ discharge from his eye    Asking for a RX eye drop, advised an appointment would be needed, requesting a VV if possible and for today  Routing to provider for further recommendation         Reason for Disposition   Patient wants to be seen    Protocols used: Eye - Pus or [id number]

## 2025-07-21 NOTE — TELEPHONE ENCOUNTER
Spoke to Mona, states that patient had skin graft procedure done due to skin cancer about 3 weeks  in between nose that was close to right eye.    , it was a skin graft procedure in between nose close to right eye.

## 2025-07-21 NOTE — PROGRESS NOTES
Virtual Video Check-In    Carlos Howell verbally consents to a Virtual/Telephone Check-In visit on 07/21/25.  Patient has been referred to the [facility] website at [email or web address] to review the yearly Consent to Treat document.    Patient understands and accepts financial responsibility for any deductible, co-insurance and/or co-pays associated with this service.    Duration of the service: 10 minutes      Summary of topics discussed:     HPI:  Reports 1 week history of pruritus at surgical site on face (he had recent skin graft.) Due to this he has been rubbing his eye. He has noticed redness at right eye and started noticing purulent discharge at right eye. No vision loss. No blurry vision. Reports mild discomfort. No left eye symptoms. Have not used any OTC medications. Denies any fevers.    ROS: pertinent positives and negatives as per HPI     PE:   Gen: AAOx3  Head: NC/AT  Eyes: R eye conjunctival injection  Nose: No discharge visualized   Resp: breathing well on exam, normal effort. Able to speak full sentences without any SOB  Skin: no rashes, no lesions, no cyanosis  Psych: normal affect       Diagnoses and all orders for this visit:    Acute bacterial conjunctivitis of right eye  -     polymyxin B-trimethoprim 74617-9.1 UNIT/ML-% Ophthalmic Solution; Place 1 drop into the right eye every 4 (four) hours.    Suspect acute conjunctivitis. Will start antibiotics. Reviewed supportive care measures. Advised on warm compresses. Can use otc visine/pataday gtts if needed for itching, redness etc as well. F/u if no improvement or worsening of symptoms.     Simba Nunez MD

## 2025-07-24 NOTE — PROGRESS NOTES
Subjective:   Carlos Howell is a 85 year old male who presents for ER F/U (6/16 Viral Leg swelling x2 weeks. )     History/Other:   History of Present Illness  Carlos Howell is an 85 year old male with acute and chronic systolic CHF who presents for hospital follow-up. I reviewed hospital records, notes, workup etc. He is accompanied with his family.      He was hospitalized from June 16th through June 18th for acute and chronic systolic congestive heart failure (CHF), during which he underwent diuresis and had a cardiology consultation. Post-discharge, he has experienced some swelling in his legs, although it has reduced since receiving IV medications in the hospital. His weight initially decreased to 116 pounds but has since increased to 124 pounds. He is currently taking a diuretic (Lasix) every morning. He has not yet completed a follow-up metabolic panel to check his electrolytes.    He has a history of heart failure with an ejection fraction of 40% noted in a previous echocardiogram. A recent echocardiogram during his hospital stay showed an improvement to 50-55%, hypokinesis of basal midinferior wall. During hospitalization he had hyponatremia which improved with diuresis. Also found to have an incidental bilateral baker's cyst. He was weaned off oxygen therapy and discharged to home in stable condition.      Chief Complaint Reviewed and Verified  Nursing Notes Reviewed and   Verified  Tobacco Reviewed  Allergies Reviewed  Medications Reviewed    Problem List Reviewed  Medical History Reviewed  Surgical History   Reviewed  Family History Reviewed  Social History Reviewed         Tobacco:  He smoked tobacco in the past but quit greater than 12 months ago.  Tobacco Use[1]     Current Medications[2]    Review of Systems:  Pertinent items are noted in HPI.    Objective:   BP 90/58   Pulse 62   Resp 17   Ht 5' (1.524 m)   Wt 124 lb (56.2 kg)   SpO2 97%   BMI 24.22 kg/m²  Estimated  body mass index is 24.22 kg/m² as calculated from the following:    Height as of this encounter: 5' (1.524 m).    Weight as of this encounter: 124 lb (56.2 kg).  Physical Exam  GENERAL: Alert, cooperative, well developed, no acute distress  HEENT: Normocephalic, normal oropharynx, moist mucous membranes  CHEST: Clear to auscultation bilaterally, no wheezes, rhonchi, or crackles, no abnormal lung sounds  CARDIOVASCULAR: Normal heart rate and rhythm, S1 and S2 normal without murmurs  ABDOMEN: Soft, non-tender, non-distended, without organomegaly, normal bowel sounds  EXTREMITIES: Mild pitting edema b/l lower extremities 1+  NEUROLOGICAL: Cranial nerves grossly intact, moves all extremities without gross motor or sensory deficit      Assessment & Plan:   1. Acute on chronic systolic heart failure (HCC) (Primary)  -     [facility] HOME [facility]ALTH REFERRAL  2. Rheumatoid arthritis involving multiple sites, unspecified whether rheumatoid factor present ([facility])    Assessment & Plan  Acute and chronic systolic congestive heart failure (CHF)  Recent hospitalization for CHF with fluid retention post-discharge. Ejection fraction ~50%.   - Double Lasix to 40mg daily dosage until cardiologist appointment.  - Order metabolic panel today and recheck in a week.  - Follow up with cardiologist on Friday.    Rheumatoid arthritis with lung involvement  Rheumatoid arthritis with lung scarring. 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 Rituximab considered but requires vaccinations, which he is hesitant about. Discussing rituximab without vaccinations due to immunosuppression risks.  - Discuss rituximab without vaccinations with rheumatologist.  - Consider palliative care evaluation for pain management and quality of life improvement.    General Health Maintenance  Stable liver function, slightly elevated blood glucose, stable cholesterol, slightly elevated TSH with normal thyroid hormone levels. Kidney function  well-maintained.  - Monitor thyroid function and blood glucose levels.    Follow-up  Cardiologist and home health services visit scheduled. Rheumatology follow-up for RA management. Physical and occupational therapy evaluations planned.  - Attend cardiologist appointment on Friday.  - Coordinate with home health services for evaluation and potential physical and occupational therapy.  - Follow up with rheumatologist for pain management discussion.        Return in about 3 months (around 2025).        Simba Nunez MD, 2025, 2:41 PM             [1]   Social History  Tobacco Use   Smoking Status Former    Current packs/day: 0.00    Average packs/day: 1 pack/day for 50.0 years (50.0 ttl pk-yrs)    Types: Cigarettes    Start date: 1956    Quit date: 3/26/2005    Years since quittin.3    Passive exposure: Past   Smokeless Tobacco Never   Tobacco Comments    quit smoking about 6 yrs ago   [2]   Current Outpatient Medications   Medication Sig Dispense Refill    furosemide 20 MG Oral Tab Take 1 tablet (20 mg total) by mouth daily. 30 tablet 1    carvedilol 3.125 MG Oral Tab Take 1 tablet (3.125 mg total) by mouth 2 (two) times daily. 180 tablet 3    Cholecalciferol (VITAMIN D) 50 MCG (2000 UT) Oral Tab Take 2,000 Units by mouth daily. 90 tablet 3    hydroxychloroquine 200 MG Oral Tab Take 2 tablets (400 mg total) by mouth daily. (Patient taking differently: Take 1 tablet (200 mg total) by mouth 2 (two) times daily.) 180 tablet 3    rosuvastatin 5 MG Oral Tab Take 1 tablet (5 mg total) by mouth nightly. 90 tablet 3    fluticasone furoate-vilanterol (BREO ELLIPTA) 100-25 MCG/ACT Inhalation Aerosol Powder, Breath Activated Inhale 1 puff into the lungs daily. 3 each 3    albuterol 108 (90 Base) MCG/ACT Inhalation Aero Soln Inhale 2 puffs into the lungs every 6 (six) hours as needed for Wheezing. 3 each 3    polymyxin B-trimethoprim 41133-3.1 UNIT/ML-% Ophthalmic Solution Place 1 drop into the right  eye every 4 (four) hours. 10 mL 0

## 2025-07-25 ENCOUNTER — TELEPHONE (OUTPATIENT)
Dept: FAMILY MEDICINE CLINIC | Facility: CLINIC | Age: 85
End: 2025-07-25

## 2025-08-07 ENCOUNTER — TELEPHONE (OUTPATIENT)
Dept: FAMILY MEDICINE CLINIC | Facility: CLINIC | Age: 85
End: 2025-08-07

## 2025-08-08 ENCOUNTER — LAB ENCOUNTER (OUTPATIENT)
Dept: LAB | Age: 85
End: 2025-08-08
Attending: PHYSICIAN ASSISTANT

## 2025-08-08 DIAGNOSIS — R60.0 LOCALIZED EDEMA: ICD-10-CM

## 2025-08-08 DIAGNOSIS — Z95.1 POSTSURGICAL AORTOCORONARY BYPASS STATUS: ICD-10-CM

## 2025-08-08 DIAGNOSIS — I25.10 CORONARY ATHEROSCLEROSIS OF NATIVE CORONARY ARTERY: Primary | ICD-10-CM

## 2025-08-08 DIAGNOSIS — E78.00 PURE HYPERCHOLESTEROLEMIA: ICD-10-CM

## 2025-08-08 LAB
ANION GAP SERPL CALC-SCNC: 6 MMOL/L (ref 0–18)
BUN BLD-MCNC: 17 MG/DL (ref 9–23)
CALCIUM BLD-MCNC: 8.9 MG/DL (ref 8.7–10.6)
CHLORIDE SERPL-SCNC: 99 MMOL/L (ref 98–112)
CO2 SERPL-SCNC: 31 MMOL/L (ref 21–32)
CREAT BLD-MCNC: 0.98 MG/DL (ref 0.7–1.3)
EGFRCR SERPLBLD CKD-EPI 2021: 76 ML/MIN/1.73M2 (ref 60–?)
FASTING STATUS PATIENT QL REPORTED: NO
GLUCOSE BLD-MCNC: 109 MG/DL (ref 70–99)
NT-PROBNP SERPL-MCNC: 430 PG/ML (ref ?–450)
OSMOLALITY SERPL CALC.SUM OF ELEC: 284 MOSM/KG (ref 275–295)
POTASSIUM SERPL-SCNC: 4 MMOL/L (ref 3.5–5.1)
SODIUM SERPL-SCNC: 136 MMOL/L (ref 136–145)

## 2025-08-08 PROCEDURE — 80048 BASIC METABOLIC PNL TOTAL CA: CPT

## 2025-08-08 PROCEDURE — 83880 ASSAY OF NATRIURETIC PEPTIDE: CPT

## 2025-08-08 PROCEDURE — 36415 COLL VENOUS BLD VENIPUNCTURE: CPT

## 2025-08-15 ENCOUNTER — MED REC SCAN ONLY (OUTPATIENT)
Dept: FAMILY MEDICINE CLINIC | Facility: CLINIC | Age: 85
End: 2025-08-15

## 2025-08-28 ENCOUNTER — APPOINTMENT (OUTPATIENT)
Dept: GENERAL RADIOLOGY | Age: 85
End: 2025-08-28
Attending: EMERGENCY MEDICINE

## 2025-08-28 ENCOUNTER — APPOINTMENT (OUTPATIENT)
Dept: CT IMAGING | Age: 85
End: 2025-08-28
Attending: EMERGENCY MEDICINE

## 2025-08-28 ENCOUNTER — TELEPHONE (OUTPATIENT)
Dept: FAMILY MEDICINE CLINIC | Facility: CLINIC | Age: 85
End: 2025-08-28

## 2025-08-28 ENCOUNTER — HOSPITAL ENCOUNTER (EMERGENCY)
Age: 85
Discharge: HOME | End: 2025-08-28
Attending: EMERGENCY MEDICINE

## 2025-08-28 VITALS
TEMPERATURE: 99 F | WEIGHT: 130 LBS | OXYGEN SATURATION: 98 % | HEIGHT: 61 IN | HEART RATE: 84 BPM | RESPIRATION RATE: 18 BRPM | BODY MASS INDEX: 24.55 KG/M2 | DIASTOLIC BLOOD PRESSURE: 62 MMHG | SYSTOLIC BLOOD PRESSURE: 108 MMHG

## 2025-08-28 DIAGNOSIS — N30.90 BLADDER INFECTION: ICD-10-CM

## 2025-08-28 DIAGNOSIS — E86.0 DEHYDRATION: Primary | ICD-10-CM

## 2025-08-28 LAB
ALBUMIN SERPL-MCNC: 3.2 G/DL (ref 3.2–4.8)
ALBUMIN/GLOB SERPL: 0.7 (ref 1–2)
ALP LIVER SERPL-CCNC: 104 U/L (ref 45–117)
ALT SERPL-CCNC: 25 U/L (ref 10–49)
ANION GAP SERPL CALC-SCNC: 8 MMOL/L (ref 0–18)
AST SERPL-CCNC: 42 U/L (ref ?–34)
ATRIAL RATE: 81 BPM
BASOPHILS # BLD AUTO: 0.03 X10(3) UL (ref 0–0.2)
BASOPHILS NFR BLD AUTO: 0.4 %
BILIRUB SERPL-MCNC: 0.9 MG/DL (ref 0.2–1.1)
BILIRUB UR QL STRIP.AUTO: NEGATIVE
BUN BLD-MCNC: 33 MG/DL (ref 9–23)
CALCIUM BLD-MCNC: 8.8 MG/DL (ref 8.7–10.6)
CHLORIDE SERPL-SCNC: 95 MMOL/L (ref 98–112)
CLARITY UR REFRACT.AUTO: CLEAR
CO2 SERPL-SCNC: 28 MMOL/L (ref 21–32)
COLOR UR AUTO: YELLOW
CREAT BLD-MCNC: 1.19 MG/DL (ref 0.7–1.3)
EGFRCR SERPLBLD CKD-EPI 2021: 60 ML/MIN/1.73M2 (ref 60–?)
EOSINOPHIL # BLD AUTO: 0 X10(3) UL (ref 0–0.7)
EOSINOPHIL NFR BLD AUTO: 0 %
ERYTHROCYTE [DISTWIDTH] IN BLOOD BY AUTOMATED COUNT: 14.9 %
GLOBULIN PLAS-MCNC: 4.4 G/DL (ref 2–3.5)
GLUCOSE BLD-MCNC: 120 MG/DL (ref 70–99)
GLUCOSE UR STRIP.AUTO-MCNC: NEGATIVE MG/DL
HCT VFR BLD AUTO: 35.7 % (ref 39–53)
HGB BLD-MCNC: 11.8 G/DL (ref 13–17.5)
HYALINE CASTS #/AREA URNS AUTO: PRESENT /LPF
IMM GRANULOCYTES # BLD AUTO: 0.03 X10(3) UL (ref 0–1)
IMM GRANULOCYTES NFR BLD: 0.4 %
KETONES UR STRIP.AUTO-MCNC: NEGATIVE MG/DL
LYMPHOCYTES # BLD AUTO: 0.61 X10(3) UL (ref 1–4)
LYMPHOCYTES NFR BLD AUTO: 7.4 %
MCH RBC QN AUTO: 28.2 PG (ref 26–34)
MCHC RBC AUTO-ENTMCNC: 33.1 G/DL (ref 31–37)
MCV RBC AUTO: 85.4 FL (ref 80–100)
MONOCYTES # BLD AUTO: 0.88 X10(3) UL (ref 0.1–1)
MONOCYTES NFR BLD AUTO: 10.7 %
NEUTROPHILS # BLD AUTO: 6.65 X10 (3) UL (ref 1.5–7.7)
NEUTROPHILS # BLD AUTO: 6.65 X10(3) UL (ref 1.5–7.7)
NEUTROPHILS NFR BLD AUTO: 81.1 %
NITRITE UR QL STRIP.AUTO: NEGATIVE
OSMOLALITY SERPL CALC.SUM OF ELEC: 280 MOSM/KG (ref 275–295)
P AXIS: 55 DEGREES
P-R INTERVAL: 276 MS
PH UR STRIP.AUTO: 6 (ref 5–8)
PLATELET # BLD AUTO: 76 10(3)UL (ref 150–450)
PLATELETS.RETICULATED NFR BLD AUTO: 5.3 % (ref 0–7)
POCT INFLUENZA A: NEGATIVE
POCT INFLUENZA B: NEGATIVE
POTASSIUM SERPL-SCNC: 3.8 MMOL/L (ref 3.5–5.1)
PROT SERPL-MCNC: 7.6 G/DL (ref 5.7–8.2)
PROT UR STRIP.AUTO-MCNC: NEGATIVE MG/DL
Q-T INTERVAL: 416 MS
QRS DURATION: 138 MS
QTC CALCULATION (BEZET): 483 MS
R AXIS: -34 DEGREES
RBC # BLD AUTO: 4.18 X10(6)UL (ref 3.8–5.8)
SARS-COV-2 RNA RESP QL NAA+PROBE: NOT DETECTED
SODIUM SERPL-SCNC: 131 MMOL/L (ref 136–145)
SP GR UR STRIP.AUTO: 1.02 (ref 1–1.03)
T AXIS: 119 DEGREES
UROBILINOGEN UR STRIP.AUTO-MCNC: 0.2 MG/DL
VENTRICULAR RATE: 81 BPM
WBC # BLD AUTO: 8.2 X10(3) UL (ref 4–11)

## 2025-08-28 PROCEDURE — 96365 THER/PROPH/DIAG IV INF INIT: CPT

## 2025-08-28 PROCEDURE — 87186 SC STD MICRODIL/AGAR DIL: CPT | Performed by: EMERGENCY MEDICINE

## 2025-08-28 PROCEDURE — 70450 CT HEAD/BRAIN W/O DYE: CPT | Performed by: EMERGENCY MEDICINE

## 2025-08-28 PROCEDURE — 80053 COMPREHEN METABOLIC PANEL: CPT | Performed by: EMERGENCY MEDICINE

## 2025-08-28 PROCEDURE — 87086 URINE CULTURE/COLONY COUNT: CPT | Performed by: EMERGENCY MEDICINE

## 2025-08-28 PROCEDURE — 81001 URINALYSIS AUTO W/SCOPE: CPT | Performed by: EMERGENCY MEDICINE

## 2025-08-28 PROCEDURE — 71045 X-RAY EXAM CHEST 1 VIEW: CPT | Performed by: EMERGENCY MEDICINE

## 2025-08-28 PROCEDURE — 96361 HYDRATE IV INFUSION ADD-ON: CPT

## 2025-08-28 PROCEDURE — 99285 EMERGENCY DEPT VISIT HI MDM: CPT

## 2025-08-28 PROCEDURE — 85025 COMPLETE CBC W/AUTO DIFF WBC: CPT | Performed by: EMERGENCY MEDICINE

## 2025-08-28 PROCEDURE — 87077 CULTURE AEROBIC IDENTIFY: CPT | Performed by: EMERGENCY MEDICINE

## 2025-08-28 PROCEDURE — 93005 ELECTROCARDIOGRAM TRACING: CPT

## 2025-08-28 PROCEDURE — 93010 ELECTROCARDIOGRAM REPORT: CPT

## 2025-08-28 PROCEDURE — 87502 INFLUENZA DNA AMP PROBE: CPT | Performed by: EMERGENCY MEDICINE

## 2025-08-28 PROCEDURE — 81015 MICROSCOPIC EXAM OF URINE: CPT | Performed by: EMERGENCY MEDICINE

## 2025-08-28 RX ORDER — CEPHALEXIN 500 MG/1
500 CAPSULE ORAL 2 TIMES DAILY
Qty: 14 CAPSULE | Refills: 0 | Status: ON HOLD | OUTPATIENT
Start: 2025-08-28 | End: 2025-09-01

## 2025-08-29 ENCOUNTER — TELEPHONE (OUTPATIENT)
Dept: FAMILY MEDICINE CLINIC | Facility: CLINIC | Age: 85
End: 2025-08-29

## 2025-08-29 PROBLEM — E87.1 HYPONATREMIA: Status: ACTIVE | Noted: 2025-08-29

## 2025-08-29 PROBLEM — D69.6 THROMBOCYTOPENIA: Status: ACTIVE | Noted: 2025-08-29

## 2025-08-29 PROBLEM — J18.9 COMMUNITY ACQUIRED PNEUMONIA, UNSPECIFIED LATERALITY: Status: ACTIVE | Noted: 2025-08-29

## 2025-08-29 PROBLEM — R29.6 FREQUENT FALLS: Status: ACTIVE | Noted: 2025-08-29

## (undated) DIAGNOSIS — I25.10 CORONARY ARTERY DISEASE INVOLVING NATIVE HEART, UNSPECIFIED VESSEL OR LESION TYPE, UNSPECIFIED WHETHER ANGINA PRESENT: Primary | ICD-10-CM

## (undated) DIAGNOSIS — E78.2 MIXED HYPERLIPIDEMIA: ICD-10-CM

## (undated) DEVICE — BITEBLOCK ENDOSCP 60FR MAXI STRP

## (undated) DEVICE — 10FT COMBINED O2 DELIVERY/CO2 MONITORING. FILTER WITH MICROSTREAM TYPE LUER: Brand: DUAL ADULT NASAL CANNULA

## (undated) DEVICE — KIT VLV 5 PC AIR H2O SUCT BX ENDOGATOR CONN

## (undated) DEVICE — KIT CUSTOM ENDOPROCEDURE STERIS

## (undated) DEVICE — SIX SHOOTER SAEED MULTI-BAND LIGATOR: Brand: SAEED

## (undated) DEVICE — 1200CC GUARDIAN II: Brand: GUARDIAN

## (undated) DEVICE — GIJAW SINGLE-USE BIOPSY FORCEPS WITH NEEDLE: Brand: GIJAW

## (undated) DEVICE — 3M™ RED DOT™ MONITORING ELECTRODE WITH FOAM TAPE AND STICKY GEL, 50/BAG, 20/CASE, 72/PLT 2570: Brand: RED DOT™

## (undated) DEVICE — KIT MFLD FOR SPEC COLL

## (undated) NOTE — Clinical Note
Mu Chavez, Enrique, pt feeling better since discharge.  Has scheduled visit with you.  Thank you, Leanne

## (undated) NOTE — LETTER
[address]  [address]  Authorization for Surgical Operation and Procedure     Date:___________                                                                                                         Time:__________  I hereby authorize Surgeon(s):  Lion Aguilera MD, my physician and his/her assistants (if applicable), which may include medical students, residents, and/or fellows, to perform the following surgical operation/ procedure and administer such anesthesia as may be determined necessary by my physician:  Operation/Procedure name Esophagogastroduodenoscopy, possible biopsy, possible control of bleed under Monitored Anesthesia care on Carlos Howell   2.   I recognize that during the surgical operation/procedure, unforeseen conditions may necessitate additional or different procedures than those listed above.  I, therefore, further authorize and request that the above-named surgeon, assistants, or designees perform such procedures as are, in their judgment, necessary and desirable.    3.   My surgeon/physician has discussed prior to my surgery the potential benefits, risks and side effects of this procedure; the likelihood of achieving goals; and potential problems that might occur during recuperation.  They also discussed reasonable alternatives to the procedure, including risks, benefits, and side effects related to the alternatives and risks related to not receiving this procedure.  I have had all my questions answered and I acknowledge that no guarantee has been made as to the result that may be obtained.    4.   Should the need arise during my operation/procedure, which includes change of level of care prior to discharge, I also consent to the administration of blood and/or blood products.  Further, I understand that despite careful testing and screening of blood or blood products by collecting agencies, I may still be subject to ill effects as a result of  receiving a blood transfusion and/or blood products.  The following are some, but not all, of the potential risks that can occur: fever and allergic reactions, hemolytic reactions, transmission of diseases such as Hepatitis, AIDS and Cytomegalovirus (CMV) and fluid overload.  In the event that I wish to have an autologous transfusion of my own blood, or a directed donor transfusion, I will discuss this with my physician.  Check only if Refusing Blood or Blood Products  I understand refusal of blood or blood products as deemed necessary by my physician may have serious consequences to my condition to include possible death. I hereby assume responsibility for my refusal and release the hospital, its personnel, and my physicians from any responsibility for the consequences of my refusal.          o  Refuse      5.   I authorize the use of any specimen, organs, tissues, body parts or foreign objects that may be removed from my body during the operation/procedure for diagnosis, research or teaching purposes and their subsequent disposal by hospital authorities.  I also authorize the release of specimen test results and/or written reports to my treating physician on the hospital medical staff or other referring or consulting physicians involved in my care, at the discretion of the Pathologist or my treating physician.    6.   I consent to the photographing or videotaping of the operations or procedures to be performed, including appropriate portions of my body for medical, scientific, or educational purposes, provided my identity is not revealed by the pictures or by descriptive texts accompanying them.  If the procedure has been photographed/videotaped, the surgeon will obtain the original picture, image, videotape or CD.  The hospital will not be responsible for storage, release or maintenance of the picture, image, tape or CD.    7.   I consent to the presence of a  or observers in the operating room  as deemed necessary by my physician or their designees.    8.   I recognize that in the event my procedure results in extended X-Ray/fluoroscopy time, I may develop a skin reaction.    9. If I have a Do Not Attempt Resuscitation (DNAR) order in place, that status will be suspended while in the operating room, procedural suite, and during the recovery period unless otherwise explicitly stated by me (or a person authorized to consent on my behalf). The surgeon or my attending physician will determine when the applicable recovery period ends for purposes of reinstating the DNAR order.  10. Patients having a sterilization procedure: I understand that if the procedure is successful the results will be permanent and it will therefore be impossible for me to inseminate, conceive, or bear children.  I also understand that the procedure is intended to result in sterility, although the result has not been guaranteed.   11. I acknowledge that my physician has explained sedation/analgesia administration to me including the risk and benefits I consent to the administration of sedation/analgesia as may be necessary or desirable in the judgment of my physician.    I CERTIFY THAT I HAVE READ AND FULLY UNDERSTAND THE ABOVE CONSENT TO OPERATION and/or OTHER PROCEDURE.    _________________________________________  __________________________________  Signature of Patient     Signature of Responsible Person         ___________________________________         Printed Name of Responsible Person           _________________________________                 Relationship to Patient  _________________________________________  ______________________________  Signature of Witness          Date  Time      Patient Name: Carlos MCDERMOTT Dante     : 3/26/1940                 Printed: 2024     Medical Record #: [id number]                     Page 1 of [address] [place] [address],  [place]  [address]    Consent for Anesthesia    ICarlos agree to be cared for by an anesthesiologist, who is specially trained to monitor me and give me medicine to put me to sleep or keep me comfortable during my procedure    I understand that my anesthesiologist is not an employee or agent of [facility] or [employer]. He or she works for [employer].    As the patient asking for anesthesia services, I agree to:  Allow the anesthesiologist (anesthesia doctor) to give me medicine and do additional procedures as necessary. Some examples are: Starting or using an “IV” to give me medicine, fluids or blood during my procedure, and having a breathing tube placed to help me breathe when I’m asleep (intubation). In the event that my heart stops working properly, I understand that my anesthesiologist will make every effort to sustain my life, unless otherwise directed by [facility] Do Not Resuscitate documents.  Tell my anesthesia doctor before my procedure:  If I am pregnant.  The last time that I ate or drank.  All of the medicines I take (including prescriptions, herbal supplements, and pills I can buy without a prescription (including street drugs/illegal medications). Failure to inform my anesthesiologist about these medicines may increase my risk of anesthetic complications.  If I am allergic to anything or have had a reaction to anesthesia before.  I understand how the anesthesia medicine will help me (benefits).  I understand that with any type of anesthesia medicine there are risks:  The most common risks are: nausea, vomiting, sore throat, muscle soreness, damage to my eyes, mouth, or teeth (from breathing tube placement).  Rare risks include: remembering what happened during my procedure, allergic reactions to medications, injury to my airway, heart, lungs, vision, nerves, or muscles and in extremely rare instances death.  My doctor has explained to me other  choices available to me for my care (alternatives).  Pregnant Patients (“epidural”):  I understand that the risks of having an epidural (medicine given into my back to help control pain during labor), include itching, low blood pressure, difficulty urinating, headache or slowing of the baby’s heart. Very rare risks include infection, bleeding, seizure, irregular heart rhythms and nerve injury.  Regional Anesthesia (“spinal”, “epidural”, & “nerve blocks”):  I understand that rare but potential complications include headache, bleeding, infection, seizure, irregular heart rhythms, and nerve injury.    I can change my mind about having anesthesia services at any time before I get the medicine.    _____________________________________________________________________________  Patient (or Representative) Signature/Relationship to Patient  Date   Time    _____________________________________________________________________________   Name (if used)    Language/Organization   Time    _____________________________________________________________________________  Anesthesiologist Signature     Date   Time  I have discussed the procedure and information above with the patient (or patient’s representative) and answered their questions. The patient or their representative has agreed to have anesthesia services.    _____________________________________________________________________________  Witness        Date   Time  I have verified that the signature is that of the patient or patient’s representative, and that it was signed before the procedure  Patient Name: Carlos Diasadeola     : 3/26/1940                 Printed: 2024     Medical Record #: [id number]                     Page 2 of 2

## (undated) NOTE — LETTER
Your patient was recently seen at the [facility] for a hospital follow-up visit. The visit note is attached. Please contact the clinic with any questions at [phone].     Thank you,  NEELIMA Leigh

## (undated) NOTE — LETTER
[address]ica [address]ails [address]  [address] [facility] [address] [facility]  Authorization for Surgical Operation and Procedure     Date:___________                                                                                                         Time:__________ 4.   Should the need arise during my operation or immediate post-operative period, I also consent to the administration of blood and/or blood products.   Further, I understand that despite careful testing and screening of blood or blood products by rut 8.   I recognize that in the event my procedure results in extended X-Ray/fluoroscopy time, I may develop a skin reaction. 9.  If I have a Do Not Attempt Resuscitation (DNAR) order in place, that status will be suspended while in the operating room, proc 1. ISushila agree to be cared for by an anesthesiologist, who is specially trained to monitor me and give me medicine to put me to sleep or keep me comfortable during my procedure    I understand that my anesthesiologist is not an employe 5. My doctor has explained to me other choices available to me for my care (alternatives).   6. Pregnant Patients (“epidural”):  I understand that the risks of having an epidural (medicine given into my back to help control pain during labor), include itchi

## (undated) NOTE — LETTER
Jessica Nails [address]  [address] [facility] [address], [address] [facility]  Authorization for Surgical Operation and Procedure     Date:___________                                                                                                         Time:__________ 4.   Should the need arise during my operation or immediate post-operative period, I also consent to the administration of blood and/or blood products.   Further, I understand that despite careful testing and screening of blood or blood products by rut 8.   I recognize that in the event my procedure results in extended X-Ray/fluoroscopy time, I may develop a skin reaction. 9.  If I have a Do Not Attempt Resuscitation (DNAR) order in place, that status will be suspended while in the operating room, proc 1. IZahraa agree to be cared for by an anesthesiologist, who is specially trained to monitor me and give me medicine to put me to sleep or keep me comfortable during my procedure    I understand that my anesthesiologist is not an employe 5. My doctor has explained to me other choices available to me for my care (alternatives).   6. Pregnant Patients (“epidural”):  I understand that the risks of having an epidural (medicine given into my back to help control pain during labor), include itchi

## (undated) NOTE — LETTER
[address] [facility] FOR BLOOD OR BLOOD COMPONENTS      In the course of your treatment, it may become necessary to administer a transfusion of blood or blood components.  This form provides basic information concerning this proc If loss of blood poses serious threats in the course of your treatment, THERE IS NO EFFECTIVE ALTERNATIVE TO BLOOD TRANSFUSION.  However, if you have any further questions on this matter, your physician will fully explain the alternatives to you if it has n

## (undated) NOTE — Clinical Note
ELAN, In office TCM on 2/19/2021. TCM template completed. Daughter will want to discuss obtaining a wheelchair for travel.

## (undated) NOTE — LETTER
[facility] [place]ONENTS      In the course of your treatment, it may become necessary to administer a transfusion of blood or blood components.  This form provides basic information concerning this proc If loss of blood poses serious threats in the course of your treatment, THERE IS NO EFFECTIVE ALTERNATIVE TO BLOOD TRANSFUSION.  However, if you have any further questions on this matter, your physician will fully explain the alternatives to you if it has n In giving consent, I acknowledge that my physician has also informed me that despite careful screening and testing in accordance with national and regional regulations, there is still a small risk of transmission of infectious agents including hepatitis, H [  ] I understand refusal of blood or blood products as deemed necessary by my physician may have serious consequences to my condition to include possible death.  I hereby assume responsibility for my refusal and release the hospital, its personnel, and my